# Patient Record
Sex: MALE | Race: WHITE | NOT HISPANIC OR LATINO | Employment: UNEMPLOYED | URBAN - METROPOLITAN AREA
[De-identification: names, ages, dates, MRNs, and addresses within clinical notes are randomized per-mention and may not be internally consistent; named-entity substitution may affect disease eponyms.]

---

## 2019-06-21 ENCOUNTER — HOSPITAL ENCOUNTER (EMERGENCY)
Facility: HOSPITAL | Age: 49
Discharge: HOME | End: 2019-06-21
Attending: EMERGENCY MEDICINE | Admitting: EMERGENCY MEDICINE

## 2019-06-21 ENCOUNTER — APPOINTMENT (OUTPATIENT)
Dept: CT IMAGING | Facility: HOSPITAL | Age: 49
End: 2019-06-21

## 2019-06-21 ENCOUNTER — APPOINTMENT (OUTPATIENT)
Dept: GENERAL RADIOLOGY | Facility: HOSPITAL | Age: 49
End: 2019-06-21

## 2019-06-21 VITALS
DIASTOLIC BLOOD PRESSURE: 74 MMHG | OXYGEN SATURATION: 100 % | TEMPERATURE: 98 F | BODY MASS INDEX: 33.6 KG/M2 | SYSTOLIC BLOOD PRESSURE: 112 MMHG | WEIGHT: 240 LBS | HEART RATE: 80 BPM | HEIGHT: 71 IN | RESPIRATION RATE: 16 BRPM

## 2019-06-21 DIAGNOSIS — G40.309 NONINTRACTABLE GENERALIZED IDIOPATHIC EPILEPSY WITHOUT STATUS EPILEPTICUS (HCC): Primary | ICD-10-CM

## 2019-06-21 DIAGNOSIS — F10.220: ICD-10-CM

## 2019-06-21 LAB
ANION GAP SERPL CALCULATED.3IONS-SCNC: 11 MMOL/L (ref 10–20)
BASOPHILS # BLD AUTO: 0 10*3/MM3 (ref 0–0.2)
BASOPHILS NFR BLD AUTO: 0.6 % (ref 0–1.5)
BUN BLD-MCNC: 12 MG/DL (ref 8–20)
BUN/CREAT SERPL: 15 (ref 6.2–20.3)
CALCIUM SPEC-SCNC: 8 MG/DL (ref 8.9–10.3)
CHLORIDE SERPL-SCNC: 100 MMOL/L (ref 101–111)
CO2 SERPL-SCNC: 22 MMOL/L (ref 22–32)
CREAT BLD-MCNC: 0.8 MG/DL (ref 0.7–1.2)
DEPRECATED RDW RBC AUTO: 41.1 FL (ref 37–54)
EOSINOPHIL # BLD AUTO: 0.2 10*3/MM3 (ref 0–0.4)
EOSINOPHIL NFR BLD AUTO: 3.5 % (ref 0.3–6.2)
ERYTHROCYTE [DISTWIDTH] IN BLOOD BY AUTOMATED COUNT: 13.1 % (ref 12.3–15.4)
ETHANOL UR QL: 0.14 %
GFR SERPL CREATININE-BSD FRML MDRD: 103 ML/MIN/1.73
GLUCOSE BLD-MCNC: 95 MG/DL (ref 65–99)
HCT VFR BLD AUTO: 39 % (ref 37.5–51)
HGB BLD-MCNC: 13.5 G/DL (ref 13–17.7)
LYMPHOCYTES # BLD AUTO: 1.4 10*3/MM3 (ref 0.7–3.1)
LYMPHOCYTES NFR BLD AUTO: 22.7 % (ref 19.6–45.3)
MCH RBC QN AUTO: 30.9 PG (ref 26.6–33)
MCHC RBC AUTO-ENTMCNC: 34.7 G/DL (ref 31.5–35.7)
MCV RBC AUTO: 89 FL (ref 79–97)
MONOCYTES # BLD AUTO: 0.5 10*3/MM3 (ref 0.1–0.9)
MONOCYTES NFR BLD AUTO: 7.8 % (ref 5–12)
NEUTROPHILS # BLD AUTO: 4 10*3/MM3 (ref 1.7–7)
NEUTROPHILS NFR BLD AUTO: 65.4 % (ref 42.7–76)
NRBC BLD AUTO-RTO: 0.1 /100 WBC (ref 0–0.2)
PLATELET # BLD AUTO: 192 10*3/MM3 (ref 140–450)
PMV BLD AUTO: 7.3 FL (ref 6–12)
POTASSIUM BLD-SCNC: 3 MMOL/L (ref 3.6–5.1)
RBC # BLD AUTO: 4.39 10*6/MM3 (ref 4.14–5.8)
SODIUM BLD-SCNC: 133 MMOL/L (ref 136–144)
VALPROATE SERPL-MCNC: 29.4 MCG/ML (ref 50–100)
WBC NRBC COR # BLD: 6.2 10*3/MM3 (ref 3.4–10.8)

## 2019-06-21 PROCEDURE — 80307 DRUG TEST PRSMV CHEM ANLYZR: CPT | Performed by: EMERGENCY MEDICINE

## 2019-06-21 PROCEDURE — 85025 COMPLETE CBC W/AUTO DIFF WBC: CPT | Performed by: EMERGENCY MEDICINE

## 2019-06-21 PROCEDURE — 93005 ELECTROCARDIOGRAM TRACING: CPT | Performed by: EMERGENCY MEDICINE

## 2019-06-21 PROCEDURE — 80164 ASSAY DIPROPYLACETIC ACD TOT: CPT | Performed by: EMERGENCY MEDICINE

## 2019-06-21 PROCEDURE — 70450 CT HEAD/BRAIN W/O DYE: CPT

## 2019-06-21 PROCEDURE — 71045 X-RAY EXAM CHEST 1 VIEW: CPT

## 2019-06-21 PROCEDURE — 99285 EMERGENCY DEPT VISIT HI MDM: CPT

## 2019-06-21 PROCEDURE — 80048 BASIC METABOLIC PNL TOTAL CA: CPT | Performed by: EMERGENCY MEDICINE

## 2019-06-21 PROCEDURE — 96374 THER/PROPH/DIAG INJ IV PUSH: CPT

## 2019-06-21 RX ORDER — VALPROATE SODIUM 100 MG/ML
2.5 INJECTION, SOLUTION INTRAVENOUS ONCE
Status: COMPLETED | OUTPATIENT
Start: 2019-06-21 | End: 2019-06-21

## 2019-06-21 RX ORDER — SODIUM CHLORIDE 0.9 % (FLUSH) 0.9 %
10 SYRINGE (ML) INJECTION AS NEEDED
Status: DISCONTINUED | OUTPATIENT
Start: 2019-06-21 | End: 2019-06-21 | Stop reason: HOSPADM

## 2019-06-21 RX ORDER — SERTRALINE HYDROCHLORIDE 25 MG/1
75 TABLET, FILM COATED ORAL DAILY
COMMUNITY
End: 2020-04-09

## 2019-06-21 RX ORDER — OXCARBAZEPINE 300 MG/1
900 TABLET, FILM COATED ORAL 2 TIMES DAILY
COMMUNITY
End: 2019-07-30 | Stop reason: HOSPADM

## 2019-06-21 RX ORDER — VALPROIC ACID 250 MG/1
750 CAPSULE, LIQUID FILLED ORAL 2 TIMES DAILY
COMMUNITY
End: 2019-07-30 | Stop reason: HOSPADM

## 2019-06-21 RX ORDER — BACLOFEN 10 MG/1
10 TABLET ORAL 3 TIMES DAILY
COMMUNITY
End: 2019-08-08 | Stop reason: HOSPADM

## 2019-06-21 RX ADMIN — VALPROATE SODIUM 273 MG: 100 INJECTION, SOLUTION INTRAVENOUS at 06:04

## 2019-06-22 ENCOUNTER — HOSPITAL ENCOUNTER (OUTPATIENT)
Facility: HOSPITAL | Age: 49
Setting detail: OBSERVATION
Discharge: LEFT BEFORE DISCHARGE | End: 2019-06-23
Attending: EMERGENCY MEDICINE | Admitting: HOSPITALIST

## 2019-06-22 ENCOUNTER — APPOINTMENT (OUTPATIENT)
Dept: CT IMAGING | Facility: HOSPITAL | Age: 49
End: 2019-06-22

## 2019-06-22 DIAGNOSIS — G40.901 STATUS EPILEPTICUS (HCC): Primary | ICD-10-CM

## 2019-06-22 LAB
ALBUMIN SERPL-MCNC: 4.2 G/DL (ref 3.5–5.2)
ALBUMIN/GLOB SERPL: 1.5 G/DL
ALP SERPL-CCNC: 90 U/L (ref 39–117)
ALT SERPL W P-5'-P-CCNC: 14 U/L (ref 1–41)
AMPHET+METHAMPHET UR QL: NEGATIVE
ANION GAP SERPL CALCULATED.3IONS-SCNC: 22.4 MMOL/L
AST SERPL-CCNC: 20 U/L (ref 1–40)
BARBITURATES UR QL SCN: NEGATIVE
BASOPHILS # BLD AUTO: 0.04 10*3/MM3 (ref 0–0.2)
BASOPHILS NFR BLD AUTO: 0.5 % (ref 0–1.5)
BENZODIAZ UR QL SCN: NEGATIVE
BILIRUB SERPL-MCNC: <0.2 MG/DL (ref 0.2–1.2)
BUN BLD-MCNC: 13 MG/DL (ref 6–20)
BUN/CREAT SERPL: 12.1 (ref 7–25)
CALCIUM SPEC-SCNC: 8.3 MG/DL (ref 8.6–10.5)
CANNABINOIDS SERPL QL: NEGATIVE
CHLORIDE SERPL-SCNC: 92 MMOL/L (ref 98–107)
CO2 SERPL-SCNC: 19.6 MMOL/L (ref 22–29)
COCAINE UR QL: NEGATIVE
CREAT BLD-MCNC: 1.07 MG/DL (ref 0.76–1.27)
DEPRECATED RDW RBC AUTO: 39.7 FL (ref 37–54)
EOSINOPHIL # BLD AUTO: 0.2 10*3/MM3 (ref 0–0.4)
EOSINOPHIL NFR BLD AUTO: 2.4 % (ref 0.3–6.2)
ERYTHROCYTE [DISTWIDTH] IN BLOOD BY AUTOMATED COUNT: 12.3 % (ref 12.3–15.4)
ETHANOL BLD-MCNC: 179 MG/DL (ref 0–10)
ETHANOL UR QL: 0.18 %
GFR SERPL CREATININE-BSD FRML MDRD: 73 ML/MIN/1.73
GLOBULIN UR ELPH-MCNC: 2.8 GM/DL
GLUCOSE BLD-MCNC: 85 MG/DL (ref 65–99)
HCT VFR BLD AUTO: 40.6 % (ref 37.5–51)
HGB BLD-MCNC: 13.9 G/DL (ref 13–17.7)
IMM GRANULOCYTES # BLD AUTO: 0.03 10*3/MM3 (ref 0–0.05)
IMM GRANULOCYTES NFR BLD AUTO: 0.4 % (ref 0–0.5)
LYMPHOCYTES # BLD AUTO: 1.94 10*3/MM3 (ref 0.7–3.1)
LYMPHOCYTES NFR BLD AUTO: 23.5 % (ref 19.6–45.3)
MCH RBC QN AUTO: 30.1 PG (ref 26.6–33)
MCHC RBC AUTO-ENTMCNC: 34.2 G/DL (ref 31.5–35.7)
MCV RBC AUTO: 87.9 FL (ref 79–97)
METHADONE UR QL SCN: NEGATIVE
MONOCYTES # BLD AUTO: 0.63 10*3/MM3 (ref 0.1–0.9)
MONOCYTES NFR BLD AUTO: 7.6 % (ref 5–12)
NEUTROPHILS # BLD AUTO: 5.4 10*3/MM3 (ref 1.7–7)
NEUTROPHILS NFR BLD AUTO: 65.6 % (ref 42.7–76)
NRBC BLD AUTO-RTO: 0 /100 WBC (ref 0–0.2)
OPIATES UR QL: NEGATIVE
OXYCODONE UR QL SCN: NEGATIVE
PLATELET # BLD AUTO: 211 10*3/MM3 (ref 140–450)
PMV BLD AUTO: 9.6 FL (ref 6–12)
POTASSIUM BLD-SCNC: 3.7 MMOL/L (ref 3.5–5.2)
PROT SERPL-MCNC: 7 G/DL (ref 6–8.5)
RBC # BLD AUTO: 4.62 10*6/MM3 (ref 4.14–5.8)
SODIUM BLD-SCNC: 134 MMOL/L (ref 136–145)
VALPROATE SERPL-MCNC: 29 MCG/ML (ref 50–125)
WBC NRBC COR # BLD: 8.24 10*3/MM3 (ref 3.4–10.8)

## 2019-06-22 PROCEDURE — 80053 COMPREHEN METABOLIC PANEL: CPT | Performed by: EMERGENCY MEDICINE

## 2019-06-22 PROCEDURE — 85025 COMPLETE CBC W/AUTO DIFF WBC: CPT | Performed by: EMERGENCY MEDICINE

## 2019-06-22 PROCEDURE — 80307 DRUG TEST PRSMV CHEM ANLYZR: CPT | Performed by: EMERGENCY MEDICINE

## 2019-06-22 PROCEDURE — G0378 HOSPITAL OBSERVATION PER HR: HCPCS

## 2019-06-22 PROCEDURE — 99285 EMERGENCY DEPT VISIT HI MDM: CPT

## 2019-06-22 PROCEDURE — 72125 CT NECK SPINE W/O DYE: CPT

## 2019-06-22 PROCEDURE — 96365 THER/PROPH/DIAG IV INF INIT: CPT

## 2019-06-22 PROCEDURE — 25010000003 LEVETIRACETAM IN NACL 0.75% 1000 MG/100ML SOLUTION: Performed by: EMERGENCY MEDICINE

## 2019-06-22 PROCEDURE — 80164 ASSAY DIPROPYLACETIC ACD TOT: CPT | Performed by: EMERGENCY MEDICINE

## 2019-06-22 PROCEDURE — 70450 CT HEAD/BRAIN W/O DYE: CPT

## 2019-06-22 RX ORDER — SERTRALINE HYDROCHLORIDE 25 MG/1
25 TABLET, FILM COATED ORAL NIGHTLY
Status: DISCONTINUED | OUTPATIENT
Start: 2019-06-22 | End: 2019-06-22

## 2019-06-22 RX ORDER — SODIUM CHLORIDE 0.9 % (FLUSH) 0.9 %
3-10 SYRINGE (ML) INJECTION AS NEEDED
Status: DISCONTINUED | OUTPATIENT
Start: 2019-06-22 | End: 2019-06-23 | Stop reason: HOSPADM

## 2019-06-22 RX ORDER — BACLOFEN 10 MG/1
10 TABLET ORAL 3 TIMES DAILY
Status: DISCONTINUED | OUTPATIENT
Start: 2019-06-23 | End: 2019-06-23 | Stop reason: HOSPADM

## 2019-06-22 RX ORDER — LORAZEPAM 2 MG/ML
2 INJECTION INTRAMUSCULAR
Status: DISCONTINUED | OUTPATIENT
Start: 2019-06-22 | End: 2019-06-23 | Stop reason: HOSPADM

## 2019-06-22 RX ORDER — OXCARBAZEPINE 300 MG/1
300 TABLET, FILM COATED ORAL EVERY 12 HOURS SCHEDULED
Status: DISCONTINUED | OUTPATIENT
Start: 2019-06-23 | End: 2019-06-23

## 2019-06-22 RX ORDER — SERTRALINE HYDROCHLORIDE 25 MG/1
25 TABLET, FILM COATED ORAL NIGHTLY
Status: DISCONTINUED | OUTPATIENT
Start: 2019-06-23 | End: 2019-06-23 | Stop reason: HOSPADM

## 2019-06-22 RX ORDER — ACETAMINOPHEN 325 MG/1
650 TABLET ORAL EVERY 4 HOURS PRN
Status: DISCONTINUED | OUTPATIENT
Start: 2019-06-22 | End: 2019-06-23 | Stop reason: HOSPADM

## 2019-06-22 RX ORDER — LEVETIRACETAM 10 MG/ML
1000 INJECTION INTRAVASCULAR EVERY 12 HOURS SCHEDULED
Status: DISCONTINUED | OUTPATIENT
Start: 2019-06-23 | End: 2019-06-23 | Stop reason: HOSPADM

## 2019-06-22 RX ORDER — LORAZEPAM 1 MG/1
1 TABLET ORAL EVERY 8 HOURS
Status: DISCONTINUED | OUTPATIENT
Start: 2019-06-23 | End: 2019-06-23 | Stop reason: HOSPADM

## 2019-06-22 RX ORDER — THIAMINE MONONITRATE (VIT B1) 100 MG
100 TABLET ORAL ONCE
Status: COMPLETED | OUTPATIENT
Start: 2019-06-23 | End: 2019-06-23

## 2019-06-22 RX ORDER — NITROGLYCERIN 0.4 MG/1
0.4 TABLET SUBLINGUAL
Status: DISCONTINUED | OUTPATIENT
Start: 2019-06-22 | End: 2019-06-23 | Stop reason: HOSPADM

## 2019-06-22 RX ORDER — LORAZEPAM 1 MG/1
1 TABLET ORAL EVERY 6 HOURS
Status: COMPLETED | OUTPATIENT
Start: 2019-06-22 | End: 2019-06-23

## 2019-06-22 RX ORDER — VALPROIC ACID 250 MG/1
750 CAPSULE, LIQUID FILLED ORAL 2 TIMES DAILY
Status: DISCONTINUED | OUTPATIENT
Start: 2019-06-23 | End: 2019-06-23

## 2019-06-22 RX ORDER — LORAZEPAM 1 MG/1
1 TABLET ORAL
Status: DISCONTINUED | OUTPATIENT
Start: 2019-06-22 | End: 2019-06-23 | Stop reason: HOSPADM

## 2019-06-22 RX ORDER — SODIUM CHLORIDE 0.9 % (FLUSH) 0.9 %
3 SYRINGE (ML) INJECTION EVERY 12 HOURS SCHEDULED
Status: DISCONTINUED | OUTPATIENT
Start: 2019-06-22 | End: 2019-06-23 | Stop reason: HOSPADM

## 2019-06-22 RX ORDER — ONDANSETRON 2 MG/ML
4 INJECTION INTRAMUSCULAR; INTRAVENOUS EVERY 6 HOURS PRN
Status: DISCONTINUED | OUTPATIENT
Start: 2019-06-22 | End: 2019-06-23 | Stop reason: HOSPADM

## 2019-06-22 RX ORDER — LORAZEPAM 2 MG/ML
1 INJECTION INTRAMUSCULAR
Status: DISCONTINUED | OUTPATIENT
Start: 2019-06-22 | End: 2019-06-23 | Stop reason: HOSPADM

## 2019-06-22 RX ORDER — LORAZEPAM 1 MG/1
2 TABLET ORAL
Status: DISCONTINUED | OUTPATIENT
Start: 2019-06-22 | End: 2019-06-23 | Stop reason: HOSPADM

## 2019-06-22 RX ORDER — SODIUM CHLORIDE AND POTASSIUM CHLORIDE 150; 900 MG/100ML; MG/100ML
75 INJECTION, SOLUTION INTRAVENOUS CONTINUOUS
Status: DISCONTINUED | OUTPATIENT
Start: 2019-06-23 | End: 2019-06-23 | Stop reason: HOSPADM

## 2019-06-22 RX ORDER — LEVETIRACETAM 10 MG/ML
1000 INJECTION INTRAVASCULAR ONCE
Status: COMPLETED | OUTPATIENT
Start: 2019-06-22 | End: 2019-06-22

## 2019-06-22 RX ADMIN — LEVETIRACETAM 1000 MG: 10 INJECTION INTRAVENOUS at 21:05

## 2019-06-22 NOTE — ED TRIAGE NOTES
EMS reports 5 witnessed seizures at home with family with @ 10seconds of post ictal state between seizures. EMS witnessed 4 seizures enroute to ER with @5 sec of post ictal state between seizures. EMS gave 10mg Versed IM. Family also reports alcohol use today.

## 2019-06-22 NOTE — ED PROVIDER NOTES
EMERGENCY DEPARTMENT ENCOUNTER    Room Number:  16/16  PCP: Bert Donis DO  Historian: EMS and family  History Limited By: seizures      HPI  Chief Complaint: seizures  Context: Valdemar Vo is a 49 y.o. male who presents to the ED c/o 6 seizures today. Pt was fishing w/ his family and had some beers, when pt began to have seizure like activity and hit his head on the dock. Also c/o HA. Denies abd pain. Family reports 6 seizures today, 4 witnessed seizures by EMS. While en route, EMS administered Versed. Seen at [facility] yesterday for seizures, and given IV Depakote after finding pt to be in subtherapeutic Valproic Acid level, but family reports pt is non compliant w/ seizure medications. Also takes Trileptal.    Location: neuro  Radiation: none  Character: 6 episodes  Duration: today  Severity: moderate  Progression: worsening  Aggravating Factors: n/a  Alleviating Factors: n/a      MEDICAL RECORD REVIEW      Seen at [facility] yesterday for seizures, and given IV Depakote after finding pt to be in subtherapeutic Valproic Acid level (29.4), Pt was noted to have been drinking ETOH at the time. Diagnosed w/ non intractable generalized idiopathic epilepsy w/o status epilepticus, as well as acute alcoholic intoxication in alcoholism w/ blood level of 0.139 w/o complication.        PAST MEDICAL HISTORY  Active Ambulatory Problems     Diagnosis Date Noted   • No Active Ambulatory Problems     Resolved Ambulatory Problems     Diagnosis Date Noted   • No Resolved Ambulatory Problems     Past Medical History:   Diagnosis Date   • Depression    • Hypertension    • Migraine    • Pneumonia    • Seizures (CMS/HCC)          PAST SURGICAL HISTORY  Past Surgical History:   Procedure Laterality Date   • HERNIA REPAIR           FAMILY HISTORY  History reviewed. No pertinent family history.      SOCIAL HISTORY  Social History     Socioeconomic History   • Marital status: Single     Spouse name: Not on file   •  Number of children: Not on file   • Years of education: Not on file   • Highest education level: Not on file         ALLERGIES  Codeine        REVIEW OF SYSTEMS  Review of Systems   Reason unable to perform ROS: seizures.   Neurological: Positive for seizures and headaches.            PHYSICAL EXAM  ED Triage Vitals   Temp Pulse Resp BP SpO2   -- -- -- -- --      Temp src Heart Rate Source Patient Position BP Location FiO2 (%)   -- -- -- -- --       Physical Exam   Constitutional: He is oriented to person, place, and time. No distress.   HENT:   Head: Normocephalic and atraumatic.   C Collar in place. No bite marks seen on tongue.   Eyes: EOM are normal. Pupils are equal, round, and reactive to light.   Neck: Normal range of motion. Neck supple.   Cardiovascular: Regular rhythm and normal heart sounds. Tachycardia present.   Pulmonary/Chest: Effort normal and breath sounds normal. No respiratory distress.   Abdominal: Soft. There is no tenderness. There is no rebound and no guarding.   Musculoskeletal: Normal range of motion. He exhibits no edema.   Neurological: He is alert and oriented to person, place, and time. He has normal sensation and normal strength.   Opens eyes to voice   Skin: Skin is warm and dry.   Abrasions to forehead   Psychiatric: Mood and affect normal.   Nursing note and vitals reviewed.          LAB RESULTS  Recent Results (from the past 24 hour(s))   Comprehensive Metabolic Panel    Collection Time: 06/22/19  6:09 PM   Result Value Ref Range    Glucose 85 65 - 99 mg/dL    BUN 13 6 - 20 mg/dL    Creatinine 1.07 0.76 - 1.27 mg/dL    Sodium 134 (L) 136 - 145 mmol/L    Potassium 3.7 3.5 - 5.2 mmol/L    Chloride 92 (L) 98 - 107 mmol/L    CO2 19.6 (L) 22.0 - 29.0 mmol/L    Calcium 8.3 (L) 8.6 - 10.5 mg/dL    Total Protein 7.0 6.0 - 8.5 g/dL    Albumin 4.20 3.50 - 5.20 g/dL    ALT (SGPT) 14 1 - 41 U/L    AST (SGOT) 20 1 - 40 U/L    Alkaline Phosphatase 90 39 - 117 U/L    Total Bilirubin <0.2 (L) 0.2 -  1.2 mg/dL    eGFR Non African Amer 73 >60 mL/min/1.73    Globulin 2.8 gm/dL    A/G Ratio 1.5 g/dL    BUN/Creatinine Ratio 12.1 7.0 - 25.0    Anion Gap 22.4 mmol/L   Valproic Acid Level, Total    Collection Time: 06/22/19  6:09 PM   Result Value Ref Range    Valproic Acid 29.0 (L) 50.0 - 125.0 mcg/mL   Ethanol    Collection Time: 06/22/19  6:09 PM   Result Value Ref Range    Ethanol 179 (H) 0 - 10 mg/dL    Ethanol % 0.179 %   CBC Auto Differential    Collection Time: 06/22/19  6:09 PM   Result Value Ref Range    WBC 8.24 3.40 - 10.80 10*3/mm3    RBC 4.62 4.14 - 5.80 10*6/mm3    Hemoglobin 13.9 13.0 - 17.7 g/dL    Hematocrit 40.6 37.5 - 51.0 %    MCV 87.9 79.0 - 97.0 fL    MCH 30.1 26.6 - 33.0 pg    MCHC 34.2 31.5 - 35.7 g/dL    RDW 12.3 12.3 - 15.4 %    RDW-SD 39.7 37.0 - 54.0 fl    MPV 9.6 6.0 - 12.0 fL    Platelets 211 140 - 450 10*3/mm3    Neutrophil % 65.6 42.7 - 76.0 %    Lymphocyte % 23.5 19.6 - 45.3 %    Monocyte % 7.6 5.0 - 12.0 %    Eosinophil % 2.4 0.3 - 6.2 %    Basophil % 0.5 0.0 - 1.5 %    Immature Grans % 0.4 0.0 - 0.5 %    Neutrophils, Absolute 5.40 1.70 - 7.00 10*3/mm3    Lymphocytes, Absolute 1.94 0.70 - 3.10 10*3/mm3    Monocytes, Absolute 0.63 0.10 - 0.90 10*3/mm3    Eosinophils, Absolute 0.20 0.00 - 0.40 10*3/mm3    Basophils, Absolute 0.04 0.00 - 0.20 10*3/mm3    Immature Grans, Absolute 0.03 0.00 - 0.05 10*3/mm3    nRBC 0.0 0.0 - 0.2 /100 WBC   Urine Drug Screen - Urine, Clean Catch    Collection Time: 06/22/19  6:11 PM   Result Value Ref Range    Amphet/Methamphet, Screen Negative Negative    Barbiturates Screen, Urine Negative Negative    Benzodiazepine Screen, Urine Negative Negative    Cocaine Screen, Urine Negative Negative    Opiate Screen Negative Negative    THC, Screen, Urine Negative Negative    Methadone Screen, Urine Negative Negative    Oxycodone Screen, Urine Negative Negative       Ordered the above labs and reviewed the results.        RADIOLOGY  CT Head Without Contrast   Final  Result   Impression:   1.  No findings of acute intracranial abnormality.  No cervical spine   fracture or malalignment.       This report was finalized on 6/22/2019 8:20 PM by Dr. Mathew Camarillo M.D.          CT Cervical Spine Without Contrast   Final Result   Impression:   1.  No findings of acute intracranial abnormality.  No cervical spine   fracture or malalignment.       This report was finalized on 6/22/2019 8:20 PM by Dr. Mathew Camarillo M.D.               Ordered the above noted radiological studies. Reviewed by me in PACS.            PROCEDURES  Procedures      MEDICATIONS GIVEN IN ER  Medications   levETIRAcetam in NaCl 0.75% (KEPPRA) IVPB 1,000 mg (1,000 mg Intravenous New Bag 6/22/19 2105)             PROGRESS AND CONSULTS  ED Course as of Jun 22 2123   Sat Jun 22, 2019 2118 9:18 PM  Patient seen at [facility] for seizures yesterday and given depakote.  Had several seizures today including several observed by EMS.  Given IM versed and eventually stopped.  Has april drinking today.  Discussed with Dr. Monae who wants Keppra.  Discussed with Dr. Duque who will admit.  [SL]      ED Course User Index  [SL] Bonilla Maier MD     1808- Ordered CT C Spine, CT Head, CMP, Valproic Acid, Ethanol level, UDS, and CBC for further evaluation.     2032- Rechecked pt. Pt is resting comfortably. Asked pt if he remembered why he was in [facility] [place], operated by [facility] yesterday. Pt states his seizures were controlled on Keppra. Admits to drinking today. Notified pt of PAMELA of 0.179. Discussed the plan to consult neurology for possible admission. Pt understands and agrees with the plan, all questions answered. Placed call out to Neurology for consult.    2057- Talked to Dr. Monae, neurology, who agrees w/ plan of care and agrees to consult pt after admission. Placed call out to A for admission. He would like us to give pt 1 gram of Keppra. Ordered dose of Keppra.    2114- Talked to Dr. Duque, A, who agrees w/ plan of care and agrees  to admit pt for further evaluation.      MEDICAL DECISION MAKING      MDM  Number of Diagnoses or Management Options     Amount and/or Complexity of Data Reviewed  Clinical lab tests: ordered and reviewed (Ethanol 0.179)  Tests in the radiology section of CPT®: ordered and reviewed (CT Head negative)  Decide to obtain previous medical records or to obtain history from someone other than the patient: yes  Obtain history from someone other than the patient: yes (EMS)  Review and summarize past medical records: yes (See Medical Review Section)  Discuss the patient with other providers: yes (Dr. Duque, [facility])               DIAGNOSIS  Final diagnoses:   Status epilepticus (CMS/HCC)           DISPOSITION  ADMISSION    Discussed treatment plan and reason for admission with pt/family and admitting physician.  Pt/family voiced understanding of the plan for admission for further testing/treatment as needed.             Latest Documented Vital Signs:  As of 9:23 PM  BP- 101/71 HR- 100 Temp- 98.9 °F (37.2 °C) O2 sat- 96%        --  Documentation assistance provided by tejinder Ahn for Dr. Darrion MD.  Information recorded by the scribe was done at my direction and has been verified and validated by me.     Yamilet Ahn  06/22/19 2123       Bonilla Maier MD  06/22/19 2222

## 2019-06-23 ENCOUNTER — APPOINTMENT (OUTPATIENT)
Dept: GENERAL RADIOLOGY | Facility: HOSPITAL | Age: 49
End: 2019-06-23

## 2019-06-23 VITALS
RESPIRATION RATE: 18 BRPM | HEART RATE: 82 BPM | DIASTOLIC BLOOD PRESSURE: 79 MMHG | SYSTOLIC BLOOD PRESSURE: 128 MMHG | HEIGHT: 65 IN | OXYGEN SATURATION: 96 % | WEIGHT: 227.07 LBS | TEMPERATURE: 97.9 F | BODY MASS INDEX: 37.83 KG/M2

## 2019-06-23 LAB
ALBUMIN SERPL-MCNC: 4.2 G/DL (ref 3.5–5.2)
ALBUMIN/GLOB SERPL: 1.5 G/DL
ALP SERPL-CCNC: 75 U/L (ref 39–117)
ALT SERPL W P-5'-P-CCNC: 11 U/L (ref 1–41)
ANION GAP SERPL CALCULATED.3IONS-SCNC: 13 MMOL/L
AST SERPL-CCNC: 18 U/L (ref 1–40)
BASOPHILS # BLD AUTO: 0.04 10*3/MM3 (ref 0–0.2)
BASOPHILS NFR BLD AUTO: 0.7 % (ref 0–1.5)
BILIRUB SERPL-MCNC: 0.4 MG/DL (ref 0.2–1.2)
BUN BLD-MCNC: 14 MG/DL (ref 6–20)
BUN/CREAT SERPL: 18.7 (ref 7–25)
CALCIUM SPEC-SCNC: 8.8 MG/DL (ref 8.6–10.5)
CHLORIDE SERPL-SCNC: 101 MMOL/L (ref 98–107)
CK SERPL-CCNC: 178 U/L (ref 20–200)
CO2 SERPL-SCNC: 24 MMOL/L (ref 22–29)
CREAT BLD-MCNC: 0.75 MG/DL (ref 0.76–1.27)
DEPRECATED RDW RBC AUTO: 40.7 FL (ref 37–54)
EOSINOPHIL # BLD AUTO: 0.22 10*3/MM3 (ref 0–0.4)
EOSINOPHIL NFR BLD AUTO: 3.7 % (ref 0.3–6.2)
ERYTHROCYTE [DISTWIDTH] IN BLOOD BY AUTOMATED COUNT: 12.4 % (ref 12.3–15.4)
GFR SERPL CREATININE-BSD FRML MDRD: 111 ML/MIN/1.73
GLOBULIN UR ELPH-MCNC: 2.8 GM/DL
GLUCOSE BLD-MCNC: 90 MG/DL (ref 65–99)
HCT VFR BLD AUTO: 43.3 % (ref 37.5–51)
HGB BLD-MCNC: 14.7 G/DL (ref 13–17.7)
IMM GRANULOCYTES # BLD AUTO: 0.02 10*3/MM3 (ref 0–0.05)
IMM GRANULOCYTES NFR BLD AUTO: 0.3 % (ref 0–0.5)
LYMPHOCYTES # BLD AUTO: 1.34 10*3/MM3 (ref 0.7–3.1)
LYMPHOCYTES NFR BLD AUTO: 22.6 % (ref 19.6–45.3)
MAGNESIUM SERPL-MCNC: 2.1 MG/DL (ref 1.6–2.6)
MCH RBC QN AUTO: 30.1 PG (ref 26.6–33)
MCHC RBC AUTO-ENTMCNC: 33.9 G/DL (ref 31.5–35.7)
MCV RBC AUTO: 88.5 FL (ref 79–97)
MONOCYTES # BLD AUTO: 0.49 10*3/MM3 (ref 0.1–0.9)
MONOCYTES NFR BLD AUTO: 8.3 % (ref 5–12)
NEUTROPHILS # BLD AUTO: 3.81 10*3/MM3 (ref 1.7–7)
NEUTROPHILS NFR BLD AUTO: 64.4 % (ref 42.7–76)
NRBC BLD AUTO-RTO: 0 /100 WBC (ref 0–0.2)
PLATELET # BLD AUTO: 194 10*3/MM3 (ref 140–450)
PMV BLD AUTO: 9.5 FL (ref 6–12)
POTASSIUM BLD-SCNC: 4.2 MMOL/L (ref 3.5–5.2)
PROT SERPL-MCNC: 7 G/DL (ref 6–8.5)
RBC # BLD AUTO: 4.89 10*6/MM3 (ref 4.14–5.8)
SODIUM BLD-SCNC: 138 MMOL/L (ref 136–145)
TROPONIN T SERPL-MCNC: <0.01 NG/ML (ref 0–0.03)
WBC NRBC COR # BLD: 5.92 10*3/MM3 (ref 3.4–10.8)

## 2019-06-23 PROCEDURE — 25010000002 THIAMINE PER 100 MG: Performed by: INTERNAL MEDICINE

## 2019-06-23 PROCEDURE — 85025 COMPLETE CBC W/AUTO DIFF WBC: CPT | Performed by: INTERNAL MEDICINE

## 2019-06-23 PROCEDURE — 84484 ASSAY OF TROPONIN QUANT: CPT | Performed by: HOSPITALIST

## 2019-06-23 PROCEDURE — 73030 X-RAY EXAM OF SHOULDER: CPT

## 2019-06-23 PROCEDURE — 99244 OFF/OP CNSLTJ NEW/EST MOD 40: CPT | Performed by: PSYCHIATRY & NEUROLOGY

## 2019-06-23 PROCEDURE — 25010000003 LEVETIRACETAM IN NACL 0.75% 1000 MG/100ML SOLUTION: Performed by: INTERNAL MEDICINE

## 2019-06-23 PROCEDURE — 25810000003 SODIUM CHLORIDE 0.9 % WITH KCL 20 MEQ 20-0.9 MEQ/L-% SOLUTION: Performed by: HOSPITALIST

## 2019-06-23 PROCEDURE — G0378 HOSPITAL OBSERVATION PER HR: HCPCS

## 2019-06-23 PROCEDURE — 83735 ASSAY OF MAGNESIUM: CPT | Performed by: INTERNAL MEDICINE

## 2019-06-23 PROCEDURE — 96361 HYDRATE IV INFUSION ADD-ON: CPT

## 2019-06-23 PROCEDURE — 25810000003 SODIUM CHLORIDE 0.9 % WITH KCL 20 MEQ 20-0.9 MEQ/L-% SOLUTION: Performed by: INTERNAL MEDICINE

## 2019-06-23 PROCEDURE — 80053 COMPREHEN METABOLIC PANEL: CPT | Performed by: INTERNAL MEDICINE

## 2019-06-23 PROCEDURE — 82550 ASSAY OF CK (CPK): CPT | Performed by: INTERNAL MEDICINE

## 2019-06-23 RX ORDER — HYDROCODONE BITARTRATE AND ACETAMINOPHEN 5; 325 MG/1; MG/1
1 TABLET ORAL EVERY 6 HOURS PRN
Status: DISCONTINUED | OUTPATIENT
Start: 2019-06-23 | End: 2019-06-23 | Stop reason: HOSPADM

## 2019-06-23 RX ORDER — THIAMINE MONONITRATE (VIT B1) 100 MG
200 TABLET ORAL DAILY
Status: DISCONTINUED | OUTPATIENT
Start: 2019-06-23 | End: 2019-06-23 | Stop reason: HOSPADM

## 2019-06-23 RX ADMIN — POTASSIUM CHLORIDE AND SODIUM CHLORIDE 100 ML/HR: 900; 150 INJECTION, SOLUTION INTRAVENOUS at 00:45

## 2019-06-23 RX ADMIN — LORAZEPAM 1 MG: 1 TABLET ORAL at 00:32

## 2019-06-23 RX ADMIN — ACETAMINOPHEN 650 MG: 325 TABLET, FILM COATED ORAL at 14:25

## 2019-06-23 RX ADMIN — LORAZEPAM 1 MG: 1 TABLET ORAL at 11:59

## 2019-06-23 RX ADMIN — OXCARBAZEPINE 300 MG: 300 TABLET, FILM COATED ORAL at 00:47

## 2019-06-23 RX ADMIN — LEVETIRACETAM 1000 MG: 10 INJECTION INTRAVENOUS at 11:59

## 2019-06-23 RX ADMIN — BACLOFEN 10 MG: 10 TABLET ORAL at 09:02

## 2019-06-23 RX ADMIN — THIAMINE HYDROCHLORIDE 100 MG: 100 INJECTION, SOLUTION INTRAMUSCULAR; INTRAVENOUS at 00:46

## 2019-06-23 RX ADMIN — Medication 200 MG: at 11:59

## 2019-06-23 RX ADMIN — BACLOFEN 10 MG: 10 TABLET ORAL at 16:53

## 2019-06-23 RX ADMIN — POTASSIUM CHLORIDE AND SODIUM CHLORIDE 75 ML/HR: 900; 150 INJECTION, SOLUTION INTRAVENOUS at 13:15

## 2019-06-23 RX ADMIN — OXCARBAZEPINE 300 MG: 300 TABLET, FILM COATED ORAL at 09:02

## 2019-06-23 RX ADMIN — SODIUM CHLORIDE, PRESERVATIVE FREE 3 ML: 5 INJECTION INTRAVENOUS at 00:32

## 2019-06-23 RX ADMIN — VALPROIC ACID 750 MG: 250 CAPSULE, LIQUID FILLED ORAL at 09:02

## 2019-06-23 RX ADMIN — SERTRALINE 25 MG: 25 TABLET, FILM COATED ORAL at 00:46

## 2019-06-23 RX ADMIN — SODIUM CHLORIDE, PRESERVATIVE FREE 3 ML: 5 INJECTION INTRAVENOUS at 09:03

## 2019-06-23 RX ADMIN — LORAZEPAM 1 MG: 1 TABLET ORAL at 16:53

## 2019-06-23 RX ADMIN — BACLOFEN 10 MG: 10 TABLET ORAL at 00:46

## 2019-06-23 RX ADMIN — VALPROIC ACID 750 MG: 250 CAPSULE, LIQUID FILLED ORAL at 00:47

## 2019-06-23 RX ADMIN — LORAZEPAM 1 MG: 1 TABLET ORAL at 06:52

## 2019-06-23 NOTE — H&P
Patient Care Team:  Bert Donis DO as PCP - General (Family Medicine)    Chief complaint seizures    Subjective     History of Present Illness    This is a 49-year-old woman exam history of seizure disorders and his seizures have not been controlled over the last month.  He is currently on Depakote and Trileptal.  The patient was fishing with some friends and had 2 seizures while with his friends fishing and EMS was called and brought to the hospital and had 4 seizures in route.  Patient was seen in the emergency room and is admitted to the floor.  When I am seeing the patient he has slight tremor.  Patient is quite anxious about being discharged from the hospital and asked me if it is possible for him to be discharged today.  Patient is most concerned about his service dog.    Since being on the floor the patient is now saying that he wishes to be discharged AMA.  We have asked that the patient be monitored overnight so his medications can be adjusted by our neurology service.    The patient does not complain to me but did complain to the nurse and when asked about his left shoulder he does not admit to left shoulder pain.    Review of Systems   Constitutional: Negative for chills and fever.   HENT: Negative for congestion and rhinorrhea.    Eyes: Negative for pain and redness.   Respiratory: Negative for cough and chest tightness.    Cardiovascular: Negative for chest pain.   Gastrointestinal: Negative for abdominal distention, abdominal pain, nausea and vomiting.   Endocrine: Negative for cold intolerance and heat intolerance.   Genitourinary: Negative for difficulty urinating and dysuria.   Musculoskeletal: Positive for arthralgias ( left shoulder pain). Negative for back pain.   Skin: Negative for color change and rash.   Neurological: Positive for seizures. Negative for light-headedness and headaches.   Psychiatric/Behavioral: Negative for agitation and behavioral problems.        Past Medical  History:   Diagnosis Date   • Depression    • Hypertension    • Migraine    • Pneumonia    • Seizures (CMS/HCC)      Past Surgical History:   Procedure Laterality Date   • HERNIA REPAIR       History reviewed. No pertinent family history.  Social History     Tobacco Use   • Smoking status: Never Smoker   • Smokeless tobacco: Never Used   Substance Use Topics   • Alcohol use: Yes     Alcohol/week: 3.6 oz     Types: 6 Cans of beer per week     Comment: on weekends   • Drug use: No     Medications Prior to Admission   Medication Sig Dispense Refill Last Dose   • baclofen (LIORESAL) 10 MG tablet Take 10 mg by mouth 3 (Three) Times a Day.      • OXcarbazepine (TRILEPTAL) 300 MG tablet Take 300 mg by mouth 2 (Two) Times a Day.      • sertraline (ZOLOFT) 25 MG tablet Take 25 mg by mouth 3 (Three) Times a Day.      • valproic acid (DEPAKENE) 250 MG capsule Take 750 mg by mouth 2 (Two) Times a Day.        Allergies:  Codeine    Objective      Vital Signs  Temp:  [98.6 °F (37 °C)-98.9 °F (37.2 °C)] 98.6 °F (37 °C)  Heart Rate:  [] 88  Resp:  [14-18] 18  BP: (101-135)/(69-82) 120/77    Physical Exam   Constitutional: He appears well-developed and well-nourished.   HENT:   Head: Normocephalic and atraumatic.   Eyes: Pupils are equal, round, and reactive to light. No scleral icterus.   Neck: Normal range of motion. Neck supple.   Cardiovascular: Normal rate and regular rhythm.   Pulmonary/Chest: Effort normal and breath sounds normal. He has no wheezes.   Abdominal: Soft. Bowel sounds are normal.   Musculoskeletal: Normal range of motion. He exhibits edema.   Neurological: He is alert.   Patient has a tremor   Skin: Skin is warm and dry.   Psychiatric: He has a normal mood and affect. His behavior is normal.       Results Review:   I reviewed the patient's new clinical results.  I reviewed the patient's new imaging results and agree with the interpretation.      Assessment/Plan       Status epilepticus (CMS/HCC)  Left  shoulder pain    Assessment & Plan    The patient was admitted to the hospital and has been given his Depakote and Trileptal and has been started on Keppra.    Patient has been seen by Dr. Monae.  Dr. 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    Dr. Monae recommends the patient have an EEG tomorrow further recommendation       I discussed the patients findings and my recommendations with patient and nursing staff    Marin Lundberg MD  06/23/19  9:51 AM    Time:

## 2019-06-23 NOTE — PLAN OF CARE
Problem: Patient Care Overview  Goal: Plan of Care Review  Outcome: Ongoing (interventions implemented as appropriate)   06/23/19 0609   Coping/Psychosocial   Plan of Care Reviewed With patient   OTHER   Outcome Summary Patient slept all evening. Only waking to stimulation. Kelly very involved in patient care. She gave me all of his admission data base as patient was not awake. She states that he was hospitalized just one day ago for seizures as well. Patient usually goes to [facility] but new neurologist suggested [facility]. Patient seizure free throughout the night.        Problem: Fall Risk (Adult)  Goal: Absence of Fall  Outcome: Ongoing (interventions implemented as appropriate)

## 2019-06-23 NOTE — NURSING NOTE
Patient signed AMA paperwork. He is Alert and oriented x4. Was explained risks versus benefits of hospitalization. IV removed.  Friend driving patient home to see dog. Dr. Lundberg notified and  notified.

## 2019-06-23 NOTE — CONSULTS
Patient Identification:  NAME:  Valdemar Vo  Age:  49 y.o.   Sex:  male   :  1970   MRN:  [id number]       Chief complaint: He does not have one reason for consult seizure    History of present illness: This patient is a 49-year-old right-handed white male with history of drug abuse migraine seizures and hypertension he comes in after having recurrent seizure activity and then hit his head on the dock.  In route he was treated with Versed as a modifying factors.  He has been on Depakote but he is noncompliant with seizure medicines he is supposedly been on Trileptal as well location was a generalized seizure by report with 6 of them on route by EMS according to family context patient did test positive for methamphetamine quality tonic-clonic modifying factors Versed and Keppra      Past medical history:  Past Medical History:   Diagnosis Date   • Depression    • Hypertension    • Migraine    • Pneumonia    • Seizures (CMS/HCC)        Past surgical history:  Past Surgical History:   Procedure Laterality Date   • HERNIA REPAIR         Allergies:  Codeine    Home medications:  Medications Prior to Admission   Medication Sig Dispense Refill Last Dose   • baclofen (LIORESAL) 10 MG tablet Take 10 mg by mouth 3 (Three) Times a Day.      • OXcarbazepine (TRILEPTAL) 300 MG tablet Take 300 mg by mouth 2 (Two) Times a Day.      • sertraline (ZOLOFT) 25 MG tablet Take 25 mg by mouth 3 (Three) Times a Day.      • valproic acid (DEPAKENE) 250 MG capsule Take 750 mg by mouth 2 (Two) Times a Day.           Hospital medications:    baclofen 10 mg Oral TID   levETIRAcetam 1,000 mg Intravenous Q12H   LORazepam 1 mg Oral Q6H   Followed by      LORazepam 1 mg Oral Q8H   OXcarbazepine 300 mg Oral Q12H   sertraline 25 mg Oral Nightly   sodium chloride 3 mL Intravenous Q12H   thiamine 200 mg Oral Daily   valproic acid 750 mg Oral BID       sodium chloride 0.9 % with KCl 20 mEq 75 mL/hr Last Rate: 75 mL/hr (19 1315)      •  acetaminophen  •  LORazepam **OR** LORazepam **OR** LORazepam **OR** LORazepam **OR** LORazepam **OR** LORazepam  •  nitroglycerin  •  ondansetron  •  sodium chloride    Family history:  History reviewed. No pertinent family history.    Social history:  Social History     Tobacco Use   • Smoking status: Never Smoker   • Smokeless tobacco: Never Used   Substance Use Topics   • Alcohol use: Yes     Alcohol/week: 3.6 oz     Types: 6 Cans of beer per week     Comment: on weekends   • Drug use: No       Review of systems:    Rambling speech not making a lot of sense off and on.  No family present I reviewed the chart Blanca    Objective:  Vitals Ranges:   Temp:  [97.9 °F (36.6 °C)-98.9 °F (37.2 °C)] 97.9 °F (36.6 °C)  Heart Rate:  [] 82  Resp:  [14-18] 18  BP: (101-135)/(69-82) 128/79      Physical Exam:  Awake moderately alert confused rambling speech about being on disability having shoulder pain does not know about having a seizure maybe does know about having a seizure.  Fund of knowledge poor attention span concentration poor recent remote memory fair language function within normal limits he is not a phasic not oriented but very pleasant chuckle head type of speech, pupils one half constricting to 1 disc retinas could not be visualized extraocular movements full without nystagmus nasolabial folds palate tongue symmetrical normal hearing facial sensation head turning and shoulder shrug motor not easy to tell but probably 5 out of 5 x 4 he has some skin changes in his legs bilaterally possibly some distal atrophy in the feet and legs bilaterally no fasciculations or resting tremor reflexes trace throughout symmetrical toes downgoing bilaterally he could not follow sensation coordination and I did not trust his station and gait for fear he would fall heart regular without murmur neck supple without bruits extremities no clubbing there is some reddening and no edema visual acuity normal at 3 feet    Results  review:   I reviewed the patient's new clinical results.    Data review:  Lab Results (last 24 hours)     Procedure Component Value Units Date/Time    Troponin [[id number]]  (Normal) Collected:  06/23/19 0724    Specimen:  Blood Updated:  06/23/19 1333     Troponin T <0.010 ng/mL     Narrative:       Troponin T Reference Range:  <= 0.03 ng/mL-   Negative for AMI  >0.03 ng/mL-     Abnormal for myocardial necrosis.  Clinicians would have to utilize clinical acumen, EKG, Troponin and serial changes to determine if it is an Acute Myocardial Infarction or myocardial injury due to an underlying chronic condition.     Comprehensive Metabolic Panel [[id number]]  (Abnormal) Collected:  06/23/19 0724    Specimen:  Blood Updated:  06/23/19 0810     Glucose 90 mg/dL      BUN 14 mg/dL      Creatinine 0.75 mg/dL      Sodium 138 mmol/L      Potassium 4.2 mmol/L      Chloride 101 mmol/L      CO2 24.0 mmol/L      Calcium 8.8 mg/dL      Total Protein 7.0 g/dL      Albumin 4.20 g/dL      ALT (SGPT) 11 U/L      AST (SGOT) 18 U/L      Alkaline Phosphatase 75 U/L      Total Bilirubin 0.4 mg/dL      eGFR Non African Amer 111 mL/min/1.73      Globulin 2.8 gm/dL      A/G Ratio 1.5 g/dL      BUN/Creatinine Ratio 18.7     Anion Gap 13.0 mmol/L     Narrative:       GFR Normal >60  Chronic Kidney Disease <60  Kidney Failure <15    CK [[id number]]  (Normal) Collected:  06/23/19 0724    Specimen:  Blood Updated:  06/23/19 0809     Creatine Kinase 178 U/L     Magnesium [[id number]]  (Normal) Collected:  06/23/19 0724    Specimen:  Blood Updated:  06/23/19 0809     Magnesium 2.1 mg/dL     CBC Auto Differential [[id number]]  (Normal) Collected:  06/23/19 0724    Specimen:  Blood Updated:  06/23/19 0751     WBC 5.92 10*3/mm3      RBC 4.89 10*6/mm3      Hemoglobin 14.7 g/dL      Hematocrit 43.3 %      MCV 88.5 fL      MCH 30.1 pg      MCHC 33.9 g/dL      RDW 12.4 %      RDW-SD 40.7 fl      MPV 9.5 fL      Platelets 194 10*3/mm3      Neutrophil % 64.4 %       Lymphocyte % 22.6 %      Monocyte % 8.3 %      Eosinophil % 3.7 %      Basophil % 0.7 %      Immature Grans % 0.3 %      Neutrophils, Absolute 3.81 10*3/mm3      Lymphocytes, Absolute 1.34 10*3/mm3      Monocytes, Absolute 0.49 10*3/mm3      Eosinophils, Absolute 0.22 10*3/mm3      Basophils, Absolute 0.04 10*3/mm3      Immature Grans, Absolute 0.02 10*3/mm3      nRBC 0.0 /100 WBC     Urine Drug Screen - Urine, Clean Catch [[id number]]  (Normal) Collected:  06/22/19 1811    Specimen:  Urine, Clean Catch Updated:  06/22/19 1904     Amphet/Methamphet, Screen Negative     Barbiturates Screen, Urine Negative     Benzodiazepine Screen, Urine Negative     Cocaine Screen, Urine Negative     Opiate Screen Negative     THC, Screen, Urine Negative     Methadone Screen, Urine Negative     Oxycodone Screen, Urine Negative    Narrative:       Negative Thresholds For Drugs Screened:     Amphetamines               500 ng/ml   Barbiturates               200 ng/ml   Benzodiazepines            100 ng/ml   Cocaine                    300 ng/ml   Methadone                  300 ng/ml   Opiates                    300 ng/ml   Oxycodone                  100 ng/ml   THC                        50 ng/ml    The Normal Value for all drugs tested is negative. This report includes final unconfirmed screening results to be used for medical treatment purposes only. Unconfirmed results must not be used for non-medical purposes such as employment or legal testing. Clinical consideration should be applied to any drug of abuse test, particulary when unconfirmed results are used.    Comprehensive Metabolic Panel [[id number]]  (Abnormal) Collected:  06/22/19 1809    Specimen:  Blood Updated:  06/22/19 1854     Glucose 85 mg/dL      BUN 13 mg/dL      Creatinine 1.07 mg/dL      Sodium 134 mmol/L      Potassium 3.7 mmol/L      Chloride 92 mmol/L      CO2 19.6 mmol/L      Calcium 8.3 mg/dL      Total Protein 7.0 g/dL      Albumin 4.20 g/dL      ALT (SGPT) 14  U/L      AST (SGOT) 20 U/L      Alkaline Phosphatase 90 U/L      Total Bilirubin <0.2 mg/dL      eGFR Non African Amer 73 mL/min/1.73      Globulin 2.8 gm/dL      A/G Ratio 1.5 g/dL      BUN/Creatinine Ratio 12.1     Anion Gap 22.4 mmol/L     Narrative:       GFR Normal >60  Chronic Kidney Disease <60  Kidney Failure <15    Valproic Acid Level, Total [[id number]]  (Abnormal) Collected:  06/22/19 1809    Specimen:  Blood Updated:  06/22/19 1850     Valproic Acid 29.0 mcg/mL     Ethanol [[id number]]  (Abnormal) Collected:  06/22/19 1809    Specimen:  Blood Updated:  06/22/19 1850     Ethanol 179 mg/dL      Ethanol % 0.179 %     CBC & Differential [[id number]] Collected:  06/22/19 1809    Specimen:  Blood Updated:  06/22/19 1824    Narrative:       The following orders were created for panel order CBC & Differential.  Procedure                               Abnormality         Status                     ---------                               -----------         ------                     CBC Auto Differential[[id number]]        Normal              Final result                 Please view results for these tests on the individual orders.    CBC Auto Differential [[id number]]  (Normal) Collected:  06/22/19 1809    Specimen:  Blood Updated:  06/22/19 1824     WBC 8.24 10*3/mm3      RBC 4.62 10*6/mm3      Hemoglobin 13.9 g/dL      Hematocrit 40.6 %      MCV 87.9 fL      MCH 30.1 pg      MCHC 34.2 g/dL      RDW 12.3 %      RDW-SD 39.7 fl      MPV 9.6 fL      Platelets 211 10*3/mm3      Neutrophil % 65.6 %      Lymphocyte % 23.5 %      Monocyte % 7.6 %      Eosinophil % 2.4 %      Basophil % 0.5 %      Immature Grans % 0.4 %      Neutrophils, Absolute 5.40 10*3/mm3      Lymphocytes, Absolute 1.94 10*3/mm3      Monocytes, Absolute 0.63 10*3/mm3      Eosinophils, Absolute 0.20 10*3/mm3      Basophils, Absolute 0.04 10*3/mm3      Immature Grans, Absolute 0.03 10*3/mm3      nRBC 0.0 /100 WBC            Imaging:  Imaging Results  (last 24 hours)     Procedure Component Value Units Date/Time    XR Shoulder 2+ View Left [[id number]] Collected:  06/23/19 1504     Updated:  06/23/19 1508    Narrative:       XR SHOULDER 2+ VW LEFT-     INDICATIONS: Pain     TECHNIQUE: 2 VIEWS OF THE LEFT SHOULDER     COMPARISON: None available     FINDINGS:      No acute fracture is identified. No dislocation is noted. Mild  hypertrophic degenerative change at the acromioclavicular joint. Soft  tissues appear unremarkable.       Impression:          No acute fracture is identified. If further imaging evaluation is  indicated, MRI could be considered.           This report was finalized on 6/23/2019 3:05 PM by Dr. Joni Rasmussen M.D.       CT Head Without Contrast [[id number]] Collected:  06/22/19 1945     Updated:  06/22/19 2023    Narrative:       CT HEAD AND CERVICAL SPINE     HISTORY: Head injury, seizure     COMPARISON: None     TECHNIQUE: CT was performed of the head. CT was performed of the  cervical spine with axial, coronal, and sagittal reformatted images. No  intravenous contrast was administered. Radiation dose reduction  techniques were utilized, including automated exposure control and  exposure modulation based on body size.     FINDINGS:     CT head:  There is no finding of acute infarct, hemorrhage, contusion or abnormal  extra-axial collection. No hydrocephalus is present.     Plate-screw devices are present within the left aspect of the face. No  calvarial fracture is visualized.     CT cervical spine:  There is no fracture or malalignment of the cervical spine. There is no  prevertebral soft tissue swelling.       Impression:       Impression:  1.  No findings of acute intracranial abnormality.  No cervical spine  fracture or malalignment.     This report was finalized on 6/22/2019 8:20 PM by Dr. Mathew Camarillo M.D.       CT Cervical Spine Without Contrast [[id number]] Collected:  06/22/19 1945     Updated:  06/22/19 2023    Narrative:        CT HEAD AND CERVICAL SPINE     HISTORY: Head injury, seizure     COMPARISON: None     TECHNIQUE: CT was performed of the head. CT was performed of the  cervical spine with axial, coronal, and sagittal reformatted images. No  intravenous contrast was administered. Radiation dose reduction  techniques were utilized, including automated exposure control and  exposure modulation based on body size.     FINDINGS:     CT head:  There is no finding of acute infarct, hemorrhage, contusion or abnormal  extra-axial collection. No hydrocephalus is present.     Plate-screw devices are present within the left aspect of the face. No  calvarial fracture is visualized.     CT cervical spine:  There is no fracture or malalignment of the cervical spine. There is no  prevertebral soft tissue swelling.       Impression:       Impression:  1.  No findings of acute intracranial abnormality.  No cervical spine  fracture or malalignment.     This report was finalized on 6/22/2019 8:20 PM by Dr. Mathew Camarillo M.D.                Assessment and Plan:       Status epilepticus (CMS/HCC)    For the time being we are keeping him on Keppra 1000 IV every 12 hours but this patient almost certainly has seizure disorders secondary to his mix of drugs including methamphetamines.  He has a metabolic encephalopathy and it is possible he has organic brain syndrome secondary to years of drug abuse.  6 seizures in a row is consistent with status epilepticus.  He is now seizure-free  It is possible that this patient has an underlying true primary seizure disorder but it is nearly impossible to tell in a patient who test positive for methamphetamine!  we will check an EEG tomorrow make further recommendations the CT of his head by my independent eyeball review is normal.      Willie Monae MD  06/23/19  4:21 PM

## 2019-06-23 NOTE — NURSING NOTE
Patient having complaints of severe stabbing left shoulder pain.  Pt states it feels like lightning bolts going up his neck and down his side.  He states it makes his arm/shoulder drawl up like a yahir horse.

## 2019-06-24 NOTE — DISCHARGE SUMMARY
Patient Active Problem List   Diagnosis   • Status epilepticus (CMS/HCC)    shoulder pain      The patient has left AMA.  Please see H&P for admission details.  The patient was concerned about his service pet.  And the patient was told his service PET could come and stay with him at the hospital but needed to provide proof of immunizations.  The nurse reports that the patient is coherent understanding of risks and benefits associated with seizures and the possibility of death.  The patient was not given instructions as he has left AGAINST MEDICAL ADVICE before instructions could be given to him by this physician.

## 2019-07-28 ENCOUNTER — HOSPITAL ENCOUNTER (OUTPATIENT)
Facility: HOSPITAL | Age: 49
Setting detail: OBSERVATION
Discharge: HOME | End: 2019-07-30
Attending: EMERGENCY MEDICINE | Admitting: EMERGENCY MEDICINE

## 2019-07-28 ENCOUNTER — APPOINTMENT (OUTPATIENT)
Dept: GENERAL RADIOLOGY | Facility: HOSPITAL | Age: 49
End: 2019-07-28

## 2019-07-28 ENCOUNTER — APPOINTMENT (OUTPATIENT)
Dept: CT IMAGING | Facility: HOSPITAL | Age: 49
End: 2019-07-28

## 2019-07-28 DIAGNOSIS — R56.9 SEIZURE (HCC): Primary | ICD-10-CM

## 2019-07-28 DIAGNOSIS — E87.1 HYPONATREMIA: ICD-10-CM

## 2019-07-28 DIAGNOSIS — F10.10 ALCOHOL ABUSE: ICD-10-CM

## 2019-07-28 DIAGNOSIS — S00.83XA CONTUSION OF FACE, INITIAL ENCOUNTER: ICD-10-CM

## 2019-07-28 LAB
APTT PPP: 27.6 SECONDS (ref 24–31)
BASOPHILS # BLD AUTO: 0.1 10*3/MM3 (ref 0–0.2)
BASOPHILS NFR BLD AUTO: 1 % (ref 0–1.5)
DEPRECATED RDW RBC AUTO: 41.6 FL (ref 37–54)
EOSINOPHIL # BLD AUTO: 0.1 10*3/MM3 (ref 0–0.4)
EOSINOPHIL NFR BLD AUTO: 1.8 % (ref 0.3–6.2)
ERYTHROCYTE [DISTWIDTH] IN BLOOD BY AUTOMATED COUNT: 13.3 % (ref 12.3–15.4)
HCT VFR BLD AUTO: 42.5 % (ref 37.5–51)
HGB BLD-MCNC: 14.5 G/DL (ref 13–17.7)
INR PPP: 0.98 (ref 0.9–1.1)
LYMPHOCYTES # BLD AUTO: 1.4 10*3/MM3 (ref 0.7–3.1)
LYMPHOCYTES NFR BLD AUTO: 18.2 % (ref 19.6–45.3)
MCH RBC QN AUTO: 30.4 PG (ref 26.6–33)
MCHC RBC AUTO-ENTMCNC: 34 G/DL (ref 31.5–35.7)
MCV RBC AUTO: 89.5 FL (ref 79–97)
MONOCYTES # BLD AUTO: 0.5 10*3/MM3 (ref 0.1–0.9)
MONOCYTES NFR BLD AUTO: 6.1 % (ref 5–12)
NEUTROPHILS # BLD AUTO: 5.5 10*3/MM3 (ref 1.7–7)
NEUTROPHILS NFR BLD AUTO: 72.9 % (ref 42.7–76)
NRBC BLD AUTO-RTO: 0 /100 WBC (ref 0–0.2)
PLATELET # BLD AUTO: 255 10*3/MM3 (ref 140–450)
PMV BLD AUTO: 7.2 FL (ref 6–12)
PROTHROMBIN TIME: 10.1 SECONDS (ref 9.6–11.7)
RBC # BLD AUTO: 4.75 10*6/MM3 (ref 4.14–5.8)
WBC NRBC COR # BLD: 7.6 10*3/MM3 (ref 3.4–10.8)

## 2019-07-28 PROCEDURE — 85025 COMPLETE CBC W/AUTO DIFF WBC: CPT | Performed by: EMERGENCY MEDICINE

## 2019-07-28 PROCEDURE — 72128 CT CHEST SPINE W/O DYE: CPT

## 2019-07-28 PROCEDURE — 96375 TX/PRO/DX INJ NEW DRUG ADDON: CPT

## 2019-07-28 PROCEDURE — 83735 ASSAY OF MAGNESIUM: CPT | Performed by: EMERGENCY MEDICINE

## 2019-07-28 PROCEDURE — 99285 EMERGENCY DEPT VISIT HI MDM: CPT

## 2019-07-28 PROCEDURE — 72125 CT NECK SPINE W/O DYE: CPT

## 2019-07-28 PROCEDURE — 84484 ASSAY OF TROPONIN QUANT: CPT | Performed by: EMERGENCY MEDICINE

## 2019-07-28 PROCEDURE — 80164 ASSAY DIPROPYLACETIC ACD TOT: CPT | Performed by: EMERGENCY MEDICINE

## 2019-07-28 PROCEDURE — 80053 COMPREHEN METABOLIC PANEL: CPT | Performed by: EMERGENCY MEDICINE

## 2019-07-28 PROCEDURE — 82550 ASSAY OF CK (CPK): CPT | Performed by: EMERGENCY MEDICINE

## 2019-07-28 PROCEDURE — 25010000002 LORAZEPAM PER 2 MG: Performed by: EMERGENCY MEDICINE

## 2019-07-28 PROCEDURE — 80307 DRUG TEST PRSMV CHEM ANLYZR: CPT | Performed by: EMERGENCY MEDICINE

## 2019-07-28 PROCEDURE — 71045 X-RAY EXAM CHEST 1 VIEW: CPT

## 2019-07-28 PROCEDURE — 82962 GLUCOSE BLOOD TEST: CPT

## 2019-07-28 PROCEDURE — 93005 ELECTROCARDIOGRAM TRACING: CPT | Performed by: EMERGENCY MEDICINE

## 2019-07-28 PROCEDURE — 85610 PROTHROMBIN TIME: CPT | Performed by: EMERGENCY MEDICINE

## 2019-07-28 PROCEDURE — 70450 CT HEAD/BRAIN W/O DYE: CPT

## 2019-07-28 PROCEDURE — 70486 CT MAXILLOFACIAL W/O DYE: CPT

## 2019-07-28 PROCEDURE — 96365 THER/PROPH/DIAG IV INF INIT: CPT

## 2019-07-28 PROCEDURE — 25010000003 LEVETIRACETAM IN NACL 0.75% 1000 MG/100ML SOLUTION: Performed by: EMERGENCY MEDICINE

## 2019-07-28 PROCEDURE — 85730 THROMBOPLASTIN TIME PARTIAL: CPT | Performed by: EMERGENCY MEDICINE

## 2019-07-28 RX ORDER — LORAZEPAM 2 MG/ML
1 INJECTION INTRAMUSCULAR ONCE
Status: COMPLETED | OUTPATIENT
Start: 2019-07-28 | End: 2019-07-28

## 2019-07-28 RX ORDER — SODIUM CHLORIDE 0.9 % (FLUSH) 0.9 %
10 SYRINGE (ML) INJECTION AS NEEDED
Status: DISCONTINUED | OUTPATIENT
Start: 2019-07-28 | End: 2019-07-30 | Stop reason: HOSPADM

## 2019-07-28 RX ORDER — LEVETIRACETAM 10 MG/ML
1000 INJECTION INTRAVASCULAR ONCE
Status: COMPLETED | OUTPATIENT
Start: 2019-07-28 | End: 2019-07-29

## 2019-07-28 RX ADMIN — LEVETIRACETAM 1000 MG: 10 INJECTION INTRAVENOUS at 23:40

## 2019-07-28 RX ADMIN — LORAZEPAM 1 MG: 2 INJECTION INTRAMUSCULAR; INTRAVENOUS at 23:40

## 2019-07-29 PROBLEM — F10.10 ALCOHOL ABUSE: Status: ACTIVE | Noted: 2019-07-29

## 2019-07-29 PROBLEM — R56.9 SEIZURE (HCC): Status: ACTIVE | Noted: 2019-07-29

## 2019-07-29 PROBLEM — S00.93XA HEAD CONTUSION: Status: ACTIVE | Noted: 2019-07-29

## 2019-07-29 LAB
ALBUMIN SERPL-MCNC: 3.5 G/DL (ref 3.5–4.8)
ALBUMIN SERPL-MCNC: 4.2 G/DL (ref 3.5–4.8)
ALBUMIN/GLOB SERPL: 1.4 G/DL (ref 1–1.7)
ALP SERPL-CCNC: 78 U/L (ref 32–91)
ALP SERPL-CCNC: 95 U/L (ref 32–91)
ALT SERPL W P-5'-P-CCNC: 15 U/L (ref 17–63)
ALT SERPL W P-5'-P-CCNC: 20 U/L (ref 17–63)
AMPHET+METHAMPHET UR QL: NEGATIVE
ANION GAP SERPL CALCULATED.3IONS-SCNC: 16.3 MMOL/L (ref 5–15)
ANION GAP SERPL CALCULATED.3IONS-SCNC: 26.5 MMOL/L (ref 5–15)
AST SERPL-CCNC: 20 U/L (ref 15–41)
AST SERPL-CCNC: 28 U/L (ref 15–41)
BACTERIA UR QL AUTO: NORMAL /HPF
BARBITURATES UR QL SCN: NEGATIVE
BASOPHILS # BLD AUTO: 0 10*3/MM3 (ref 0–0.2)
BASOPHILS NFR BLD AUTO: 0.5 % (ref 0–1.5)
BENZODIAZ UR QL SCN: POSITIVE
BILIRUB CONJ SERPL-MCNC: 0.1 MG/DL (ref 0.1–0.5)
BILIRUB INDIRECT SERPL-MCNC: 0.5 MG/DL (ref 0–1.1)
BILIRUB SERPL-MCNC: 0.3 MG/DL (ref 0.3–1.2)
BILIRUB SERPL-MCNC: 0.6 MG/DL (ref 0.3–1.2)
BILIRUB UR QL STRIP: NEGATIVE
BUN BLD-MCNC: 11 MG/DL (ref 8–20)
BUN BLD-MCNC: 16 MG/DL (ref 8–20)
BUN/CREAT SERPL: 13.8 (ref 6.2–20.3)
BUN/CREAT SERPL: 14.5 (ref 6.2–20.3)
CALCIUM SPEC-SCNC: 7.8 MG/DL (ref 8.9–10.3)
CALCIUM SPEC-SCNC: 8.1 MG/DL (ref 8.9–10.3)
CANNABINOIDS SERPL QL: NEGATIVE
CHLORIDE SERPL-SCNC: 90 MMOL/L (ref 101–111)
CHLORIDE SERPL-SCNC: 96 MMOL/L (ref 101–111)
CK SERPL-CCNC: 188 U/L (ref 49–397)
CLARITY UR: CLEAR
CO2 SERPL-SCNC: 11 MMOL/L (ref 22–32)
CO2 SERPL-SCNC: 19 MMOL/L (ref 22–32)
COCAINE UR QL: POSITIVE
COLOR UR: YELLOW
CREAT BLD-MCNC: 0.8 MG/DL (ref 0.7–1.2)
CREAT BLD-MCNC: 1.1 MG/DL (ref 0.7–1.2)
CREAT UR-MCNC: 44.9 MG/DL
DEPRECATED RDW RBC AUTO: 41.1 FL (ref 37–54)
EOSINOPHIL # BLD AUTO: 0.2 10*3/MM3 (ref 0–0.4)
EOSINOPHIL NFR BLD AUTO: 2.7 % (ref 0.3–6.2)
ERYTHROCYTE [DISTWIDTH] IN BLOOD BY AUTOMATED COUNT: 13.2 % (ref 12.3–15.4)
ETHANOL UR QL: 0.1 %
GFR SERPL CREATININE-BSD FRML MDRD: 103 ML/MIN/1.73
GFR SERPL CREATININE-BSD FRML MDRD: 71 ML/MIN/1.73
GLOBULIN UR ELPH-MCNC: 3.1 GM/DL (ref 2.5–3.8)
GLUCOSE BLD-MCNC: 81 MG/DL (ref 65–99)
GLUCOSE BLD-MCNC: 88 MG/DL (ref 65–99)
GLUCOSE BLDC GLUCOMTR-MCNC: 81 MG/DL (ref 70–105)
GLUCOSE UR STRIP-MCNC: NEGATIVE MG/DL
HCT VFR BLD AUTO: 40.6 % (ref 37.5–51)
HGB BLD-MCNC: 13.8 G/DL (ref 13–17.7)
HGB UR QL STRIP.AUTO: ABNORMAL
HOLD SPECIMEN: NORMAL
HOLD SPECIMEN: NORMAL
HYALINE CASTS UR QL AUTO: NORMAL /LPF
KETONES UR QL STRIP: NEGATIVE
LEUKOCYTE ESTERASE UR QL STRIP.AUTO: NEGATIVE
LYMPHOCYTES # BLD AUTO: 1.4 10*3/MM3 (ref 0.7–3.1)
LYMPHOCYTES NFR BLD AUTO: 22.3 % (ref 19.6–45.3)
MAGNESIUM SERPL-MCNC: 2 MG/DL (ref 1.8–2.5)
MAGNESIUM SERPL-MCNC: 2.4 MG/DL (ref 1.8–2.5)
MCH RBC QN AUTO: 30.4 PG (ref 26.6–33)
MCHC RBC AUTO-ENTMCNC: 34 G/DL (ref 31.5–35.7)
MCV RBC AUTO: 89.4 FL (ref 79–97)
METHADONE UR QL SCN: NEGATIVE
MONOCYTES # BLD AUTO: 0.4 10*3/MM3 (ref 0.1–0.9)
MONOCYTES NFR BLD AUTO: 6.9 % (ref 5–12)
NEUTROPHILS # BLD AUTO: 4.3 10*3/MM3 (ref 1.7–7)
NEUTROPHILS NFR BLD AUTO: 67.6 % (ref 42.7–76)
NITRITE UR QL STRIP: NEGATIVE
NRBC BLD AUTO-RTO: 0 /100 WBC (ref 0–0.2)
OPIATES UR QL: NEGATIVE
PCP UR QL SCN: NEGATIVE
PH UR STRIP.AUTO: <=5 [PH] (ref 5–8)
PLATELET # BLD AUTO: 219 10*3/MM3 (ref 140–450)
PMV BLD AUTO: 7.3 FL (ref 6–12)
POTASSIUM BLD-SCNC: 3.3 MMOL/L (ref 3.6–5.1)
POTASSIUM BLD-SCNC: 3.5 MMOL/L (ref 3.6–5.1)
PROT SERPL-MCNC: 6 G/DL (ref 6.1–7.9)
PROT SERPL-MCNC: 7.3 G/DL (ref 6.1–7.9)
PROT UR QL STRIP: NEGATIVE
RBC # BLD AUTO: 4.55 10*6/MM3 (ref 4.14–5.8)
RBC # UR: NORMAL /HPF
REF LAB TEST METHOD: NORMAL
SODIUM BLD-SCNC: 124 MMOL/L (ref 136–144)
SODIUM BLD-SCNC: 128 MMOL/L (ref 136–144)
SP GR UR STRIP: 1.01 (ref 1–1.03)
SQUAMOUS #/AREA URNS HPF: NORMAL /HPF
TROPONIN I SERPL-MCNC: <0.03 NG/ML (ref 0–0.03)
UROBILINOGEN UR QL STRIP: ABNORMAL
VALPROATE SERPL-MCNC: <10 MCG/ML (ref 50–100)
WBC NRBC COR # BLD: 6.4 10*3/MM3 (ref 3.4–10.8)
WBC UR QL AUTO: NORMAL /HPF

## 2019-07-29 PROCEDURE — G0378 HOSPITAL OBSERVATION PER HR: HCPCS

## 2019-07-29 PROCEDURE — 83735 ASSAY OF MAGNESIUM: CPT | Performed by: NURSE PRACTITIONER

## 2019-07-29 PROCEDURE — 80307 DRUG TEST PRSMV CHEM ANLYZR: CPT | Performed by: EMERGENCY MEDICINE

## 2019-07-29 PROCEDURE — 25010000002 MAGNESIUM SULFATE PER 500 MG OF MAGNESIUM: Performed by: EMERGENCY MEDICINE

## 2019-07-29 PROCEDURE — 25010000002 THIAMINE PER 100 MG: Performed by: EMERGENCY MEDICINE

## 2019-07-29 PROCEDURE — 85025 COMPLETE CBC W/AUTO DIFF WBC: CPT | Performed by: NURSE PRACTITIONER

## 2019-07-29 PROCEDURE — 81001 URINALYSIS AUTO W/SCOPE: CPT | Performed by: EMERGENCY MEDICINE

## 2019-07-29 PROCEDURE — 96367 TX/PROPH/DG ADDL SEQ IV INF: CPT

## 2019-07-29 PROCEDURE — 99220 PR INITIAL OBSERVATION CARE/DAY 70 MINUTES: CPT | Performed by: NURSE PRACTITIONER

## 2019-07-29 PROCEDURE — 80048 BASIC METABOLIC PNL TOTAL CA: CPT | Performed by: NURSE PRACTITIONER

## 2019-07-29 PROCEDURE — 96366 THER/PROPH/DIAG IV INF ADDON: CPT

## 2019-07-29 PROCEDURE — 82570 ASSAY OF URINE CREATININE: CPT | Performed by: EMERGENCY MEDICINE

## 2019-07-29 PROCEDURE — 80076 HEPATIC FUNCTION PANEL: CPT | Performed by: NURSE PRACTITIONER

## 2019-07-29 RX ORDER — VALPROIC ACID 250 MG/1
750 CAPSULE, LIQUID FILLED ORAL 2 TIMES DAILY
Status: DISCONTINUED | OUTPATIENT
Start: 2019-07-29 | End: 2019-07-29

## 2019-07-29 RX ORDER — MELOXICAM 7.5 MG/1
7.5 TABLET ORAL 2 TIMES DAILY PRN
COMMUNITY
End: 2020-04-09

## 2019-07-29 RX ORDER — CHLORDIAZEPOXIDE HYDROCHLORIDE 25 MG/1
25 CAPSULE, GELATIN COATED ORAL EVERY 8 HOURS PRN
Status: DISCONTINUED | OUTPATIENT
Start: 2019-07-29 | End: 2019-07-30 | Stop reason: HOSPADM

## 2019-07-29 RX ORDER — LEVETIRACETAM 500 MG/1
500 TABLET ORAL 2 TIMES DAILY
Status: DISCONTINUED | OUTPATIENT
Start: 2019-07-29 | End: 2019-07-30 | Stop reason: HOSPADM

## 2019-07-29 RX ORDER — LEVETIRACETAM 500 MG/1
500 TABLET ORAL 2 TIMES DAILY
Status: ON HOLD | COMMUNITY
End: 2019-07-30 | Stop reason: SDUPTHER

## 2019-07-29 RX ORDER — SODIUM CHLORIDE 0.9 % (FLUSH) 0.9 %
3-10 SYRINGE (ML) INJECTION AS NEEDED
Status: DISCONTINUED | OUTPATIENT
Start: 2019-07-29 | End: 2019-07-30 | Stop reason: HOSPADM

## 2019-07-29 RX ORDER — ONDANSETRON 2 MG/ML
4 INJECTION INTRAMUSCULAR; INTRAVENOUS EVERY 6 HOURS PRN
Status: DISCONTINUED | OUTPATIENT
Start: 2019-07-29 | End: 2019-07-30 | Stop reason: HOSPADM

## 2019-07-29 RX ORDER — LORAZEPAM 2 MG/ML
1 INJECTION INTRAMUSCULAR EVERY 4 HOURS PRN
Status: DISCONTINUED | OUTPATIENT
Start: 2019-07-29 | End: 2019-07-30 | Stop reason: HOSPADM

## 2019-07-29 RX ORDER — ONDANSETRON 4 MG/1
4 TABLET, FILM COATED ORAL EVERY 6 HOURS PRN
Status: DISCONTINUED | OUTPATIENT
Start: 2019-07-29 | End: 2019-07-30 | Stop reason: HOSPADM

## 2019-07-29 RX ORDER — LORAZEPAM 2 MG/ML
1 INJECTION INTRAMUSCULAR EVERY 8 HOURS
Status: DISCONTINUED | OUTPATIENT
Start: 2019-07-30 | End: 2019-07-29

## 2019-07-29 RX ORDER — LORAZEPAM 2 MG/ML
1 INJECTION INTRAMUSCULAR EVERY 6 HOURS
Status: DISCONTINUED | OUTPATIENT
Start: 2019-07-29 | End: 2019-07-29

## 2019-07-29 RX ORDER — SODIUM CHLORIDE 0.9 % (FLUSH) 0.9 %
3 SYRINGE (ML) INJECTION EVERY 12 HOURS SCHEDULED
Status: DISCONTINUED | OUTPATIENT
Start: 2019-07-29 | End: 2019-07-30 | Stop reason: HOSPADM

## 2019-07-29 RX ADMIN — FOLIC ACID 1000 ML/HR: 5 INJECTION, SOLUTION INTRAMUSCULAR; INTRAVENOUS; SUBCUTANEOUS at 00:00

## 2019-07-29 RX ADMIN — OXCARBAZEPINE 900 MG: 150 TABLET, FILM COATED ORAL at 20:15

## 2019-07-29 RX ADMIN — Medication 3 ML: at 08:29

## 2019-07-29 RX ADMIN — SERTRALINE HYDROCHLORIDE 75 MG: 50 TABLET ORAL at 08:27

## 2019-07-29 RX ADMIN — LEVETIRACETAM 500 MG: 500 TABLET, FILM COATED ORAL at 20:15

## 2019-07-29 RX ADMIN — OXCARBAZEPINE 900 MG: 150 TABLET, FILM COATED ORAL at 08:35

## 2019-07-29 RX ADMIN — Medication 3 ML: at 20:16

## 2019-07-29 RX ADMIN — LEVETIRACETAM 500 MG: 500 TABLET, FILM COATED ORAL at 08:27

## 2019-07-30 VITALS
SYSTOLIC BLOOD PRESSURE: 111 MMHG | HEART RATE: 78 BPM | OXYGEN SATURATION: 97 % | TEMPERATURE: 97.6 F | DIASTOLIC BLOOD PRESSURE: 64 MMHG | BODY MASS INDEX: 39.04 KG/M2 | RESPIRATION RATE: 16 BRPM | WEIGHT: 234.35 LBS | HEIGHT: 65 IN

## 2019-07-30 LAB
ANION GAP SERPL CALCULATED.3IONS-SCNC: 13.5 MMOL/L (ref 5–15)
BUN BLD-MCNC: 10 MG/DL (ref 8–20)
BUN/CREAT SERPL: 10 (ref 6.2–20.3)
CALCIUM SPEC-SCNC: 8.3 MG/DL (ref 8.9–10.3)
CHLORIDE SERPL-SCNC: 97 MMOL/L (ref 101–111)
CO2 SERPL-SCNC: 20 MMOL/L (ref 22–32)
CREAT BLD-MCNC: 1 MG/DL (ref 0.7–1.2)
GFR SERPL CREATININE-BSD FRML MDRD: 79 ML/MIN/1.73
GLUCOSE BLD-MCNC: 166 MG/DL (ref 65–99)
MAGNESIUM SERPL-MCNC: 1.9 MG/DL (ref 1.8–2.5)
POTASSIUM BLD-SCNC: 3.5 MMOL/L (ref 3.6–5.1)
SODIUM BLD-SCNC: 127 MMOL/L (ref 136–144)

## 2019-07-30 PROCEDURE — 99217 PR OBSERVATION CARE DISCHARGE MANAGEMENT: CPT | Performed by: INTERNAL MEDICINE

## 2019-07-30 PROCEDURE — 83735 ASSAY OF MAGNESIUM: CPT | Performed by: NURSE PRACTITIONER

## 2019-07-30 PROCEDURE — G0378 HOSPITAL OBSERVATION PER HR: HCPCS

## 2019-07-30 PROCEDURE — 80048 BASIC METABOLIC PNL TOTAL CA: CPT | Performed by: INTERNAL MEDICINE

## 2019-07-30 RX ORDER — LEVETIRACETAM 500 MG/1
500 TABLET ORAL 2 TIMES DAILY
Qty: 60 TABLET | Refills: 0 | Status: SHIPPED | OUTPATIENT
Start: 2019-07-30 | End: 2020-04-09

## 2019-07-30 RX ORDER — OXCARBAZEPINE 300 MG/1
900 TABLET, FILM COATED ORAL 2 TIMES DAILY
Qty: 60 TABLET | Refills: 0 | Status: SHIPPED | OUTPATIENT
Start: 2019-07-30

## 2019-07-30 RX ADMIN — OXCARBAZEPINE 900 MG: 150 TABLET, FILM COATED ORAL at 10:35

## 2019-07-30 RX ADMIN — SERTRALINE HYDROCHLORIDE 75 MG: 50 TABLET ORAL at 10:34

## 2019-07-30 RX ADMIN — LEVETIRACETAM 500 MG: 500 TABLET, FILM COATED ORAL at 10:34

## 2019-07-30 RX ADMIN — Medication 3 ML: at 10:40

## 2019-07-31 ENCOUNTER — READMISSION MANAGEMENT (OUTPATIENT)
Dept: CALL CENTER | Facility: HOSPITAL | Age: 49
End: 2019-07-31

## 2019-07-31 NOTE — OUTREACH NOTE
Prep Survey      Responses   Facility patient discharged from?  [facility]   Is patient eligible?  No   What are the reasons patient is not eligible?  [facility] [Acute o chronic seizures d/t non-compliance, drug abuse,    Alcohol abuse and cocaine abuse ]   Does the patient have one of the following disease processes/diagnoses(primary or secondary)?  Other   Prep survey completed?  Yes          Jenae Reynolds RN

## 2019-08-07 ENCOUNTER — HOSPITAL ENCOUNTER (OUTPATIENT)
Facility: HOSPITAL | Age: 49
Setting detail: OBSERVATION
Discharge: HOME | End: 2019-08-08
Attending: EMERGENCY MEDICINE | Admitting: HOSPITALIST

## 2019-08-07 ENCOUNTER — APPOINTMENT (OUTPATIENT)
Dept: CT IMAGING | Facility: HOSPITAL | Age: 49
End: 2019-08-07

## 2019-08-07 DIAGNOSIS — R45.851 SUICIDAL IDEATION: Primary | ICD-10-CM

## 2019-08-07 DIAGNOSIS — F10.10 ALCOHOL ABUSE: ICD-10-CM

## 2019-08-07 DIAGNOSIS — G40.909 SEIZURE DISORDER (HCC): ICD-10-CM

## 2019-08-07 PROBLEM — Z87.828 HISTORY OF TRAUMATIC HEAD INJURY: Chronic | Status: ACTIVE | Noted: 2018-10-26

## 2019-08-07 PROBLEM — R56.9 SEIZURE (HCC): Chronic | Status: ACTIVE | Noted: 2019-07-29

## 2019-08-07 PROBLEM — F33.2 MAJOR DEPRESSIVE DISORDER, RECURRENT EPISODE, SEVERE (HCC): Chronic | Status: ACTIVE | Noted: 2019-08-07

## 2019-08-07 PROBLEM — Z87.828 HISTORY OF TRAUMATIC HEAD INJURY: Status: ACTIVE | Noted: 2018-10-26

## 2019-08-07 PROBLEM — S00.93XA HEAD CONTUSION: Chronic | Status: ACTIVE | Noted: 2019-07-29

## 2019-08-07 PROBLEM — G40.901 STATUS EPILEPTICUS (HCC): Chronic | Status: ACTIVE | Noted: 2019-06-22

## 2019-08-07 LAB
ALBUMIN SERPL-MCNC: 3.6 G/DL (ref 3.5–4.8)
ALBUMIN/GLOB SERPL: 1.4 G/DL (ref 1–1.7)
ALP SERPL-CCNC: 84 U/L (ref 32–91)
ALT SERPL W P-5'-P-CCNC: 16 U/L (ref 17–63)
AMPHET+METHAMPHET UR QL: NEGATIVE
ANION GAP SERPL CALCULATED.3IONS-SCNC: 14.4 MMOL/L (ref 5–15)
ANION GAP SERPL CALCULATED.3IONS-SCNC: 20.3 MMOL/L (ref 5–15)
APAP SERPL-MCNC: <10 MCG/ML (ref 10–30)
AST SERPL-CCNC: 22 U/L (ref 15–41)
BACTERIA UR QL AUTO: NORMAL /HPF
BARBITURATES UR QL SCN: NEGATIVE
BASOPHILS # BLD AUTO: 0.1 10*3/MM3 (ref 0–0.2)
BASOPHILS NFR BLD AUTO: 0.9 % (ref 0–1.5)
BENZODIAZ UR QL SCN: POSITIVE
BILIRUB SERPL-MCNC: 0.4 MG/DL (ref 0.3–1.2)
BILIRUB UR QL STRIP: NEGATIVE
BUN BLD-MCNC: 12 MG/DL (ref 8–20)
BUN BLD-MCNC: 7 MG/DL (ref 8–20)
BUN/CREAT SERPL: 10.9 (ref 6.2–20.3)
BUN/CREAT SERPL: 8.8 (ref 6.2–20.3)
CALCIUM SPEC-SCNC: 7.6 MG/DL (ref 8.9–10.3)
CALCIUM SPEC-SCNC: 7.6 MG/DL (ref 8.9–10.3)
CANNABINOIDS SERPL QL: NEGATIVE
CHLORIDE SERPL-SCNC: 104 MMOL/L (ref 101–111)
CHLORIDE SERPL-SCNC: 99 MMOL/L (ref 101–111)
CK SERPL-CCNC: 148 U/L (ref 20–200)
CLARITY UR: CLEAR
CO2 SERPL-SCNC: 15 MMOL/L (ref 22–32)
CO2 SERPL-SCNC: 19 MMOL/L (ref 22–32)
COCAINE UR QL: NEGATIVE
COLOR UR: YELLOW
CREAT BLD-MCNC: 0.8 MG/DL (ref 0.7–1.2)
CREAT BLD-MCNC: 1.1 MG/DL (ref 0.7–1.2)
CREAT UR-MCNC: 25.5 MG/DL
DEPRECATED RDW RBC AUTO: 42 FL (ref 37–54)
EOSINOPHIL # BLD AUTO: 0.2 10*3/MM3 (ref 0–0.4)
EOSINOPHIL NFR BLD AUTO: 3.1 % (ref 0.3–6.2)
ERYTHROCYTE [DISTWIDTH] IN BLOOD BY AUTOMATED COUNT: 13.1 % (ref 12.3–15.4)
ETHANOL UR QL: 0.17 %
GFR SERPL CREATININE-BSD FRML MDRD: 103 ML/MIN/1.73
GFR SERPL CREATININE-BSD FRML MDRD: 71 ML/MIN/1.73
GLOBULIN UR ELPH-MCNC: 2.6 GM/DL (ref 2.5–3.8)
GLUCOSE BLD-MCNC: 89 MG/DL (ref 65–99)
GLUCOSE BLD-MCNC: 94 MG/DL (ref 65–99)
GLUCOSE UR STRIP-MCNC: NEGATIVE MG/DL
HCT VFR BLD AUTO: 37.6 % (ref 37.5–51)
HGB BLD-MCNC: 12.9 G/DL (ref 13–17.7)
HGB UR QL STRIP.AUTO: ABNORMAL
HOLD SPECIMEN: NORMAL
HOLD SPECIMEN: NORMAL
HYALINE CASTS UR QL AUTO: NORMAL /LPF
KETONES UR QL STRIP: NEGATIVE
LEUKOCYTE ESTERASE UR QL STRIP.AUTO: NEGATIVE
LYMPHOCYTES # BLD AUTO: 1.6 10*3/MM3 (ref 0.7–3.1)
LYMPHOCYTES NFR BLD AUTO: 26.4 % (ref 19.6–45.3)
MCH RBC QN AUTO: 30.7 PG (ref 26.6–33)
MCHC RBC AUTO-ENTMCNC: 34.2 G/DL (ref 31.5–35.7)
MCV RBC AUTO: 89.9 FL (ref 79–97)
METHADONE UR QL SCN: NEGATIVE
MONOCYTES # BLD AUTO: 0.5 10*3/MM3 (ref 0.1–0.9)
MONOCYTES NFR BLD AUTO: 9.1 % (ref 5–12)
NEUTROPHILS # BLD AUTO: 3.6 10*3/MM3 (ref 1.7–7)
NEUTROPHILS NFR BLD AUTO: 60.5 % (ref 42.7–76)
NITRITE UR QL STRIP: NEGATIVE
NRBC BLD AUTO-RTO: 0.1 /100 WBC (ref 0–0.2)
OPIATES UR QL: NEGATIVE
PCP UR QL SCN: NEGATIVE
PH UR STRIP.AUTO: <=5 [PH] (ref 5–8)
PLATELET # BLD AUTO: 201 10*3/MM3 (ref 140–450)
PMV BLD AUTO: 6.9 FL (ref 6–12)
POTASSIUM BLD-SCNC: 3.3 MMOL/L (ref 3.6–5.1)
POTASSIUM BLD-SCNC: 3.4 MMOL/L (ref 3.6–5.1)
PROT SERPL-MCNC: 6.2 G/DL (ref 6.1–7.9)
PROT UR QL STRIP: NEGATIVE
RBC # BLD AUTO: 4.19 10*6/MM3 (ref 4.14–5.8)
RBC # UR: NORMAL /HPF
REF LAB TEST METHOD: NORMAL
SALICYLATES SERPL-MCNC: <4 MG/DL (ref 0–30)
SODIUM BLD-SCNC: 131 MMOL/L (ref 136–144)
SODIUM BLD-SCNC: 134 MMOL/L (ref 136–144)
SP GR UR STRIP: 1.01 (ref 1–1.03)
SQUAMOUS #/AREA URNS HPF: NORMAL /HPF
UROBILINOGEN UR QL STRIP: ABNORMAL
WBC NRBC COR # BLD: 5.9 10*3/MM3 (ref 3.4–10.8)
WBC UR QL AUTO: NORMAL /HPF
WHOLE BLOOD HOLD SPECIMEN: NORMAL

## 2019-08-07 PROCEDURE — 25010000003 LEVETIRACETAM IN NACL 0.75% 1000 MG/100ML SOLUTION: Performed by: EMERGENCY MEDICINE

## 2019-08-07 PROCEDURE — 80307 DRUG TEST PRSMV CHEM ANLYZR: CPT | Performed by: EMERGENCY MEDICINE

## 2019-08-07 PROCEDURE — 96361 HYDRATE IV INFUSION ADD-ON: CPT

## 2019-08-07 PROCEDURE — 99285 EMERGENCY DEPT VISIT HI MDM: CPT

## 2019-08-07 PROCEDURE — 25010000002 THIAMINE PER 100 MG: Performed by: EMERGENCY MEDICINE

## 2019-08-07 PROCEDURE — G0378 HOSPITAL OBSERVATION PER HR: HCPCS

## 2019-08-07 PROCEDURE — 99218 PR INITIAL OBSERVATION CARE/DAY 30 MINUTES: CPT | Performed by: NURSE PRACTITIONER

## 2019-08-07 PROCEDURE — 70450 CT HEAD/BRAIN W/O DYE: CPT

## 2019-08-07 PROCEDURE — 82570 ASSAY OF URINE CREATININE: CPT | Performed by: EMERGENCY MEDICINE

## 2019-08-07 PROCEDURE — 85025 COMPLETE CBC W/AUTO DIFF WBC: CPT | Performed by: EMERGENCY MEDICINE

## 2019-08-07 PROCEDURE — 25010000002 LORAZEPAM PER 2 MG: Performed by: EMERGENCY MEDICINE

## 2019-08-07 PROCEDURE — 25010000002 ZIPRASIDONE MESYLATE PER 10 MG: Performed by: EMERGENCY MEDICINE

## 2019-08-07 PROCEDURE — 82550 ASSAY OF CK (CPK): CPT | Performed by: EMERGENCY MEDICINE

## 2019-08-07 PROCEDURE — 99214 OFFICE O/P EST MOD 30 MIN: CPT | Performed by: PSYCHIATRY & NEUROLOGY

## 2019-08-07 PROCEDURE — 25010000002 MAGNESIUM SULFATE PER 500 MG OF MAGNESIUM: Performed by: EMERGENCY MEDICINE

## 2019-08-07 PROCEDURE — 96372 THER/PROPH/DIAG INJ SC/IM: CPT

## 2019-08-07 PROCEDURE — 96375 TX/PRO/DX INJ NEW DRUG ADDON: CPT

## 2019-08-07 PROCEDURE — 81001 URINALYSIS AUTO W/SCOPE: CPT | Performed by: EMERGENCY MEDICINE

## 2019-08-07 PROCEDURE — 96365 THER/PROPH/DIAG IV INF INIT: CPT

## 2019-08-07 PROCEDURE — 80053 COMPREHEN METABOLIC PANEL: CPT | Performed by: EMERGENCY MEDICINE

## 2019-08-07 PROCEDURE — 96367 TX/PROPH/DG ADDL SEQ IV INF: CPT

## 2019-08-07 RX ORDER — ONDANSETRON 4 MG/1
4 TABLET, FILM COATED ORAL EVERY 6 HOURS PRN
Status: DISCONTINUED | OUTPATIENT
Start: 2019-08-07 | End: 2019-08-08 | Stop reason: HOSPADM

## 2019-08-07 RX ORDER — LORAZEPAM 2 MG/ML
2 INJECTION INTRAMUSCULAR
Status: DISCONTINUED | OUTPATIENT
Start: 2019-08-07 | End: 2019-08-08 | Stop reason: HOSPADM

## 2019-08-07 RX ORDER — POTASSIUM CHLORIDE 1.5 G/1.77G
40 POWDER, FOR SOLUTION ORAL AS NEEDED
Status: DISCONTINUED | OUTPATIENT
Start: 2019-08-07 | End: 2019-08-08 | Stop reason: HOSPADM

## 2019-08-07 RX ORDER — ZIPRASIDONE MESYLATE 20 MG/ML
20 INJECTION, POWDER, LYOPHILIZED, FOR SOLUTION INTRAMUSCULAR ONCE
Status: COMPLETED | OUTPATIENT
Start: 2019-08-07 | End: 2019-08-07

## 2019-08-07 RX ORDER — MAGNESIUM SULFATE HEPTAHYDRATE 40 MG/ML
2 INJECTION, SOLUTION INTRAVENOUS AS NEEDED
Status: DISCONTINUED | OUTPATIENT
Start: 2019-08-07 | End: 2019-08-08 | Stop reason: HOSPADM

## 2019-08-07 RX ORDER — LORAZEPAM 1 MG/1
1 TABLET ORAL
Status: DISCONTINUED | OUTPATIENT
Start: 2019-08-07 | End: 2019-08-08 | Stop reason: HOSPADM

## 2019-08-07 RX ORDER — ONDANSETRON 2 MG/ML
4 INJECTION INTRAMUSCULAR; INTRAVENOUS EVERY 6 HOURS PRN
Status: DISCONTINUED | OUTPATIENT
Start: 2019-08-07 | End: 2019-08-08 | Stop reason: HOSPADM

## 2019-08-07 RX ORDER — SODIUM CHLORIDE 0.9 % (FLUSH) 0.9 %
3-10 SYRINGE (ML) INJECTION AS NEEDED
Status: DISCONTINUED | OUTPATIENT
Start: 2019-08-07 | End: 2019-08-08 | Stop reason: HOSPADM

## 2019-08-07 RX ORDER — LEVETIRACETAM 500 MG/1
500 TABLET ORAL 2 TIMES DAILY
Status: DISCONTINUED | OUTPATIENT
Start: 2019-08-07 | End: 2019-08-08 | Stop reason: HOSPADM

## 2019-08-07 RX ORDER — SODIUM CHLORIDE 9 MG/ML
100 INJECTION, SOLUTION INTRAVENOUS CONTINUOUS
Status: DISCONTINUED | OUTPATIENT
Start: 2019-08-07 | End: 2019-08-08 | Stop reason: HOSPADM

## 2019-08-07 RX ORDER — SODIUM CHLORIDE 0.9 % (FLUSH) 0.9 %
3 SYRINGE (ML) INJECTION EVERY 12 HOURS SCHEDULED
Status: DISCONTINUED | OUTPATIENT
Start: 2019-08-07 | End: 2019-08-08 | Stop reason: HOSPADM

## 2019-08-07 RX ORDER — LORAZEPAM 2 MG/ML
1 INJECTION INTRAMUSCULAR
Status: DISCONTINUED | OUTPATIENT
Start: 2019-08-07 | End: 2019-08-08 | Stop reason: HOSPADM

## 2019-08-07 RX ORDER — MAGNESIUM SULFATE 1 G/100ML
1 INJECTION INTRAVENOUS AS NEEDED
Status: DISCONTINUED | OUTPATIENT
Start: 2019-08-07 | End: 2019-08-08 | Stop reason: HOSPADM

## 2019-08-07 RX ORDER — LORAZEPAM 2 MG/ML
1 INJECTION INTRAMUSCULAR ONCE
Status: COMPLETED | OUTPATIENT
Start: 2019-08-07 | End: 2019-08-07

## 2019-08-07 RX ORDER — POTASSIUM CHLORIDE 20 MEQ/1
40 TABLET, EXTENDED RELEASE ORAL AS NEEDED
Status: DISCONTINUED | OUTPATIENT
Start: 2019-08-07 | End: 2019-08-08 | Stop reason: HOSPADM

## 2019-08-07 RX ORDER — NICOTINE 21 MG/24HR
1 PATCH, TRANSDERMAL 24 HOURS TRANSDERMAL EVERY 24 HOURS
Status: DISCONTINUED | OUTPATIENT
Start: 2019-08-07 | End: 2019-08-08 | Stop reason: HOSPADM

## 2019-08-07 RX ORDER — LORAZEPAM 1 MG/1
2 TABLET ORAL
Status: DISCONTINUED | OUTPATIENT
Start: 2019-08-07 | End: 2019-08-08 | Stop reason: HOSPADM

## 2019-08-07 RX ORDER — LEVETIRACETAM 10 MG/ML
1000 INJECTION INTRAVASCULAR ONCE
Status: COMPLETED | OUTPATIENT
Start: 2019-08-07 | End: 2019-08-07

## 2019-08-07 RX ADMIN — OXCARBAZEPINE 900 MG: 150 TABLET, FILM COATED ORAL at 20:15

## 2019-08-07 RX ADMIN — SODIUM CHLORIDE 100 ML/HR: 900 INJECTION, SOLUTION INTRAVENOUS at 05:51

## 2019-08-07 RX ADMIN — OXCARBAZEPINE 900 MG: 150 TABLET, FILM COATED ORAL at 08:39

## 2019-08-07 RX ADMIN — SERTRALINE HYDROCHLORIDE 75 MG: 50 TABLET ORAL at 08:40

## 2019-08-07 RX ADMIN — LEVETIRACETAM 500 MG: 500 TABLET, FILM COATED ORAL at 20:15

## 2019-08-07 RX ADMIN — FOLIC ACID 1000 ML/HR: 5 INJECTION, SOLUTION INTRAMUSCULAR; INTRAVENOUS; SUBCUTANEOUS at 02:55

## 2019-08-07 RX ADMIN — LORAZEPAM 1 MG: 2 INJECTION, SOLUTION INTRAMUSCULAR; INTRAVENOUS at 01:49

## 2019-08-07 RX ADMIN — LEVETIRACETAM 500 MG: 500 TABLET, FILM COATED ORAL at 08:39

## 2019-08-07 RX ADMIN — LEVETIRACETAM 1000 MG: 10 INJECTION INTRAVENOUS at 03:40

## 2019-08-07 RX ADMIN — ZIPRASIDONE MESYLATE 20 MG: 20 INJECTION, POWDER, LYOPHILIZED, FOR SOLUTION INTRAMUSCULAR at 01:48

## 2019-08-07 RX ADMIN — Medication 3 ML: at 08:42

## 2019-08-07 NOTE — ED NOTES
"Pt is hostile towards staff, when asked what brought the pt in tonight pt states \"None of your business.\" When pt asked if he had thoughts of hurting himself and a plan pt states \"yeah so what, let me go, I'm not staying and you don't need to worry about it.\" Pt placed on suicide precautions and told that he was not allowed to leave. Security at bedside.      Randal Burdick RN  08/07/19 0301    "

## 2019-08-07 NOTE — ED NOTES
Patient became combative upon triage,  Cursing at the physician and ED staff.  Security brought to bedside, restraints placed on patient      Alexa Bassett RN  08/07/19 0148

## 2019-08-07 NOTE — ED PROVIDER NOTES
Subjective   49-year-old male who suffers from alcohol and substance abuse as well as a seizure disorder transferred by EMS for reported seizures.  However, patient having pseudoseizure at bedside.  Terminated when I asked him to stop and then began arguing with me that he did not abuse methamphetamine.  Patient verbalizes suicidal ideation without plan secondary to depression over recent death in the family.  Patient is uncooperative, verbally abuses staff, yells profanity.  Unfortunately, physical and chemical restraints were necessary for the patient's safety as well as that of staff.            Review of Systems   Unable to perform ROS: Psychiatric disorder       Past Medical History:   Diagnosis Date   • Depression    • Hypertension    • Migraine    • Pneumonia    • Seizures (CMS/HCC)        Allergies   Allergen Reactions   • Codeine Unknown (See Comments)     Pt is unsure       Past Surgical History:   Procedure Laterality Date   • HERNIA REPAIR         No family history on file.    Social History     Socioeconomic History   • Marital status: Single     Spouse name: Not on file   • Number of children: Not on file   • Years of education: Not on file   • Highest education level: Not on file   Tobacco Use   • Smoking status: Never Smoker   • Smokeless tobacco: Never Used   Substance and Sexual Activity   • Alcohol use: Yes     Alcohol/week: 3.6 oz     Types: 6 Cans of beer per week     Comment: on weekends   • Drug use: No   • Sexual activity: Yes           Objective   Physical Exam   Constitutional: He appears well-developed and well-nourished.   HENT:   Head: Normocephalic and atraumatic.   Eyes: Pupils are equal, round, and reactive to light.   Cardiovascular: Normal rate, regular rhythm, normal heart sounds and intact distal pulses.   Pulmonary/Chest: Effort normal and breath sounds normal.   Abdominal: Soft. Bowel sounds are normal. He exhibits no distension. There is no tenderness.   Musculoskeletal: Normal  range of motion. He exhibits no edema or deformity.   Neurological:   Moves all extremities, no cranial nerve deficits appreciated   Skin: Skin is warm and dry. Capillary refill takes less than 2 seconds.   Psychiatric:   As per HPI       Procedures           ED Course                  MDM  Number of Diagnoses or Management Options  Alcohol abuse:   Seizure disorder (CMS/HCC):   Suicidal ideation:   Diagnosis management comments: Results for orders placed or performed during the hospital encounter of 08/07/19  -Comprehensive Metabolic Panel       Result                      Value             Ref Range           Glucose                     89                65 - 99 mg/dL       BUN                         12                8 - 20 mg/dL        Creatinine                  1.10              0.70 - 1.20 *       Sodium                      131 (L)           136 - 144 mm*       Potassium                   3.3 (L)           3.6 - 5.1 mm*       Chloride                    99 (L)            101 - 111 mm*       CO2                         15.0 (L)          22.0 - 32.0 *       Calcium                     7.6 (L)           8.9 - 10.3 m*       Total Protein               6.2               6.1 - 7.9 g/*       Albumin                     3.60              3.50 - 4.80 *       ALT (SGPT)                  16 (L)            17 - 63 U/L         AST (SGOT)                  22                15 - 41 U/L         Alkaline Phosphatase        84                32 - 91 U/L         Total Bilirubin             0.4               0.3 - 1.2 mg*       eGFR Non  Amer       71                >60 mL/min/1*       Globulin                    2.6               2.5 - 3.8 gm*       A/G Ratio                   1.4               1.0 - 1.7 g/*       BUN/Creatinine Ratio        10.9              6.2 - 20.3          Anion Gap                   20.3 (H)          5.0 - 15.0 m*  -Urinalysis With Microscopic If Indicated (No Culture) - Urine, Catheter        Result                      Value             Ref Range           Color, UA                   Yellow            Yellow, Straw       Appearance, UA              Clear             Clear               pH, UA                      <=5.0             5.0 - 8.0           Specific [place], UA        1.008             1.005 - 1.030       Glucose, UA                 Negative          Negative            Ketones, UA                 Negative          Negative            Bilirubin, UA               Negative          Negative            Blood, UA                   Trace (A)         Negative            Protein, UA                 Negative          Negative            Leuk Esterase, UA           Negative          Negative            Nitrite, UA                 Negative          Negative            Urobilinogen, UA            0.2 E.U./dL       0.2 - 1.0 E.*  -CK       Result                      Value             Ref Range           Creatine Kinase             148               20 - 200 U/L   -Ethanol       Result                      Value             Ref Range           Ethanol %                   0.168 (H)         <=0.079 %      -Urine Drug Screen - Urine, Catheter       Result                      Value             Ref Range           Barbiturates Screen, U*     Negative          Negative            Benzodiazepine Screen,*     Positive (A)      Negative            Cocaine Screen, Urine       Negative          Negative            Opiate Screen               Negative          Negative            THC, Screen, Urine          Negative          Negative            Methadone Screen, Urine     Negative          Negative            Amphetamine Screen, Ur*     Negative          Negative            Creatinine, Urine           25.5              mg/dL               Phencyclidine (PCP), U*     Negative          Negative       -Acetaminophen Level       Result                      Value             Ref Range           Acetaminophen                <10.0 (L)         10.0 - 30.0 *  -Salicylate Level       Result                      Value             Ref Range           Salicylate                  <4.0              0.0 - 30.0 m*  -CBC Auto Differential       Result                      Value             Ref Range           WBC                         5.90              3.40 - 10.80*       RBC                         4.19              4.14 - 5.80 *       Hemoglobin                  12.9 (L)          13.0 - 17.7 *       Hematocrit                  37.6              37.5 - 51.0 %       MCV                         89.9              79.0 - 97.0 *       MCH                         30.7              26.6 - 33.0 *       MCHC                        34.2              31.5 - 35.7 *       RDW                         13.1              12.3 - 15.4 %       RDW-SD                      42.0              37.0 - 54.0 *       MPV                         6.9               6.0 - 12.0 fL       Platelets                   201               140 - 450 10*       Neutrophil %                60.5              42.7 - 76.0 %       Lymphocyte %                26.4              19.6 - 45.3 %       Monocyte %                  9.1               5.0 - 12.0 %        Eosinophil %                3.1               0.3 - 6.2 %         Basophil %                  0.9               0.0 - 1.5 %         Neutrophils, Absolute       3.60              1.70 - 7.00 *       Lymphocytes, Absolute       1.60              0.70 - 3.10 *       Monocytes, Absolute         0.50              0.10 - 0.90 *       Eosinophils, Absolute       0.20              0.00 - 0.40 *       Basophils, Absolute         0.10              0.00 - 0.20 *       nRBC                        0.1               0.0 - 0.2 /1*  -Urinalysis, Microscopic Only - Urine, Catheter       Result                      Value             Ref Range           RBC, UA                     None Seen         None Seen /H*       WBC, UA                     None  Seen         None Seen /H*       Bacteria, UA                None Seen         None Seen /H*       Squamous Epithelial Ce*     None Seen         None Seen, 0*       Hyaline Casts, UA           None Seen         None Seen /L*       Methodology                                                   Automated Microscopy  -Light Blue Top       Result                      Value             Ref Range           Extra Tube                                                    hold for add-on  -Gold Top - SST       Result                      Value             Ref Range           Extra Tube                                                    Hold for add-ons.  -Green Top (No Gel)       Result                      Value             Ref Range           Extra Tube                                                    Hold for add-ons.  Patient will have to be observed until clinically sober and then have a psychiatric evaluation if able to medically clear at that point in time.  Complex case given the patient does have legitimate seizures but tonight seizure in ED appeared to be pseudo-in nature.        Final diagnoses:   Suicidal ideation   Alcohol abuse   Seizure disorder (CMS/[facility])            Willie Christensen MD  08/07/19 0339       Willie Christensen MD  08/07/19 0419

## 2019-08-07 NOTE — PAYOR COMM NOTE
Clinical for case# [id number]    Admit clinical for observation admission 8/7/2019 through the ER.   Patient is on PCU     -----------    Product: LOC:Acute Adult  Subset: General Medical      (Symptom or finding within 24h)         (Excludes PO medications unless noted)          [X] Select Day, One:              [X] Episode Day 1, One:                  [X] OBSERVATION, >= One:                      [X] General, >= One:                          [X] Danger to self or others, Both:                              [X] Condition, >= One:                                  [ ] Finding, Both:                                      [X] Finding, Both:                                          [X] Agitation or escalating self-injurious behavior                                          [X] Support system unavailable or inadequate                                  [X] Suicidal or homicidal ideation                              [X] Intervention, >= One:                                  [X] Psychiatric crisis intervention or stabilization and observation every 15 min     Version: [employer] 2018.3  [employer] and [employer]  © 2018 [employer] and/or one of its subsidiaries.  All Rights Reserved.  CPT only © 2017 [employer].  All Rights Reserved.    -------------    AUTHORIZATION PENDING:   PLEASE FAX OR CALL DETERMINATION TO CONTACT BELOW:     THANK YOU,  QUYNH Colby, RN  Utilization Review  [facility]  Phone: [phone]  Fax: [phone]      Valdemar Vo (49 y.o. Male)     Date of Birth Social Security Number Address Home Phone MRN    1970  [id number] [facility] [address] [facility] [address]  [id number]    Rastafarian Marital Status          None Single       Admission Date Admission Type Admitting Provider Attending Provider Department, Room/Bed    8/7/19 Emergency Khai Silva MD Ellis, Rusty T, MD [facility], 2110/1    Discharge Date Discharge  "Disposition Discharge Destination                       Attending Provider:  Vincent Zuleta MD    Allergies:  Codeine    Isolation:  None   Infection:  None   Code Status:  CPR    Ht:  165.1 cm (65\")   Wt:  107 kg (235 lb 10.8 oz)    Admission Cmt:  None   Principal Problem:  Suicidal ideation [R45.851]                 Active Insurance as of 8/7/2019     Primary Coverage     Payor Plan Insurance Group Employer/Plan Group    [facility] MEDICAID      Payor Plan Address Payor Plan Phone Number Payor Plan Fax Number Effective Dates    [address] [address] [phone]  6/21/2019 - None Entered    [facility] [address]       Subscriber Name Subscriber Birth Date Member ID       ZIYAD AARON 1970 [id number]                 Emergency Contacts      (Rel.) Home Phone Work Phone Mobile Phone    SAMY,JUNE (Significant Other) -- -- [phone]               History & Physical      Ann-Marie-Ilsa Aguilar APRN at 8/7/2019  3:40 AM     Attestation signed by Vincent Zuleta MD at 8/7/2019 10:27 AM     I have seen and examined Mr. Aaron and verified practitioner's findings.  I agree with the diagnoses and plan as documented with the following additions:    Still quite lethargic after meds.  Keppra level?  Appears there is no add on specimen.  Anion gap metabolic acidosis etiology unclear so repeating BMP.  Could be decreased clearance of lactate through liver.  Urine ketones negative.  Will await Psych recommendations once they can meet w/him when he is more alert.                  [facility] HOSPITALIST     Bert Donis DO    CHIEF COMPLAINT:     SI    HISTORY OF PRESENT ILLNESS:    Mr. Aaron is a 49 year old obese  male with a past medical history significant for chronic depression, HTN, migraines, seizure disorder, ETOH abuse, tobacco abuse, and COPD.      He was initially brought in by EMS for possible seizures.  He received Versed in the ambulance and appeared to " "have more seizure activity upon arrival, but was not postictal and was responsive during his seizure activity.  He then apparently became hostile with staff and requested to be released stating he had a plan to kill himself.  He then received Geodon and Keppra.    Initial work up in the ER revealed potassium 3.3, CK total 148, UDS was positive for Benzos, and BAL was 0.16.       Past Medical History:   Diagnosis Date   • Alcohol abuse 7/29/2019   • Depression    • Head contusion 7/29/2019   • History of traumatic head injury 10/26/2018   • Hypertension    • Migraine    • Pneumonia    • Seizures (CMS/HCC)    • Suicidal ideation 8/7/2019     Past Surgical History:   Procedure Laterality Date   • HERNIA REPAIR       No family history on file.  Social History     Tobacco Use   • Smoking status: Never Smoker   • Smokeless tobacco: Never Used   Substance Use Topics   • Alcohol use: Yes     Alcohol/week: 3.6 oz     Types: 6 Cans of beer per week     Comment: on weekends   • Drug use: No       (Not in a hospital admission)  Allergies:  Codeine      There is no immunization history on file for this patient.        REVIEW OF SYSTEMS:     Review of Systems   Unable to perform ROS: patient unresponsive   All other systems reviewed and are negative.      Vital Signs  Temp:  [98.9 °F (37.2 °C)] 98.9 °F (37.2 °C)  Heart Rate:  [103-111] 106  Resp:  [26] 26  BP: ()/(60-79) 124/66    Flowsheet Rows      First Filed Value   Admission Height  165.1 cm (65\") Documented at 08/07/2019 0142   Admission Weight  104 kg (230 lb) Documented at 08/07/2019 0142           Physical Exam:    Physical Exam   Constitutional: He appears well-developed and well-nourished. No distress.   HENT:   Head: Normocephalic and atraumatic.   Eyes: EOM are normal. Pupils are equal, round, and reactive to light.   Neck: Normal range of motion.   Cardiovascular: Normal rate and regular rhythm.   Pulmonary/Chest: Breath sounds normal.   Abdominal: Soft. Bowel " sounds are normal.   Musculoskeletal: Normal range of motion. He exhibits no edema.   Neurological:   Sedated, responds to voice   Skin: Skin is warm and dry.        Results Review:    I reviewed the patient's new clinical results.  Lab Results (most recent)     Procedure Component Value Units Date/Time    [place] Draw [[id number]] Collected:  08/07/19 0200    Specimen:  Blood Updated:  08/07/19 0306    Narrative:       The following orders were created for panel order [place] Draw.  Procedure                               Abnormality         Status                     ---------                               -----------         ------                     Light Blue Top[[id number]]                                   Final result               Gold Top - SST[[id number]]                                   Final result               Green Top (No Gel)[[id number]]                               Final result                 Please view results for these tests on the individual orders.    Light Blue Top [[id number]] Collected:  08/07/19 0200    Specimen:  Blood Updated:  08/07/19 0306     Extra Tube hold for add-on     Comment: Auto resulted       Gold Top - SST [[id number]] Collected:  08/07/19 0200    Specimen:  Blood Updated:  08/07/19 0306     Extra Tube Hold for add-ons.     Comment: Auto resulted.       Green Top (No Gel) [[id number]] Collected:  08/07/19 0200    Specimen:  Blood Updated:  08/07/19 0306     Extra Tube Hold for add-ons.     Comment: Auto resulted.       Urine Drug Screen - Urine, Catheter [[id number]]  (Abnormal) Collected:  08/07/19 0214    Specimen:  Urine, Catheter Updated:  08/07/19 0257     Barbiturates Screen, Urine Negative     Benzodiazepine Screen, Urine Positive     Cocaine Screen, Urine Negative     Opiate Screen Negative     THC, Screen, Urine Negative     Methadone Screen, Urine Negative     Amphetamine Screen, Urine Negative     Creatinine, Urine 25.5 mg/dL      Phencyclidine (PCP), Urine Negative     Narrative:       All urine drugs of abuse requests without chain of custody are for medical screening purposes only.  False positives are possible.      Comprehensive Metabolic Panel [[id number]]  (Abnormal) Collected:  08/07/19 0158    Specimen:  Blood Updated:  08/07/19 0225     Glucose 89 mg/dL      BUN 12 mg/dL      Creatinine 1.10 mg/dL      Sodium 131 mmol/L      Potassium 3.3 mmol/L      Chloride 99 mmol/L      CO2 15.0 mmol/L      Calcium 7.6 mg/dL      Total Protein 6.2 g/dL      Albumin 3.60 g/dL      ALT (SGPT) 16 U/L      AST (SGOT) 22 U/L      Alkaline Phosphatase 84 U/L      Total Bilirubin 0.4 mg/dL      eGFR Non African Amer 71 mL/min/1.73      Globulin 2.6 gm/dL      A/G Ratio 1.4 g/dL      BUN/Creatinine Ratio 10.9     Anion Gap 20.3 mmol/L     Salicylate Level [[id number]]  (Normal) Collected:  08/07/19 0158    Specimen:  Blood Updated:  08/07/19 0225     Salicylate <4.0 mg/dL     Acetaminophen Level [[id number]]  (Abnormal) Collected:  08/07/19 0158    Specimen:  Blood Updated:  08/07/19 0225     Acetaminophen <10.0 mcg/mL     Narrative:       Acetaminophen Therapeutic Range  5-20 ug/mL      Hours after ingestion            Toxic Value    4 Hours                           150 ug/mL    8 Hours                            70 ug/mL   12 Hours                            40 ug/mL   16 Hours                            20 ug/mL    These values apply to a single ingestion only.     Urinalysis With Microscopic If Indicated (No Culture) - Urine, Catheter [[id number]]  (Abnormal) Collected:  08/07/19 0214    Specimen:  Urine, Catheter Updated:  08/07/19 0224     Color, UA Yellow     Appearance, UA Clear     pH, UA <=5.0     Specific Gravity, UA 1.008     Glucose, UA Negative     Ketones, UA Negative     Bilirubin, UA Negative     Blood, UA Trace     Protein, UA Negative     Leuk Esterase, UA Negative     Nitrite, UA Negative     Urobilinogen, UA 0.2 E.U./dL    Urinalysis, Microscopic Only - Urine, Catheter  [[id number]] Collected:  08/07/19 0214    Specimen:  Urine, Catheter Updated:  08/07/19 0224     RBC, UA None Seen /HPF      WBC, UA None Seen /HPF      Bacteria, UA None Seen /HPF      Squamous Epithelial Cells, UA None Seen /HPF      Hyaline Casts, UA None Seen /LPF      Methodology Automated Microscopy    CK [[id number]]  (Normal) Collected:  08/07/19 0158    Specimen:  Blood Updated:  08/07/19 0224     Creatine Kinase 148 U/L     Ethanol [[id number]]  (Abnormal) Collected:  08/07/19 0158    Specimen:  Blood Updated:  08/07/19 0224     Ethanol % 0.168 %     Narrative:       Plasma Ethanol Clinical Symptoms:    ETOH (%)               Clinical Symptom  .01-.05              No apparent influence  .03-.12              Euphoria, Diminished judgment and attention   .09-.25              Impaired comprehension, Muscle incoordination  .18-.30              Confusion, Staggered gait, Slurred speech  .25-.40              Markedly decreased response to stimuli, unable to stand or                        walk, vomitting, sleep or stupor  .35-.50              Comatose, Anesthesia, Subnormal body temperature      CBC & Differential [[id number]] Collected:  08/07/19 0157    Specimen:  Blood Updated:  08/07/19 0207    Narrative:       The following orders were created for panel order CBC & Differential.  Procedure                               Abnormality         Status                     ---------                               -----------         ------                     CBC Auto Differential[[id number]]        Abnormal            Final result                 Please view results for these tests on the individual orders.    CBC Auto Differential [[id number]]  (Abnormal) Collected:  08/07/19 0157    Specimen:  Blood Updated:  08/07/19 0207     WBC 5.90 10*3/mm3      RBC 4.19 10*6/mm3      Hemoglobin 12.9 g/dL      Hematocrit 37.6 %      MCV 89.9 fL      MCH 30.7 pg      MCHC 34.2 g/dL      RDW 13.1 %      RDW-SD 42.0 fl      MPV 6.9 fL       Platelets 201 10*3/mm3      Neutrophil % 60.5 %      Lymphocyte % 26.4 %      Monocyte % 9.1 %      Eosinophil % 3.1 %      Basophil % 0.9 %      Neutrophils, Absolute 3.60 10*3/mm3      Lymphocytes, Absolute 1.60 10*3/mm3      Monocytes, Absolute 0.50 10*3/mm3      Eosinophils, Absolute 0.20 10*3/mm3      Basophils, Absolute 0.10 10*3/mm3      nRBC 0.1 /100 WBC           Imaging Results (most recent)     None        not reviewed    ECG/EMG Results (most recent)     None        not reviewed              XR Chest 1 View  Narrative: DATE OF EXAM:  7/28/2019 11:15 PM     PROCEDURE:  XR CHEST 1 VW-     INDICATIONS:  cp     COMPARISON:  6/21/2019.     TECHNIQUE:   Single radiographic AP view of the chest was obtained.     FINDINGS:  There is pulmonary hypoinflation. Probably no acute infiltrate is seen.  No cardiac enlargement. No pneumothorax is identified.      Impression: No acute infiltrate is seen.     Electronically Signed By-Dr. Omid Iraheta MD On:7/29/2019 6:40 AM  This report was finalized on [id number] by Dr. Omid Iraheta MD.  CT Head Without Contrast  Narrative: EXAMINATION: CT HEAD WO CONTRAST, CT FACIAL BONES WO CONTRAST, CT CERVICAL SPINE WO CONTRAST, CT THORACIC SPINE WO CONTRAST    DATE: 7/28/2019 11:22 PM     HISTORY:  Patient Data: Male, 49 years of age.  Clinical Indication : Trauma      COMPARISON: None available.     TECHNIQUE: Thin section noncontrast axial images were obtained through the head. Coronal reformatted images were then created. Helical images were then acquired through the face and cervical spine. Sagittal and coronal reformatted images were then   created. CT dose lowering techniques including automated exposure control, adjustment for patient size, and/or use of iterative reconstruction were used.      FINDINGS:    HEAD CT:     Ventricles: Normal in size and configuration.     Parenchyma: Normal attenuation.  Normal gray-white differentiation is preserved.   No CT  evidence of confluent lobar cortical infarct.   No mass effect or midline shift.     Intracranial Hemorrhage: None.     Bones/Extracranial soft tissues: No depressed calvarial fracture.     Visualized Sinuses/Mastoids: Moderate maxillary and ethmoid sinus mucosal thickening.  No air fluid level. Mastoid air cells and middle ears are grossly clear.    MAXILLOFACIAL CT:    There is a fracture left upper second molar tooth. There is a defect in the inferolateral wall of the left maxillary sinus near the site of the fractured tooth; however, this is favored to be chronic and related to a dental abscess. Acute fracture lines   do not definitively involve the maxillary alveolar ridge.    The patient is status post ORIF of an orbital rim fracture on the left. Hardware appears intact and well seated.    The mandible has normal morphology and the temporomandibular joints are intact.      Moderate maxillary and ethmoid sinus mucosal thickening.      The soft tissues of the face have normal density without soft tissue stranding, hematoma or mass. There is no radiopaque foreign body.  Intraorbital soft tissues are grossly normal.         CERVICAL SPINE CT:    Vertebral column:    There is straightening of the normal cervical lordosis, which may be on the basis of patient positioning or muscle spasm. No significant spondylolisthesis. Vertebral body heights are normal. The craniocervical junction appears normal. Facet joint   alignment is normal on each side. No cervical spine fracture is identified.     C2-3: No significant spinal stenosis or neural foraminal narrowing.    C3-4: There is a broad based disc bulge.  There is no significant spinal stenosis.  There is mild neural foraminal narrowing from uncovertebral and facet hypertrophy bilaterally.    C4-5: No significant spinal stenosis or neural foraminal narrowing.    C5-6: There is osteophytic endplate ridging.  There is no significant spinal stenosis.  There is mild neural  foraminal narrowing from uncovertebral and facet hypertrophy bilaterally.    C6-7: There is osteophytic endplate ridging.  There is no significant spinal stenosis.  The neural foramina are patent.    C7-T1: No significant spinal stenosis or neural foraminal narrowing.    THORACIC SPINE CT:    Thoracic vertebral bodies are normally aligned. Vertebral body heights are normal. No fractures are identified. Thoracic facet joint alignment is normal bilaterally.     T1-2 through T11-12: No significant spinal stenosis or neural foraminal narrowing.  Impression:    CT Head :  1.  No acute intracranial process detected.    CT Cervical Spine:  1.  No evidence of acute fracture or traumatic malalignment.    CT Facial Bones:  1.  Fractured left upper second molar tooth.  2.  Lucency at the root of the fractured tooth and involving the inferior wall of the maxillary sinus, favored to be chronic and related to dental abscess.    CT thoracic spine:  1.  No evidence of acute fracture or traumatic malalignment.    Josr Carlson MD  Neuroradiologist    Thank you for this referral.  This exam was interpreted by a fellowship trained neuroradiologist.       SLOT:  68      Electronically signed by:  Josr Carlson    7/28/2019 10:38 PM  CT Facial Bones Without Contrast  Narrative: EXAMINATION: CT HEAD WO CONTRAST, CT FACIAL BONES WO CONTRAST, CT CERVICAL SPINE WO CONTRAST, CT THORACIC SPINE WO CONTRAST    DATE: 7/28/2019 11:22 PM     HISTORY:  Patient Data: Male, 49 years of age.  Clinical Indication : Trauma      COMPARISON: None available.     TECHNIQUE: Thin section noncontrast axial images were obtained through the head. Coronal reformatted images were then created. Helical images were then acquired through the face and cervical spine. Sagittal and coronal reformatted images were then   created. CT dose lowering techniques including automated exposure control, adjustment for patient size, and/or use of iterative reconstruction  were used.      FINDINGS:    HEAD CT:     Ventricles: Normal in size and configuration.     Parenchyma: Normal attenuation.  Normal gray-white differentiation is preserved.   No CT evidence of confluent lobar cortical infarct.   No mass effect or midline shift.     Intracranial Hemorrhage: None.     Bones/Extracranial soft tissues: No depressed calvarial fracture.     Visualized Sinuses/Mastoids: Moderate maxillary and ethmoid sinus mucosal thickening.  No air fluid level. Mastoid air cells and middle ears are grossly clear.    MAXILLOFACIAL CT:    There is a fracture left upper second molar tooth. There is a defect in the inferolateral wall of the left maxillary sinus near the site of the fractured tooth; however, this is favored to be chronic and related to a dental abscess. Acute fracture lines   do not definitively involve the maxillary alveolar ridge.    The patient is status post ORIF of an orbital rim fracture on the left. Hardware appears intact and well seated.    The mandible has normal morphology and the temporomandibular joints are intact.      Moderate maxillary and ethmoid sinus mucosal thickening.      The soft tissues of the face have normal density without soft tissue stranding, hematoma or mass. There is no radiopaque foreign body.  Intraorbital soft tissues are grossly normal.         CERVICAL SPINE CT:    Vertebral column:    There is straightening of the normal cervical lordosis, which may be on the basis of patient positioning or muscle spasm. No significant spondylolisthesis. Vertebral body heights are normal. The craniocervical junction appears normal. Facet joint   alignment is normal on each side. No cervical spine fracture is identified.     C2-3: No significant spinal stenosis or neural foraminal narrowing.    C3-4: There is a broad based disc bulge.  There is no significant spinal stenosis.  There is mild neural foraminal narrowing from uncovertebral and facet hypertrophy  bilaterally.    C4-5: No significant spinal stenosis or neural foraminal narrowing.    C5-6: There is osteophytic endplate ridging.  There is no significant spinal stenosis.  There is mild neural foraminal narrowing from uncovertebral and facet hypertrophy bilaterally.    C6-7: There is osteophytic endplate ridging.  There is no significant spinal stenosis.  The neural foramina are patent.    C7-T1: No significant spinal stenosis or neural foraminal narrowing.    THORACIC SPINE CT:    Thoracic vertebral bodies are normally aligned. Vertebral body heights are normal. No fractures are identified. Thoracic facet joint alignment is normal bilaterally.     T1-2 through T11-12: No significant spinal stenosis or neural foraminal narrowing.  Impression:    CT Head :  1.  No acute intracranial process detected.    CT Cervical Spine:  1.  No evidence of acute fracture or traumatic malalignment.    CT Facial Bones:  1.  Fractured left upper second molar tooth.  2.  Lucency at the root of the fractured tooth and involving the inferior wall of the maxillary sinus, favored to be chronic and related to dental abscess.    CT thoracic spine:  1.  No evidence of acute fracture or traumatic malalignment.    Josr Carlson MD  Neuroradiologist    Thank you for this referral.  This exam was interpreted by a fellowship trained neuroradiologist.       SLOT:  68      Electronically signed by:  Josr Carlson    7/28/2019 10:38 PM  CT Thoracic Spine Without Contrast  Narrative: EXAMINATION: CT HEAD WO CONTRAST, CT FACIAL BONES WO CONTRAST, CT CERVICAL SPINE WO CONTRAST, CT THORACIC SPINE WO CONTRAST    DATE: 7/28/2019 11:22 PM     HISTORY:  Patient Data: Male, 49 years of age.  Clinical Indication : Trauma      COMPARISON: None available.     TECHNIQUE: Thin section noncontrast axial images were obtained through the head. Coronal reformatted images were then created. Helical images were then acquired through the face and cervical spine.  Sagittal and coronal reformatted images were then   created. CT dose lowering techniques including automated exposure control, adjustment for patient size, and/or use of iterative reconstruction were used.      FINDINGS:    HEAD CT:     Ventricles: Normal in size and configuration.     Parenchyma: Normal attenuation.  Normal gray-white differentiation is preserved.   No CT evidence of confluent lobar cortical infarct.   No mass effect or midline shift.     Intracranial Hemorrhage: None.     Bones/Extracranial soft tissues: No depressed calvarial fracture.     Visualized Sinuses/Mastoids: Moderate maxillary and ethmoid sinus mucosal thickening.  No air fluid level. Mastoid air cells and middle ears are grossly clear.    MAXILLOFACIAL CT:    There is a fracture left upper second molar tooth. There is a defect in the inferolateral wall of the left maxillary sinus near the site of the fractured tooth; however, this is favored to be chronic and related to a dental abscess. Acute fracture lines   do not definitively involve the maxillary alveolar ridge.    The patient is status post ORIF of an orbital rim fracture on the left. Hardware appears intact and well seated.    The mandible has normal morphology and the temporomandibular joints are intact.      Moderate maxillary and ethmoid sinus mucosal thickening.      The soft tissues of the face have normal density without soft tissue stranding, hematoma or mass. There is no radiopaque foreign body.  Intraorbital soft tissues are grossly normal.         CERVICAL SPINE CT:    Vertebral column:    There is straightening of the normal cervical lordosis, which may be on the basis of patient positioning or muscle spasm. No significant spondylolisthesis. Vertebral body heights are normal. The craniocervical junction appears normal. Facet joint   alignment is normal on each side. No cervical spine fracture is identified.     C2-3: No significant spinal stenosis or neural foraminal  narrowing.    C3-4: There is a broad based disc bulge.  There is no significant spinal stenosis.  There is mild neural foraminal narrowing from uncovertebral and facet hypertrophy bilaterally.    C4-5: No significant spinal stenosis or neural foraminal narrowing.    C5-6: There is osteophytic endplate ridging.  There is no significant spinal stenosis.  There is mild neural foraminal narrowing from uncovertebral and facet hypertrophy bilaterally.    C6-7: There is osteophytic endplate ridging.  There is no significant spinal stenosis.  The neural foramina are patent.    C7-T1: No significant spinal stenosis or neural foraminal narrowing.    THORACIC SPINE CT:    Thoracic vertebral bodies are normally aligned. Vertebral body heights are normal. No fractures are identified. Thoracic facet joint alignment is normal bilaterally.     T1-2 through T11-12: No significant spinal stenosis or neural foraminal narrowing.  Impression:    CT Head :  1.  No acute intracranial process detected.    CT Cervical Spine:  1.  No evidence of acute fracture or traumatic malalignment.    CT Facial Bones:  1.  Fractured left upper second molar tooth.  2.  Lucency at the root of the fractured tooth and involving the inferior wall of the maxillary sinus, favored to be chronic and related to dental abscess.    CT thoracic spine:  1.  No evidence of acute fracture or traumatic malalignment.    Josr Carlson MD  Neuroradiologist    Thank you for this referral.  This exam was interpreted by a fellowship trained neuroradiologist.       SLOT:  68      Electronically signed by:  Josr Carlson    7/28/2019 10:38 PM  CT Cervical Spine Without Contrast  Narrative: EXAMINATION: CT HEAD WO CONTRAST, CT FACIAL BONES WO CONTRAST, CT CERVICAL SPINE WO CONTRAST, CT THORACIC SPINE WO CONTRAST    DATE: 7/28/2019 11:22 PM     HISTORY:  Patient Data: Male, 49 years of age.  Clinical Indication : Trauma      COMPARISON: None available.     TECHNIQUE: Thin  section noncontrast axial images were obtained through the head. Coronal reformatted images were then created. Helical images were then acquired through the face and cervical spine. Sagittal and coronal reformatted images were then   created. CT dose lowering techniques including automated exposure control, adjustment for patient size, and/or use of iterative reconstruction were used.      FINDINGS:    HEAD CT:     Ventricles: Normal in size and configuration.     Parenchyma: Normal attenuation.  Normal gray-white differentiation is preserved.   No CT evidence of confluent lobar cortical infarct.   No mass effect or midline shift.     Intracranial Hemorrhage: None.     Bones/Extracranial soft tissues: No depressed calvarial fracture.     Visualized Sinuses/Mastoids: Moderate maxillary and ethmoid sinus mucosal thickening.  No air fluid level. Mastoid air cells and middle ears are grossly clear.    MAXILLOFACIAL CT:    There is a fracture left upper second molar tooth. There is a defect in the inferolateral wall of the left maxillary sinus near the site of the fractured tooth; however, this is favored to be chronic and related to a dental abscess. Acute fracture lines   do not definitively involve the maxillary alveolar ridge.    The patient is status post ORIF of an orbital rim fracture on the left. Hardware appears intact and well seated.    The mandible has normal morphology and the temporomandibular joints are intact.      Moderate maxillary and ethmoid sinus mucosal thickening.      The soft tissues of the face have normal density without soft tissue stranding, hematoma or mass. There is no radiopaque foreign body.  Intraorbital soft tissues are grossly normal.         CERVICAL SPINE CT:    Vertebral column:    There is straightening of the normal cervical lordosis, which may be on the basis of patient positioning or muscle spasm. No significant spondylolisthesis. Vertebral body heights are normal. The  craniocervical junction appears normal. Facet joint   alignment is normal on each side. No cervical spine fracture is identified.     C2-3: No significant spinal stenosis or neural foraminal narrowing.    C3-4: There is a broad based disc bulge.  There is no significant spinal stenosis.  There is mild neural foraminal narrowing from uncovertebral and facet hypertrophy bilaterally.    C4-5: No significant spinal stenosis or neural foraminal narrowing.    C5-6: There is osteophytic endplate ridging.  There is no significant spinal stenosis.  There is mild neural foraminal narrowing from uncovertebral and facet hypertrophy bilaterally.    C6-7: There is osteophytic endplate ridging.  There is no significant spinal stenosis.  The neural foramina are patent.    C7-T1: No significant spinal stenosis or neural foraminal narrowing.    THORACIC SPINE CT:    Thoracic vertebral bodies are normally aligned. Vertebral body heights are normal. No fractures are identified. Thoracic facet joint alignment is normal bilaterally.     T1-2 through T11-12: No significant spinal stenosis or neural foraminal narrowing.  Impression:    CT Head :  1.  No acute intracranial process detected.    CT Cervical Spine:  1.  No evidence of acute fracture or traumatic malalignment.    CT Facial Bones:  1.  Fractured left upper second molar tooth.  2.  Lucency at the root of the fractured tooth and involving the inferior wall of the maxillary sinus, favored to be chronic and related to dental abscess.    CT thoracic spine:  1.  No evidence of acute fracture or traumatic malalignment.    Josr Carlson MD  Neuroradiologist    Thank you for this referral.  This exam was interpreted by a fellowship trained neuroradiologist.       SLOT:  68      Electronically signed by:  Josr Carlson    7/28/2019 10:38 PM      Assessment/Plan       Suicidal ideation      Plan    Acute ETOH intoxication  -Unsure of how much alcohol he consumes daily.  BAL 0.16.   Received banana bag in ER.   -CIWA protocol  -PRN Ativan  -NS at 100mL/hr     SI with chronic depression  -Continue Trileptal and Zoloft   -Suicide precautions  -Sitter at bedside  -Psych eval in the am    Seizure disorder  -Received Keppra in the ER  -PRN Ativan  -Seizure precautions  -Continue home Keppra dose    Hypokalemia  -Potassium 3.3, replace per protocol  -Follow up potassium and Mag    Tobacco abuse  -Cessation education when appropriate  -Nicotine patch     DVT Prophylaxis  -Bilateral SCD's     Disposition     Observation     Unable to discuss the findings and plan with the patient as he is sedated post Geodon and Keppra.     Ilsa Ann-Marie-Short, APRN  08/07/19  4:21 AM            Electronically signed by Vincent Zuleta MD at 8/7/2019 10:27 AM          Emergency Department Notes      Alexa Bassett RN at 8/7/2019  1:47 AM        Patient became combative upon triage,  Cursing at the physician and ED staff.  Security brought to bedside, restraints placed on patient      Alexa Bassett RN  08/07/19 0148      Electronically signed by Alexa Bassett RN at 8/7/2019  1:48 AM     Randal Burdick RN at 8/7/2019  1:48 AM        Verbal order given per Dr. Christensen for 4 hour violent restraints.      Randal Burdick, FLORES  08/07/19 0404      Electronically signed by Randal Burdick, RN at 8/7/2019  4:04 AM     Willie Christensen MD at 8/7/2019  1:48 AM          Subjective   49-year-old male who suffers from alcohol and substance abuse as well as a seizure disorder transferred by EMS for reported seizures.  However, patient having pseudoseizure at bedside.  Terminated when I asked him to stop and then began arguing with me that he did not abuse methamphetamine.  Patient verbalizes suicidal ideation without plan secondary to depression over recent death in the family.  Patient is uncooperative, verbally abuses staff, yells profanity.  Unfortunately, physical and chemical restraints were necessary for the patient's safety  as well as that of staff.            Review of Systems   Unable to perform ROS: Psychiatric disorder       Past Medical History:   Diagnosis Date   • Depression    • Hypertension    • Migraine    • Pneumonia    • Seizures (CMS/HCC)        Allergies   Allergen Reactions   • Codeine Unknown (See Comments)     Pt is unsure       Past Surgical History:   Procedure Laterality Date   • HERNIA REPAIR         No family history on file.    Social History     Socioeconomic History   • Marital status: Single     Spouse name: Not on file   • Number of children: Not on file   • Years of education: Not on file   • Highest education level: Not on file   Tobacco Use   • Smoking status: Never Smoker   • Smokeless tobacco: Never Used   Substance and Sexual Activity   • Alcohol use: Yes     Alcohol/week: 3.6 oz     Types: 6 Cans of beer per week     Comment: on weekends   • Drug use: No   • Sexual activity: Yes           Objective   Physical Exam   Constitutional: He appears well-developed and well-nourished.   HENT:   Head: Normocephalic and atraumatic.   Eyes: Pupils are equal, round, and reactive to light.   Cardiovascular: Normal rate, regular rhythm, normal heart sounds and intact distal pulses.   Pulmonary/Chest: Effort normal and breath sounds normal.   Abdominal: Soft. Bowel sounds are normal. He exhibits no distension. There is no tenderness.   Musculoskeletal: Normal range of motion. He exhibits no edema or deformity.   Neurological:   Moves all extremities, no cranial nerve deficits appreciated   Skin: Skin is warm and dry. Capillary refill takes less than 2 seconds.   Psychiatric:   As per HPI       Procedures          ED Course                  MDM  Number of Diagnoses or Management Options  Alcohol abuse:   Seizure disorder (CMS/HCC):   Suicidal ideation:   Diagnosis management comments: Results for orders placed or performed during the hospital encounter of 08/07/19  -Comprehensive Metabolic Panel       Result                       Value             Ref Range           Glucose                     89                65 - 99 mg/dL       BUN                         12                8 - 20 mg/dL        Creatinine                  1.10              0.70 - 1.20 *       Sodium                      131 (L)           136 - 144 mm*       Potassium                   3.3 (L)           3.6 - 5.1 mm*       Chloride                    99 (L)            101 - 111 mm*       CO2                         15.0 (L)          22.0 - 32.0 *       Calcium                     7.6 (L)           8.9 - 10.3 m*       Total Protein               6.2               6.1 - 7.9 g/*       Albumin                     3.60              3.50 - 4.80 *       ALT (SGPT)                  16 (L)            17 - 63 U/L         AST (SGOT)                  22                15 - 41 U/L         Alkaline Phosphatase        84                32 - 91 U/L         Total Bilirubin             0.4               0.3 - 1.2 mg*       eGFR Non  Amer       71                >60 mL/min/1*       Globulin                    2.6               2.5 - 3.8 gm*       A/G Ratio                   1.4               1.0 - 1.7 g/*       BUN/Creatinine Ratio        10.9              6.2 - 20.3          Anion Gap                   20.3 (H)          5.0 - 15.0 m*  -Urinalysis With Microscopic If Indicated (No Culture) - Urine, Catheter       Result                      Value             Ref Range           Color, UA                   Yellow            Yellow, Straw       Appearance, UA              Clear             Clear               pH, UA                      <=5.0             5.0 - 8.0           Specific [place], UA        1.008             1.005 - 1.030       Glucose, UA                 Negative          Negative            Ketones, UA                 Negative          Negative            Bilirubin, UA               Negative          Negative            Blood, UA                   Trace (A)          Negative            Protein, UA                 Negative          Negative            Leuk Esterase, UA           Negative          Negative            Nitrite, UA                 Negative          Negative            Urobilinogen, UA            0.2 E.U./dL       0.2 - 1.0 E.*  -CK       Result                      Value             Ref Range           Creatine Kinase             148               20 - 200 U/L   -Ethanol       Result                      Value             Ref Range           Ethanol %                   0.168 (H)         <=0.079 %      -Urine Drug Screen - Urine, Catheter       Result                      Value             Ref Range           Barbiturates Screen, U*     Negative          Negative            Benzodiazepine Screen,*     Positive (A)      Negative            Cocaine Screen, Urine       Negative          Negative            Opiate Screen               Negative          Negative            THC, Screen, Urine          Negative          Negative            Methadone Screen, Urine     Negative          Negative            Amphetamine Screen, Ur*     Negative          Negative            Creatinine, Urine           25.5              mg/dL               Phencyclidine (PCP), U*     Negative          Negative       -Acetaminophen Level       Result                      Value             Ref Range           Acetaminophen               <10.0 (L)         10.0 - 30.0 *  -Salicylate Level       Result                      Value             Ref Range           Salicylate                  <4.0              0.0 - 30.0 m*  -CBC Auto Differential       Result                      Value             Ref Range           WBC                         5.90              3.40 - 10.80*       RBC                         4.19              4.14 - 5.80 *       Hemoglobin                  12.9 (L)          13.0 - 17.7 *       Hematocrit                  37.6              37.5 - 51.0 %       MCV                          89.9              79.0 - 97.0 *       MCH                         30.7              26.6 - 33.0 *       MCHC                        34.2              31.5 - 35.7 *       RDW                         13.1              12.3 - 15.4 %       RDW-SD                      42.0              37.0 - 54.0 *       MPV                         6.9               6.0 - 12.0 fL       Platelets                   201               140 - 450 10*       Neutrophil %                60.5              42.7 - 76.0 %       Lymphocyte %                26.4              19.6 - 45.3 %       Monocyte %                  9.1               5.0 - 12.0 %        Eosinophil %                3.1               0.3 - 6.2 %         Basophil %                  0.9               0.0 - 1.5 %         Neutrophils, Absolute       3.60              1.70 - 7.00 *       Lymphocytes, Absolute       1.60              0.70 - 3.10 *       Monocytes, Absolute         0.50              0.10 - 0.90 *       Eosinophils, Absolute       0.20              0.00 - 0.40 *       Basophils, Absolute         0.10              0.00 - 0.20 *       nRBC                        0.1               0.0 - 0.2 /1*  -Urinalysis, Microscopic Only - Urine, Catheter       Result                      Value             Ref Range           RBC, UA                     None Seen         None Seen /H*       WBC, UA                     None Seen         None Seen /H*       Bacteria, UA                None Seen         None Seen /H*       Squamous Epithelial Ce*     None Seen         None Seen, 0*       Hyaline Casts, UA           None Seen         None Seen /L*       Methodology                                                   Automated Microscopy  -Light Blue Top       Result                      Value             Ref Range           Extra Tube                                                    hold for add-on  -Gold Top - SST       Result                      Value             Ref Range           Extra  "Tube                                                    Hold for add-ons.  -Green Top (No Gel)       Result                      Value             Ref Range           Extra Tube                                                    Hold for add-ons.  Patient will have to be observed until clinically sober and then have a psychiatric evaluation if able to medically clear at that point in time.  Complex case given the patient does have legitimate seizures but tonight seizure in ED appeared to be pseudo-in nature.        Final diagnoses:   Suicidal ideation   Alcohol abuse   Seizure disorder (CMS/HCC)            Willie Christensen MD  08/07/19 0339       Willie Christensen MD  08/07/19 0419      Electronically signed by Willie Christensen MD at 8/7/2019  4:19 AM     Randal Burdick, RN at 8/7/2019  2:04 AM        Pt is hostile towards staff, when asked what brought the pt in tonight pt states \"None of your business.\" When pt asked if he had thoughts of hurting himself and a plan pt states \"yeah so what, let me go, I'm not staying and you don't need to worry about it.\" Pt placed on suicide precautions and told that he was not allowed to leave. Security at bedside.      Randal Burdick, FLORES  08/07/19 0301      Electronically signed by Randal Burdick, RN at 8/7/2019  3:01 AM     Randal Burdick, RN at 8/7/2019  3:40 AM        Verbal order to discontinue restraints per Dr. Christensen.      Randal Burdick, RN  08/07/19 0404      Electronically signed by Randal Burdick, RN at 8/7/2019  4:04 AM       "

## 2019-08-07 NOTE — ED NOTES
Verbal order to discontinue restraints per Dr. Christensen.      Randal Burdick, RN  08/07/19 6924

## 2019-08-07 NOTE — ED NOTES
Verbal order given per Dr. Christensen for 4 hour violent restraints.      Randal Burdick, FLORES  08/07/19 0408

## 2019-08-07 NOTE — PLAN OF CARE
Problem: Patient Care Overview  Goal: Plan of Care Review   08/07/19 1408   OTHER   Outcome Summary Patient becoming more alert and oriented at this time. no s/s of seizures.

## 2019-08-07 NOTE — PROGRESS NOTES
Discharge Planning Assessment  [facility]     Patient Name: Valdemar Vo  MRN: [id number]  Today's Date: 8/7/2019    Admit Date: 8/7/2019    Discharge Needs Assessment     Row Name 08/07/19 1207       Discharge Needs Assessment    Concerns to be Addressed  financial/insurance    Concerns Comments  pt is [facility] [place]     Row Name 08/07/19 1202       Living Environment    Name(s) of Who Lives With Patient  s.o.    Current Living Arrangements  home/apartment/condo    Primary Care Provided by  self    Able to Return to Prior Arrangements  yes       Discharge Needs Assessment    Discharge Coordination/Progress  pt etoh on suicide precautions . psych to see         Discharge Plan     Row Name 08/07/19 1205       Plan    Plan  psych saw pt . recommending out pt treatment . pt in with etoh     Patient/Family in Agreement with Plan  yes        Destination      No service coordination in this encounter.      Durable Medical Equipment      No service coordination in this encounter.      Dialysis/Infusion      No service coordination in this encounter.      Home Medical Care      No service coordination in this encounter.      Therapy      No service coordination in this encounter.      Community Resources      No service coordination in this encounter.          Demographic Summary    No documentation.       Functional Status    No documentation.       Psychosocial    No documentation.       Abuse/Neglect    No documentation.       Legal    No documentation.       Substance Abuse    No documentation.       Patient Forms    No documentation.           Nereyda Sweeney RN

## 2019-08-07 NOTE — CONSULTS
"  Referring Provider: Ilsa Olivia  Reason for Consultation: depression , SI       Chief complaint \"I had SZ\"     Subjective .     History of present illness:  The patient is a 49 y.o. male who was admitted secondary to SZ .   past medical history significant for chronic depression, HTN, migraines, seizure disorder, ETOH abuse, tobacco abuse, and COPD.    Psychiatric consult was requested by NP  Ann-Marie Khan 2ry to depression with SI.  Apparently he  became hostile with staff in the ER in the postictal period and requested to be released stating he had a plan to kill himself.  He then received Geodon and Keppra.    Today the patient admitted to feeling depressed, the patient became worse last few months secondary to multiple stressors including health issues, he had problems at work because he had seizures.  The patient was feeling depressed, he denied feeling hopeless and helpless, the patient admitted to having thoughts about death in general but did not have any plan, did not have any intention to kill himself.  He admitted to drinking alcohol 2-3 times per week couple of beers to alleviate symptoms of depression, patient did not report symptoms of freddy or hypomania  The patient does not remember making any statements in the ER  Psychiatric history: The patient was treated for depression in the remote past, he does not remember medications he was on, history of suicide attempts  Collateral history was obtained from his fiancée, she confirmed the patient been depressed, frequent suicidal statements, history of attempts         Review of Systems   All systems were reviewed and negative except for:  Constitution:  positive for fatigue  Neurological: positive for  seizures  Behavioral/Psych: positive for  depression    History    Past Medical History:   Diagnosis Date   • Alcohol abuse 7/29/2019   • Depression    • Head contusion 7/29/2019   • History of traumatic head injury 10/26/2018   • Hypertension    • " "Migraine    • Pneumonia    • Seizures (CMS/HCC)    • Suicidal ideation 8/7/2019        No family history on file.     Social History     Tobacco Use   • Smoking status: Never Smoker   • Smokeless tobacco: Never Used   Substance Use Topics   • Alcohol use: Yes     Alcohol/week: 3.6 oz     Types: 6 Cans of beer per week     Comment: on weekends   • Drug use: No          Medications Prior to Admission   Medication Sig Dispense Refill Last Dose   • baclofen (LIORESAL) 10 MG tablet Take 10 mg by mouth 3 (Three) Times a Day.   Unknown at Unknown time   • levETIRAcetam (KEPPRA) 500 MG tablet Take 1 tablet by mouth 2 (Two) Times a Day. 60 tablet 0    • meloxicam (MOBIC) 7.5 MG tablet Take 7.5 mg by mouth 2 (Two) Times a Day As Needed.      • OXcarbazepine (TRILEPTAL) 300 MG tablet Take 3 tablets by mouth 2 (Two) Times a Day. 60 tablet 0    • sertraline (ZOLOFT) 25 MG tablet Take 75 mg by mouth Daily.           Scheduled Meds:    levETIRAcetam 500 mg Oral BID   nicotine 1 patch Transdermal Q24H   OXcarbazepine 900 mg Oral BID   sertraline 75 mg Oral Daily   sodium chloride 3 mL Intravenous Q12H        Continuous Infusions:    sodium chloride 100 mL/hr Last Rate: 100 mL/hr (08/07/19 1107)       PRN Meds:  LORazepam **OR** LORazepam **OR** LORazepam **OR** LORazepam **OR** LORazepam **OR** LORazepam  •  magnesium sulfate **OR** magnesium sulfate in D5W 1g/100mL (PREMIX)  •  ondansetron **OR** ondansetron  •  potassium chloride  •  potassium chloride  •  sodium chloride      Allergies:  Codeine      Objective     Vital Signs   /80   Pulse 102   Temp 98.6 °F (37 °C) (Oral)   Resp 18   Ht 165.1 cm (65\")   Wt 107 kg (235 lb 10.8 oz)   SpO2 96%   BMI 39.22 kg/m²     Physical Exam:     General Appearance:    In NAD    Head:    Normocephalic, without obvious abnormality, atraumatic   Eyes:            Lids and lashes normal, conjunctivae and sclerae normal, no   icterus, no pallor, corneas clear, PERRLA   Skin:   No " bleeding, bruising or rash          Neurologic:   Cranial nerves 2 - 12 grossly intact, sensation intact, DTR       present and equal bilaterally       Mental Status Exam:    Hygiene:   good  Cooperation:  Cooperative  Eye Contact:  Fair  Psychomotor Behavior:  Slow  Affect:  Blunted  Hopelessness: Denies  Speech:  Monotone  Thought Progress:  Linear  Thought Content:  Normal  Suicidal:  None  Homicidal:  None  Hallucinations:  None  Delusion:  None  Memory:  Deficits  Orientation:  Person, Place, Time and Situation  Reliability:  fair  Insight:  Fair  Judgement:  Fair  Impulse Control:  Impaired  Physical/Medical Issues:  Yes SZ     Medications and allergies reviewed     Lab Results   Component Value Date    GLUCOSE 94 08/07/2019    CALCIUM 7.6 (L) 08/07/2019     (L) 08/07/2019    K 3.4 (L) 08/07/2019    CO2 19.0 (L) 08/07/2019     08/07/2019    BUN 7 (L) 08/07/2019    CREATININE 0.80 08/07/2019    EGFRIFNONA 103 08/07/2019    BCR 8.8 08/07/2019    ANIONGAP 14.4 08/07/2019       Last Urine Toxicity     LAST URINE TOXICITY RESULTS Latest Ref Rng & Units 8/7/2019 7/29/2019    CREATININE UR mg/dL 25.5 44.9    AMPHETAMINES SCREEN, URINE Negative Negative Negative    BARBITURATES SCREEN Negative Negative Negative    BENZODIAZEPINE SCREEN, URINE Negative Positive(A) Positive(A)    COCAINE SCREEN, URINE Negative Negative Positive(A)    METHADONE SCREEN, URINE Negative Negative Negative          No results found for: PHENYTOIN, PHENOBARB, VALPROATE, CBMZ    Lab Results   Component Value Date     (L) 08/07/2019    BUN 7 (L) 08/07/2019    CREATININE 0.80 08/07/2019    TSH 2.420 06/06/2019    WBC 5.90 08/07/2019       Brief Urine Lab Results  (Last result in the past 365 days)      Color   Clarity   Blood   Leuk Est   Nitrite   Protein   CREAT   Urine HCG        08/07/19 0214             25.5       08/07/19 0214 Yellow Clear Trace Negative Negative Negative               Assessment/Plan       Suicidal  ideation    Major depressive disorder, recurrent episode, severe (CMS/HCC)       LABS:Initial work up in the ER revealed potassium 3.3, CK total 148, UDS was positive for Benzos, and BAL was 0.16.     Assessment: Major depressive disorder, severe recurrent  Alcohol abuse  Treatment Plan: The patient is depressed, was treated in the past but he was not taking any medications recently, the patient will benefit from intensive outpatient program at [facility], he will benefit from antidepressants and treatment of alcohol problems  The patient does not appear to be suicidal and can be discharged medically stable  Treatment Plan discussed with: Patient and Family    I discussed the patients findings and my recommendations with patient, family and nursing staff    I have reviewed and approved the behavioral health treatment plans and problem list. Yes  Thank you for the consult   Referring MD has access to consult report and progress notes in EMR     Kathy Olsen MD  08/07/19  3:25 PM

## 2019-08-07 NOTE — H&P
"[facility] HOSPITALIST     Bert Donis DO    CHIEF COMPLAINT:     SI    HISTORY OF PRESENT ILLNESS:    Mr. Vo is a 49 year old obese  male with a past medical history significant for chronic depression, HTN, migraines, seizure disorder, ETOH abuse, tobacco abuse, and COPD.      He was initially brought in by EMS for possible seizures.  He received Versed in the ambulance and appeared to have more seizure activity upon arrival, but was not postictal and was responsive during his seizure activity.  He then apparently became hostile with staff and requested to be released stating he had a plan to kill himself.  He then received Geodon and Keppra.    Initial work up in the ER revealed potassium 3.3, CK total 148, UDS was positive for Benzos, and BAL was 0.16.       Past Medical History:   Diagnosis Date   • Alcohol abuse 7/29/2019   • Depression    • Head contusion 7/29/2019   • History of traumatic head injury 10/26/2018   • Hypertension    • Migraine    • Pneumonia    • Seizures (CMS/HCC)    • Suicidal ideation 8/7/2019     Past Surgical History:   Procedure Laterality Date   • HERNIA REPAIR       No family history on file.  Social History     Tobacco Use   • Smoking status: Never Smoker   • Smokeless tobacco: Never Used   Substance Use Topics   • Alcohol use: Yes     Alcohol/week: 3.6 oz     Types: 6 Cans of beer per week     Comment: on weekends   • Drug use: No       (Not in a hospital admission)  Allergies:  Codeine      There is no immunization history on file for this patient.        REVIEW OF SYSTEMS:     Review of Systems   Unable to perform ROS: patient unresponsive   All other systems reviewed and are negative.      Vital Signs  Temp:  [98.9 °F (37.2 °C)] 98.9 °F (37.2 °C)  Heart Rate:  [103-111] 106  Resp:  [26] 26  BP: ()/(60-79) 124/66    Flowsheet Rows      First Filed Value   Admission Height  165.1 cm (65\") Documented at 08/07/2019 0142   Admission Weight "  104 kg (230 lb) Documented at 08/07/2019 0142           Physical Exam:    Physical Exam   Constitutional: He appears well-developed and well-nourished. No distress.   HENT:   Head: Normocephalic and atraumatic.   Eyes: EOM are normal. Pupils are equal, round, and reactive to light.   Neck: Normal range of motion.   Cardiovascular: Normal rate and regular rhythm.   Pulmonary/Chest: Breath sounds normal.   Abdominal: Soft. Bowel sounds are normal.   Musculoskeletal: Normal range of motion. He exhibits no edema.   Neurological:   Sedated, responds to voice   Skin: Skin is warm and dry.        Results Review:    I reviewed the patient's new clinical results.  Lab Results (most recent)     Procedure Component Value Units Date/Time    [place] Draw [[id number]] Collected:  08/07/19 0200    Specimen:  Blood Updated:  08/07/19 0306    Narrative:       The following orders were created for panel order [place] Draw.  Procedure                               Abnormality         Status                     ---------                               -----------         ------                     Light Blue Top[[id number]]                                   Final result               Gold Top - SST[[id number]]                                   Final result               Green Top (No Gel)[[id number]]                               Final result                 Please view results for these tests on the individual orders.    Light Blue Top [[id number]] Collected:  08/07/19 0200    Specimen:  Blood Updated:  08/07/19 0306     Extra Tube hold for add-on     Comment: Auto resulted       Gold Top - SST [[id number]] Collected:  08/07/19 0200    Specimen:  Blood Updated:  08/07/19 0306     Extra Tube Hold for add-ons.     Comment: Auto resulted.       Green Top (No Gel) [[id number]] Collected:  08/07/19 0200    Specimen:  Blood Updated:  08/07/19 0306     Extra Tube Hold for add-ons.     Comment: Auto resulted.       Urine Drug Screen - Urine,  Catheter [[id number]]  (Abnormal) Collected:  08/07/19 0214    Specimen:  Urine, Catheter Updated:  08/07/19 0257     Barbiturates Screen, Urine Negative     Benzodiazepine Screen, Urine Positive     Cocaine Screen, Urine Negative     Opiate Screen Negative     THC, Screen, Urine Negative     Methadone Screen, Urine Negative     Amphetamine Screen, Urine Negative     Creatinine, Urine 25.5 mg/dL      Phencyclidine (PCP), Urine Negative    Narrative:       All urine drugs of abuse requests without chain of custody are for medical screening purposes only.  False positives are possible.      Comprehensive Metabolic Panel [[id number]]  (Abnormal) Collected:  08/07/19 0158    Specimen:  Blood Updated:  08/07/19 0225     Glucose 89 mg/dL      BUN 12 mg/dL      Creatinine 1.10 mg/dL      Sodium 131 mmol/L      Potassium 3.3 mmol/L      Chloride 99 mmol/L      CO2 15.0 mmol/L      Calcium 7.6 mg/dL      Total Protein 6.2 g/dL      Albumin 3.60 g/dL      ALT (SGPT) 16 U/L      AST (SGOT) 22 U/L      Alkaline Phosphatase 84 U/L      Total Bilirubin 0.4 mg/dL      eGFR Non African Amer 71 mL/min/1.73      Globulin 2.6 gm/dL      A/G Ratio 1.4 g/dL      BUN/Creatinine Ratio 10.9     Anion Gap 20.3 mmol/L     Salicylate Level [[id number]]  (Normal) Collected:  08/07/19 0158    Specimen:  Blood Updated:  08/07/19 0225     Salicylate <4.0 mg/dL     Acetaminophen Level [[id number]]  (Abnormal) Collected:  08/07/19 0158    Specimen:  Blood Updated:  08/07/19 0225     Acetaminophen <10.0 mcg/mL     Narrative:       Acetaminophen Therapeutic Range  5-20 ug/mL      Hours after ingestion            Toxic Value    4 Hours                           150 ug/mL    8 Hours                            70 ug/mL   12 Hours                            40 ug/mL   16 Hours                            20 ug/mL    These values apply to a single ingestion only.     Urinalysis With Microscopic If Indicated (No Culture) - Urine, Catheter [[id number]]   (Abnormal) Collected:  08/07/19 0214    Specimen:  Urine, Catheter Updated:  08/07/19 0224     Color, UA Yellow     Appearance, UA Clear     pH, UA <=5.0     Specific Gravity, UA 1.008     Glucose, UA Negative     Ketones, UA Negative     Bilirubin, UA Negative     Blood, UA Trace     Protein, UA Negative     Leuk Esterase, UA Negative     Nitrite, UA Negative     Urobilinogen, UA 0.2 E.U./dL    Urinalysis, Microscopic Only - Urine, Catheter [[id number]] Collected:  08/07/19 0214    Specimen:  Urine, Catheter Updated:  08/07/19 0224     RBC, UA None Seen /HPF      WBC, UA None Seen /HPF      Bacteria, UA None Seen /HPF      Squamous Epithelial Cells, UA None Seen /HPF      Hyaline Casts, UA None Seen /LPF      Methodology Automated Microscopy    CK [[id number]]  (Normal) Collected:  08/07/19 0158    Specimen:  Blood Updated:  08/07/19 0224     Creatine Kinase 148 U/L     Ethanol [[id number]]  (Abnormal) Collected:  08/07/19 0158    Specimen:  Blood Updated:  08/07/19 0224     Ethanol % 0.168 %     Narrative:       Plasma Ethanol Clinical Symptoms:    ETOH (%)               Clinical Symptom  .01-.05              No apparent influence  .03-.12              Euphoria, Diminished judgment and attention   .09-.25              Impaired comprehension, Muscle incoordination  .18-.30              Confusion, Staggered gait, Slurred speech  .25-.40              Markedly decreased response to stimuli, unable to stand or                        walk, vomitting, sleep or stupor  .35-.50              Comatose, Anesthesia, Subnormal body temperature      CBC & Differential [[id number]] Collected:  08/07/19 0157    Specimen:  Blood Updated:  08/07/19 0207    Narrative:       The following orders were created for panel order CBC & Differential.  Procedure                               Abnormality         Status                     ---------                               -----------         ------                     CBC Auto  Differential[[id number]]        Abnormal            Final result                 Please view results for these tests on the individual orders.    CBC Auto Differential [[id number]]  (Abnormal) Collected:  08/07/19 0157    Specimen:  Blood Updated:  08/07/19 0207     WBC 5.90 10*3/mm3      RBC 4.19 10*6/mm3      Hemoglobin 12.9 g/dL      Hematocrit 37.6 %      MCV 89.9 fL      MCH 30.7 pg      MCHC 34.2 g/dL      RDW 13.1 %      RDW-SD 42.0 fl      MPV 6.9 fL      Platelets 201 10*3/mm3      Neutrophil % 60.5 %      Lymphocyte % 26.4 %      Monocyte % 9.1 %      Eosinophil % 3.1 %      Basophil % 0.9 %      Neutrophils, Absolute 3.60 10*3/mm3      Lymphocytes, Absolute 1.60 10*3/mm3      Monocytes, Absolute 0.50 10*3/mm3      Eosinophils, Absolute 0.20 10*3/mm3      Basophils, Absolute 0.10 10*3/mm3      nRBC 0.1 /100 WBC           Imaging Results (most recent)     None        not reviewed    ECG/EMG Results (most recent)     None        not reviewed              XR Chest 1 View  Narrative: DATE OF EXAM:  7/28/2019 11:15 PM     PROCEDURE:  XR CHEST 1 VW-     INDICATIONS:  cp     COMPARISON:  6/21/2019.     TECHNIQUE:   Single radiographic AP view of the chest was obtained.     FINDINGS:  There is pulmonary hypoinflation. Probably no acute infiltrate is seen.  No cardiac enlargement. No pneumothorax is identified.      Impression: No acute infiltrate is seen.     Electronically Signed By-Dr. Omid Iraheta MD On:7/29/2019 6:40 AM  This report was finalized on [id number] by Dr. Omid Iraheta MD.  CT Head Without Contrast  Narrative: EXAMINATION: CT HEAD WO CONTRAST, CT FACIAL BONES WO CONTRAST, CT CERVICAL SPINE WO CONTRAST, CT THORACIC SPINE WO CONTRAST    DATE: 7/28/2019 11:22 PM     HISTORY:  Patient Data: Male, 49 years of age.  Clinical Indication : Trauma      COMPARISON: None available.     TECHNIQUE: Thin section noncontrast axial images were obtained through the head. Coronal reformatted images were then  created. Helical images were then acquired through the face and cervical spine. Sagittal and coronal reformatted images were then   created. CT dose lowering techniques including automated exposure control, adjustment for patient size, and/or use of iterative reconstruction were used.      FINDINGS:    HEAD CT:     Ventricles: Normal in size and configuration.     Parenchyma: Normal attenuation.  Normal gray-white differentiation is preserved.   No CT evidence of confluent lobar cortical infarct.   No mass effect or midline shift.     Intracranial Hemorrhage: None.     Bones/Extracranial soft tissues: No depressed calvarial fracture.     Visualized Sinuses/Mastoids: Moderate maxillary and ethmoid sinus mucosal thickening.  No air fluid level. Mastoid air cells and middle ears are grossly clear.    MAXILLOFACIAL CT:    There is a fracture left upper second molar tooth. There is a defect in the inferolateral wall of the left maxillary sinus near the site of the fractured tooth; however, this is favored to be chronic and related to a dental abscess. Acute fracture lines   do not definitively involve the maxillary alveolar ridge.    The patient is status post ORIF of an orbital rim fracture on the left. Hardware appears intact and well seated.    The mandible has normal morphology and the temporomandibular joints are intact.      Moderate maxillary and ethmoid sinus mucosal thickening.      The soft tissues of the face have normal density without soft tissue stranding, hematoma or mass. There is no radiopaque foreign body.  Intraorbital soft tissues are grossly normal.         CERVICAL SPINE CT:    Vertebral column:    There is straightening of the normal cervical lordosis, which may be on the basis of patient positioning or muscle spasm. No significant spondylolisthesis. Vertebral body heights are normal. The craniocervical junction appears normal. Facet joint   alignment is normal on each side. No cervical spine  fracture is identified.     C2-3: No significant spinal stenosis or neural foraminal narrowing.    C3-4: There is a broad based disc bulge.  There is no significant spinal stenosis.  There is mild neural foraminal narrowing from uncovertebral and facet hypertrophy bilaterally.    C4-5: No significant spinal stenosis or neural foraminal narrowing.    C5-6: There is osteophytic endplate ridging.  There is no significant spinal stenosis.  There is mild neural foraminal narrowing from uncovertebral and facet hypertrophy bilaterally.    C6-7: There is osteophytic endplate ridging.  There is no significant spinal stenosis.  The neural foramina are patent.    C7-T1: No significant spinal stenosis or neural foraminal narrowing.    THORACIC SPINE CT:    Thoracic vertebral bodies are normally aligned. Vertebral body heights are normal. No fractures are identified. Thoracic facet joint alignment is normal bilaterally.     T1-2 through T11-12: No significant spinal stenosis or neural foraminal narrowing.  Impression:    CT Head :  1.  No acute intracranial process detected.    CT Cervical Spine:  1.  No evidence of acute fracture or traumatic malalignment.    CT Facial Bones:  1.  Fractured left upper second molar tooth.  2.  Lucency at the root of the fractured tooth and involving the inferior wall of the maxillary sinus, favored to be chronic and related to dental abscess.    CT thoracic spine:  1.  No evidence of acute fracture or traumatic malalignment.    Josr Carlson MD  Neuroradiologist    Thank you for this referral.  This exam was interpreted by a fellowship trained neuroradiologist.       SLOT:  68      Electronically signed by:  Josr Carlson    7/28/2019 10:38 PM  CT Facial Bones Without Contrast  Narrative: EXAMINATION: CT HEAD WO CONTRAST, CT FACIAL BONES WO CONTRAST, CT CERVICAL SPINE WO CONTRAST, CT THORACIC SPINE WO CONTRAST    DATE: 7/28/2019 11:22 PM     HISTORY:  Patient Data: Male, 49 years of  age.  Clinical Indication : Trauma      COMPARISON: None available.     TECHNIQUE: Thin section noncontrast axial images were obtained through the head. Coronal reformatted images were then created. Helical images were then acquired through the face and cervical spine. Sagittal and coronal reformatted images were then   created. CT dose lowering techniques including automated exposure control, adjustment for patient size, and/or use of iterative reconstruction were used.      FINDINGS:    HEAD CT:     Ventricles: Normal in size and configuration.     Parenchyma: Normal attenuation.  Normal gray-white differentiation is preserved.   No CT evidence of confluent lobar cortical infarct.   No mass effect or midline shift.     Intracranial Hemorrhage: None.     Bones/Extracranial soft tissues: No depressed calvarial fracture.     Visualized Sinuses/Mastoids: Moderate maxillary and ethmoid sinus mucosal thickening.  No air fluid level. Mastoid air cells and middle ears are grossly clear.    MAXILLOFACIAL CT:    There is a fracture left upper second molar tooth. There is a defect in the inferolateral wall of the left maxillary sinus near the site of the fractured tooth; however, this is favored to be chronic and related to a dental abscess. Acute fracture lines   do not definitively involve the maxillary alveolar ridge.    The patient is status post ORIF of an orbital rim fracture on the left. Hardware appears intact and well seated.    The mandible has normal morphology and the temporomandibular joints are intact.      Moderate maxillary and ethmoid sinus mucosal thickening.      The soft tissues of the face have normal density without soft tissue stranding, hematoma or mass. There is no radiopaque foreign body.  Intraorbital soft tissues are grossly normal.         CERVICAL SPINE CT:    Vertebral column:    There is straightening of the normal cervical lordosis, which may be on the basis of patient positioning or muscle  spasm. No significant spondylolisthesis. Vertebral body heights are normal. The craniocervical junction appears normal. Facet joint   alignment is normal on each side. No cervical spine fracture is identified.     C2-3: No significant spinal stenosis or neural foraminal narrowing.    C3-4: There is a broad based disc bulge.  There is no significant spinal stenosis.  There is mild neural foraminal narrowing from uncovertebral and facet hypertrophy bilaterally.    C4-5: No significant spinal stenosis or neural foraminal narrowing.    C5-6: There is osteophytic endplate ridging.  There is no significant spinal stenosis.  There is mild neural foraminal narrowing from uncovertebral and facet hypertrophy bilaterally.    C6-7: There is osteophytic endplate ridging.  There is no significant spinal stenosis.  The neural foramina are patent.    C7-T1: No significant spinal stenosis or neural foraminal narrowing.    THORACIC SPINE CT:    Thoracic vertebral bodies are normally aligned. Vertebral body heights are normal. No fractures are identified. Thoracic facet joint alignment is normal bilaterally.     T1-2 through T11-12: No significant spinal stenosis or neural foraminal narrowing.  Impression:    CT Head :  1.  No acute intracranial process detected.    CT Cervical Spine:  1.  No evidence of acute fracture or traumatic malalignment.    CT Facial Bones:  1.  Fractured left upper second molar tooth.  2.  Lucency at the root of the fractured tooth and involving the inferior wall of the maxillary sinus, favored to be chronic and related to dental abscess.    CT thoracic spine:  1.  No evidence of acute fracture or traumatic malalignment.    Josr Carlson MD  Neuroradiologist    Thank you for this referral.  This exam was interpreted by a fellowship trained neuroradiologist.       SLOT:  68      Electronically signed by:  Josr Carlson    7/28/2019 10:38 PM  CT Thoracic Spine Without Contrast  Narrative: EXAMINATION: CT  HEAD WO CONTRAST, CT FACIAL BONES WO CONTRAST, CT CERVICAL SPINE WO CONTRAST, CT THORACIC SPINE WO CONTRAST    DATE: 7/28/2019 11:22 PM     HISTORY:  Patient Data: Male, 49 years of age.  Clinical Indication : Trauma      COMPARISON: None available.     TECHNIQUE: Thin section noncontrast axial images were obtained through the head. Coronal reformatted images were then created. Helical images were then acquired through the face and cervical spine. Sagittal and coronal reformatted images were then   created. CT dose lowering techniques including automated exposure control, adjustment for patient size, and/or use of iterative reconstruction were used.      FINDINGS:    HEAD CT:     Ventricles: Normal in size and configuration.     Parenchyma: Normal attenuation.  Normal gray-white differentiation is preserved.   No CT evidence of confluent lobar cortical infarct.   No mass effect or midline shift.     Intracranial Hemorrhage: None.     Bones/Extracranial soft tissues: No depressed calvarial fracture.     Visualized Sinuses/Mastoids: Moderate maxillary and ethmoid sinus mucosal thickening.  No air fluid level. Mastoid air cells and middle ears are grossly clear.    MAXILLOFACIAL CT:    There is a fracture left upper second molar tooth. There is a defect in the inferolateral wall of the left maxillary sinus near the site of the fractured tooth; however, this is favored to be chronic and related to a dental abscess. Acute fracture lines   do not definitively involve the maxillary alveolar ridge.    The patient is status post ORIF of an orbital rim fracture on the left. Hardware appears intact and well seated.    The mandible has normal morphology and the temporomandibular joints are intact.      Moderate maxillary and ethmoid sinus mucosal thickening.      The soft tissues of the face have normal density without soft tissue stranding, hematoma or mass. There is no radiopaque foreign body.  Intraorbital soft tissues are  grossly normal.         CERVICAL SPINE CT:    Vertebral column:    There is straightening of the normal cervical lordosis, which may be on the basis of patient positioning or muscle spasm. No significant spondylolisthesis. Vertebral body heights are normal. The craniocervical junction appears normal. Facet joint   alignment is normal on each side. No cervical spine fracture is identified.     C2-3: No significant spinal stenosis or neural foraminal narrowing.    C3-4: There is a broad based disc bulge.  There is no significant spinal stenosis.  There is mild neural foraminal narrowing from uncovertebral and facet hypertrophy bilaterally.    C4-5: No significant spinal stenosis or neural foraminal narrowing.    C5-6: There is osteophytic endplate ridging.  There is no significant spinal stenosis.  There is mild neural foraminal narrowing from uncovertebral and facet hypertrophy bilaterally.    C6-7: There is osteophytic endplate ridging.  There is no significant spinal stenosis.  The neural foramina are patent.    C7-T1: No significant spinal stenosis or neural foraminal narrowing.    THORACIC SPINE CT:    Thoracic vertebral bodies are normally aligned. Vertebral body heights are normal. No fractures are identified. Thoracic facet joint alignment is normal bilaterally.     T1-2 through T11-12: No significant spinal stenosis or neural foraminal narrowing.  Impression:    CT Head :  1.  No acute intracranial process detected.    CT Cervical Spine:  1.  No evidence of acute fracture or traumatic malalignment.    CT Facial Bones:  1.  Fractured left upper second molar tooth.  2.  Lucency at the root of the fractured tooth and involving the inferior wall of the maxillary sinus, favored to be chronic and related to dental abscess.    CT thoracic spine:  1.  No evidence of acute fracture or traumatic malalignment.    Josr Carlson MD  Neuroradiologist    Thank you for this referral.  This exam was interpreted by a  fellowship trained neuroradiologist.       SLOT:  68      Electronically signed by:  Josr Carlson    7/28/2019 10:38 PM  CT Cervical Spine Without Contrast  Narrative: EXAMINATION: CT HEAD WO CONTRAST, CT FACIAL BONES WO CONTRAST, CT CERVICAL SPINE WO CONTRAST, CT THORACIC SPINE WO CONTRAST    DATE: 7/28/2019 11:22 PM     HISTORY:  Patient Data: Male, 49 years of age.  Clinical Indication : Trauma      COMPARISON: None available.     TECHNIQUE: Thin section noncontrast axial images were obtained through the head. Coronal reformatted images were then created. Helical images were then acquired through the face and cervical spine. Sagittal and coronal reformatted images were then   created. CT dose lowering techniques including automated exposure control, adjustment for patient size, and/or use of iterative reconstruction were used.      FINDINGS:    HEAD CT:     Ventricles: Normal in size and configuration.     Parenchyma: Normal attenuation.  Normal gray-white differentiation is preserved.   No CT evidence of confluent lobar cortical infarct.   No mass effect or midline shift.     Intracranial Hemorrhage: None.     Bones/Extracranial soft tissues: No depressed calvarial fracture.     Visualized Sinuses/Mastoids: Moderate maxillary and ethmoid sinus mucosal thickening.  No air fluid level. Mastoid air cells and middle ears are grossly clear.    MAXILLOFACIAL CT:    There is a fracture left upper second molar tooth. There is a defect in the inferolateral wall of the left maxillary sinus near the site of the fractured tooth; however, this is favored to be chronic and related to a dental abscess. Acute fracture lines   do not definitively involve the maxillary alveolar ridge.    The patient is status post ORIF of an orbital rim fracture on the left. Hardware appears intact and well seated.    The mandible has normal morphology and the temporomandibular joints are intact.      Moderate maxillary and ethmoid sinus  mucosal thickening.      The soft tissues of the face have normal density without soft tissue stranding, hematoma or mass. There is no radiopaque foreign body.  Intraorbital soft tissues are grossly normal.         CERVICAL SPINE CT:    Vertebral column:    There is straightening of the normal cervical lordosis, which may be on the basis of patient positioning or muscle spasm. No significant spondylolisthesis. Vertebral body heights are normal. The craniocervical junction appears normal. Facet joint   alignment is normal on each side. No cervical spine fracture is identified.     C2-3: No significant spinal stenosis or neural foraminal narrowing.    C3-4: There is a broad based disc bulge.  There is no significant spinal stenosis.  There is mild neural foraminal narrowing from uncovertebral and facet hypertrophy bilaterally.    C4-5: No significant spinal stenosis or neural foraminal narrowing.    C5-6: There is osteophytic endplate ridging.  There is no significant spinal stenosis.  There is mild neural foraminal narrowing from uncovertebral and facet hypertrophy bilaterally.    C6-7: There is osteophytic endplate ridging.  There is no significant spinal stenosis.  The neural foramina are patent.    C7-T1: No significant spinal stenosis or neural foraminal narrowing.    THORACIC SPINE CT:    Thoracic vertebral bodies are normally aligned. Vertebral body heights are normal. No fractures are identified. Thoracic facet joint alignment is normal bilaterally.     T1-2 through T11-12: No significant spinal stenosis or neural foraminal narrowing.  Impression:    CT Head :  1.  No acute intracranial process detected.    CT Cervical Spine:  1.  No evidence of acute fracture or traumatic malalignment.    CT Facial Bones:  1.  Fractured left upper second molar tooth.  2.  Lucency at the root of the fractured tooth and involving the inferior wall of the maxillary sinus, favored to be chronic and related to dental  abscess.    CT thoracic spine:  1.  No evidence of acute fracture or traumatic malalignment.    Josr Carlson MD  Neuroradiologist    Thank you for this referral.  This exam was interpreted by a fellowship trained neuroradiologist.       SLOT:  68      Electronically signed by:  Josr Carlson    7/28/2019 10:38 PM      Assessment/Plan       Suicidal ideation      Plan    Acute ETOH intoxication  -Unsure of how much alcohol he consumes daily.  BAL 0.16.  Received banana bag in ER.   -CIWA protocol  -PRN Ativan  -NS at 100mL/hr     SI with chronic depression  -Continue Trileptal and Zoloft   -Suicide precautions  -Sitter at bedside  -Psych eval in the am    Seizure disorder  -Received Keppra in the ER  -PRN Ativan  -Seizure precautions  -Continue home Keppra dose    Hypokalemia  -Potassium 3.3, replace per protocol  -Follow up potassium and Mag    Tobacco abuse  -Cessation education when appropriate  -Nicotine patch     DVT Prophylaxis  -Bilateral SCD's     Disposition     Observation     Unable to discuss the findings and plan with the patient as he is sedated post Geodon and Keppra.     Ilsa Jacobsen-Jeff, ALISON  08/07/19  4:21 AM

## 2019-08-08 VITALS
BODY MASS INDEX: 38.93 KG/M2 | RESPIRATION RATE: 20 BRPM | DIASTOLIC BLOOD PRESSURE: 92 MMHG | HEART RATE: 74 BPM | HEIGHT: 65 IN | WEIGHT: 233.69 LBS | TEMPERATURE: 98.7 F | OXYGEN SATURATION: 98 % | SYSTOLIC BLOOD PRESSURE: 145 MMHG

## 2019-08-08 LAB
ANION GAP SERPL CALCULATED.3IONS-SCNC: 12.8 MMOL/L (ref 5–15)
BUN BLD-MCNC: 6 MG/DL (ref 8–20)
BUN/CREAT SERPL: 6.7 (ref 6.2–20.3)
CALCIUM SPEC-SCNC: 8.4 MG/DL (ref 8.9–10.3)
CHLORIDE SERPL-SCNC: 99 MMOL/L (ref 101–111)
CO2 SERPL-SCNC: 23 MMOL/L (ref 22–32)
CREAT BLD-MCNC: 0.9 MG/DL (ref 0.7–1.2)
GFR SERPL CREATININE-BSD FRML MDRD: 90 ML/MIN/1.73
GLUCOSE BLD-MCNC: 96 MG/DL (ref 65–99)
POTASSIUM BLD-SCNC: 3.8 MMOL/L (ref 3.6–5.1)
SODIUM BLD-SCNC: 131 MMOL/L (ref 136–144)

## 2019-08-08 PROCEDURE — G0378 HOSPITAL OBSERVATION PER HR: HCPCS

## 2019-08-08 PROCEDURE — 99213 OFFICE O/P EST LOW 20 MIN: CPT | Performed by: PSYCHIATRY & NEUROLOGY

## 2019-08-08 PROCEDURE — 80048 BASIC METABOLIC PNL TOTAL CA: CPT | Performed by: HOSPITALIST

## 2019-08-08 PROCEDURE — 96361 HYDRATE IV INFUSION ADD-ON: CPT

## 2019-08-08 PROCEDURE — 99217 PR OBSERVATION CARE DISCHARGE MANAGEMENT: CPT | Performed by: HOSPITALIST

## 2019-08-08 RX ORDER — ACETAMINOPHEN 500 MG
1000 TABLET ORAL ONCE
Status: COMPLETED | OUTPATIENT
Start: 2019-08-08 | End: 2019-08-08

## 2019-08-08 RX ORDER — NICOTINE 21 MG/24HR
1 PATCH, TRANSDERMAL 24 HOURS TRANSDERMAL EVERY 24 HOURS
Qty: 14 PATCH | Refills: 0 | Status: ON HOLD | OUTPATIENT
Start: 2019-08-09 | End: 2019-09-19

## 2019-08-08 RX ORDER — ACETAMINOPHEN 325 MG/1
650 TABLET ORAL EVERY 6 HOURS PRN
Status: DISCONTINUED | OUTPATIENT
Start: 2019-08-08 | End: 2019-08-08 | Stop reason: HOSPADM

## 2019-08-08 RX ADMIN — ONDANSETRON HYDROCHLORIDE 4 MG: 4 TABLET, FILM COATED ORAL at 08:56

## 2019-08-08 RX ADMIN — SERTRALINE HYDROCHLORIDE 75 MG: 50 TABLET ORAL at 08:19

## 2019-08-08 RX ADMIN — ACETAMINOPHEN 650 MG: 325 TABLET ORAL at 08:56

## 2019-08-08 RX ADMIN — LEVETIRACETAM 500 MG: 500 TABLET, FILM COATED ORAL at 08:19

## 2019-08-08 RX ADMIN — ACETAMINOPHEN 1000 MG: 500 TABLET, FILM COATED ORAL at 01:35

## 2019-08-08 RX ADMIN — OXCARBAZEPINE 900 MG: 150 TABLET, FILM COATED ORAL at 08:19

## 2019-08-08 RX ADMIN — SODIUM CHLORIDE 100 ML/HR: 900 INJECTION, SOLUTION INTRAVENOUS at 04:45

## 2019-08-08 NOTE — DISCHARGE SUMMARY
Valdemar Vo  1970  [id number]    Hospitalists Discharge Summary    Date of Admission: 8/7/2019  Date of Discharge:  8/8/2019    Primary Discharge Diagnoses:    1.  Acute EtOH Intoxication  2.  Suicidal Ideation w/ Chronic Depression  3.  Anion Gap Metabolic Acidosis  4.  Hypokalemia    Secondary Discharge Diagnoses:    1.  Seizure Disorder  2.  Tobacco abuse    History of Present Illness (taken from H&P):    Mr. Vo is a 49 year old obese  male with a past medical history significant for chronic depression, HTN, migraines, seizure disorder, ETOH abuse, tobacco abuse, and COPD.       He was initially brought in by EMS for possible seizures.  He received Versed in the ambulance and appeared to have more seizure activity upon arrival, but was not postictal and was responsive during his seizure activity.  He then apparently became hostile with staff and requested to be released stating he had a plan to kill himself.  He then received Geodon and Keppra.     Initial work up in the ER revealed potassium 3.3, CK total 148, UDS was positive for Benzos, and BAL was 0.16.     Hospital Course:    Mr. Vo was seen and evaluated by Psychiatry and cleared for intense outpatient work-up.  No medication changes were made.  I do not believe his metabolic acidosis was caused by ketacidosis based on additional laboratory data.  This resolved w/o further intervention.      PCP  Patient Care Team:  Bert Donis DO as PCP - General (Family Medicine)    Consults:   Consults     Date and Time Order Name Status Description    8/7/2019 0416 Inpatient Psychiatrist Consult Completed     8/7/2019 0334 Inpatient Hospitalist Consult Completed     7/29/2019 0118 Inpatient Hospitalist Consult Completed           Operations and Procedures Performed:       Ct Head Without Contrast    Result Date: 8/7/2019  Narrative: CT HEAD WO CONTRAST-  Date of Exam: 8/7/2019 6:02 PM  Indication: Seizures new or progressive.   Comparison: CT head 07/28/2019  Technique:  Without contrast, contiguous axial CT images of the head were obtained from skull base to vertex.  Coronal and sagittal reconstructions were performed.  Automated exposure control and iterative reconstruction methods were used.  FINDINGS No evidence of intracranial hemorrhage, mass, or midline shift. The ventricles appear normal in size for the patient's age. No extra-axial collections identified. The gray white matter differentiation is intact. No air-fluid levels identified within the paranasal sinuses. The extra-axial structures demonstrate no acute process. Cortical plate along the anterior margin of the left maxillary sinus is included. There is mucosal thickening in the maxillary, sphenoid and right ethmoid sinuses.      Impression: 1. The brain appears normal. 2. minor chronic sinusitis.  Electronically Signed By-Hilda Cage On:8/7/2019 7:36 PM This report was finalized on [id number] by  Hilda Cage, .    Ct Head Without Contrast    Result Date: 7/28/2019  Narrative: EXAMINATION: CT HEAD WO CONTRAST, CT FACIAL BONES WO CONTRAST, CT CERVICAL SPINE WO CONTRAST, CT THORACIC SPINE WO CONTRAST DATE: 7/28/2019 11:22 PM  HISTORY: Patient Data: Male, 49 years of age. Clinical Indication : Trauma  COMPARISON: None available.  TECHNIQUE: Thin section noncontrast axial images were obtained through the head. Coronal reformatted images were then created. Helical images were then acquired through the face and cervical spine. Sagittal and coronal reformatted images were then created. CT dose lowering techniques including automated exposure control, adjustment for patient size, and/or use of iterative reconstruction were used.  FINDINGS: HEAD CT:  Ventricles: Normal in size and configuration. Parenchyma: Normal attenuation.  Normal gray-white differentiation is preserved.   No CT evidence of confluent lobar cortical infarct.   No mass effect or midline shift. Intracranial  Hemorrhage: None. Bones/Extracranial soft tissues: No depressed calvarial fracture. Visualized Sinuses/Mastoids: Moderate maxillary and ethmoid sinus mucosal thickening.  No air fluid level. Mastoid air cells and middle ears are grossly clear. MAXILLOFACIAL CT: There is a fracture left upper second molar tooth. There is a defect in the inferolateral wall of the left maxillary sinus near the site of the fractured tooth; however, this is favored to be chronic and related to a dental abscess. Acute fracture lines do not definitively involve the maxillary alveolar ridge. The patient is status post ORIF of an orbital rim fracture on the left. Hardware appears intact and well seated. The mandible has normal morphology and the temporomandibular joints are intact.  Moderate maxillary and ethmoid sinus mucosal thickening.  The soft tissues of the face have normal density without soft tissue stranding, hematoma or mass. There is no radiopaque foreign body.  Intraorbital soft tissues are grossly normal.  CERVICAL SPINE CT: Vertebral column: There is straightening of the normal cervical lordosis, which may be on the basis of patient positioning or muscle spasm. No significant spondylolisthesis. Vertebral body heights are normal. The craniocervical junction appears normal. Facet joint alignment is normal on each side. No cervical spine fracture is identified. C2-3: No significant spinal stenosis or neural foraminal narrowing. C3-4: There is a broad based disc bulge.  There is no significant spinal stenosis.  There is mild neural foraminal narrowing from uncovertebral and facet hypertrophy bilaterally. C4-5: No significant spinal stenosis or neural foraminal narrowing. C5-6: There is osteophytic endplate ridging.  There is no significant spinal stenosis.  There is mild neural foraminal narrowing from uncovertebral and facet hypertrophy bilaterally. C6-7: There is osteophytic endplate ridging.  There is no significant spinal  stenosis.  The neural foramina are patent. C7-T1: No significant spinal stenosis or neural foraminal narrowing. THORACIC SPINE CT: Thoracic vertebral bodies are normally aligned. Vertebral body heights are normal. No fractures are identified. Thoracic facet joint alignment is normal bilaterally. T1-2 through T11-12: No significant spinal stenosis or neural foraminal narrowing.     Impression:  CT Head : 1.  No acute intracranial process detected. CT Cervical Spine: 1.  No evidence of acute fracture or traumatic malalignment. CT Facial Bones: 1.  Fractured left upper second molar tooth. 2.  Lucency at the root of the fractured tooth and involving the inferior wall of the maxillary sinus, favored to be chronic and related to dental abscess. CT thoracic spine: 1.  No evidence of acute fracture or traumatic malalignment. Josr Carlson MD Neuroradiologist Thank you for this referral.  This exam was interpreted by a fellowship trained neuroradiologist.   SLOT:  68  Electronically signed by:  Josr Carlson  7/28/2019 10:38 PM    Ct Cervical Spine Without Contrast    Result Date: 7/28/2019  Narrative: EXAMINATION: CT HEAD WO CONTRAST, CT FACIAL BONES WO CONTRAST, CT CERVICAL SPINE WO CONTRAST, CT THORACIC SPINE WO CONTRAST DATE: 7/28/2019 11:22 PM  HISTORY: Patient Data: Male, 49 years of age. Clinical Indication : Trauma  COMPARISON: None available.  TECHNIQUE: Thin section noncontrast axial images were obtained through the head. Coronal reformatted images were then created. Helical images were then acquired through the face and cervical spine. Sagittal and coronal reformatted images were then created. CT dose lowering techniques including automated exposure control, adjustment for patient size, and/or use of iterative reconstruction were used.  FINDINGS: HEAD CT:  Ventricles: Normal in size and configuration. Parenchyma: Normal attenuation.  Normal gray-white differentiation is preserved.   No CT evidence of  confluent lobar cortical infarct.   No mass effect or midline shift. Intracranial Hemorrhage: None. Bones/Extracranial soft tissues: No depressed calvarial fracture. Visualized Sinuses/Mastoids: Moderate maxillary and ethmoid sinus mucosal thickening.  No air fluid level. Mastoid air cells and middle ears are grossly clear. MAXILLOFACIAL CT: There is a fracture left upper second molar tooth. There is a defect in the inferolateral wall of the left maxillary sinus near the site of the fractured tooth; however, this is favored to be chronic and related to a dental abscess. Acute fracture lines do not definitively involve the maxillary alveolar ridge. The patient is status post ORIF of an orbital rim fracture on the left. Hardware appears intact and well seated. The mandible has normal morphology and the temporomandibular joints are intact.  Moderate maxillary and ethmoid sinus mucosal thickening.  The soft tissues of the face have normal density without soft tissue stranding, hematoma or mass. There is no radiopaque foreign body.  Intraorbital soft tissues are grossly normal.  CERVICAL SPINE CT: Vertebral column: There is straightening of the normal cervical lordosis, which may be on the basis of patient positioning or muscle spasm. No significant spondylolisthesis. Vertebral body heights are normal. The craniocervical junction appears normal. Facet joint alignment is normal on each side. No cervical spine fracture is identified. C2-3: No significant spinal stenosis or neural foraminal narrowing. C3-4: There is a broad based disc bulge.  There is no significant spinal stenosis.  There is mild neural foraminal narrowing from uncovertebral and facet hypertrophy bilaterally. C4-5: No significant spinal stenosis or neural foraminal narrowing. C5-6: There is osteophytic endplate ridging.  There is no significant spinal stenosis.  There is mild neural foraminal narrowing from uncovertebral and facet hypertrophy bilaterally.  C6-7: There is osteophytic endplate ridging.  There is no significant spinal stenosis.  The neural foramina are patent. C7-T1: No significant spinal stenosis or neural foraminal narrowing. THORACIC SPINE CT: Thoracic vertebral bodies are normally aligned. Vertebral body heights are normal. No fractures are identified. Thoracic facet joint alignment is normal bilaterally. T1-2 through T11-12: No significant spinal stenosis or neural foraminal narrowing.     Impression:  CT Head : 1.  No acute intracranial process detected. CT Cervical Spine: 1.  No evidence of acute fracture or traumatic malalignment. CT Facial Bones: 1.  Fractured left upper second molar tooth. 2.  Lucency at the root of the fractured tooth and involving the inferior wall of the maxillary sinus, favored to be chronic and related to dental abscess. CT thoracic spine: 1.  No evidence of acute fracture or traumatic malalignment. Josr Carlson MD Neuroradiologist Thank you for this referral.  This exam was interpreted by a fellowship trained neuroradiologist.   SLOT:  68  Electronically signed by:  Josr Carlson  7/28/2019 10:38 PM    Ct Thoracic Spine Without Contrast    Result Date: 7/28/2019  Narrative: EXAMINATION: CT HEAD WO CONTRAST, CT FACIAL BONES WO CONTRAST, CT CERVICAL SPINE WO CONTRAST, CT THORACIC SPINE WO CONTRAST DATE: 7/28/2019 11:22 PM  HISTORY: Patient Data: Male, 49 years of age. Clinical Indication : Trauma  COMPARISON: None available.  TECHNIQUE: Thin section noncontrast axial images were obtained through the head. Coronal reformatted images were then created. Helical images were then acquired through the face and cervical spine. Sagittal and coronal reformatted images were then created. CT dose lowering techniques including automated exposure control, adjustment for patient size, and/or use of iterative reconstruction were used.  FINDINGS: HEAD CT:  Ventricles: Normal in size and configuration. Parenchyma: Normal attenuation.   Normal gray-white differentiation is preserved.   No CT evidence of confluent lobar cortical infarct.   No mass effect or midline shift. Intracranial Hemorrhage: None. Bones/Extracranial soft tissues: No depressed calvarial fracture. Visualized Sinuses/Mastoids: Moderate maxillary and ethmoid sinus mucosal thickening.  No air fluid level. Mastoid air cells and middle ears are grossly clear. MAXILLOFACIAL CT: There is a fracture left upper second molar tooth. There is a defect in the inferolateral wall of the left maxillary sinus near the site of the fractured tooth; however, this is favored to be chronic and related to a dental abscess. Acute fracture lines do not definitively involve the maxillary alveolar ridge. The patient is status post ORIF of an orbital rim fracture on the left. Hardware appears intact and well seated. The mandible has normal morphology and the temporomandibular joints are intact.  Moderate maxillary and ethmoid sinus mucosal thickening.  The soft tissues of the face have normal density without soft tissue stranding, hematoma or mass. There is no radiopaque foreign body.  Intraorbital soft tissues are grossly normal.  CERVICAL SPINE CT: Vertebral column: There is straightening of the normal cervical lordosis, which may be on the basis of patient positioning or muscle spasm. No significant spondylolisthesis. Vertebral body heights are normal. The craniocervical junction appears normal. Facet joint alignment is normal on each side. No cervical spine fracture is identified. C2-3: No significant spinal stenosis or neural foraminal narrowing. C3-4: There is a broad based disc bulge.  There is no significant spinal stenosis.  There is mild neural foraminal narrowing from uncovertebral and facet hypertrophy bilaterally. C4-5: No significant spinal stenosis or neural foraminal narrowing. C5-6: There is osteophytic endplate ridging.  There is no significant spinal stenosis.  There is mild neural  foraminal narrowing from uncovertebral and facet hypertrophy bilaterally. C6-7: There is osteophytic endplate ridging.  There is no significant spinal stenosis.  The neural foramina are patent. C7-T1: No significant spinal stenosis or neural foraminal narrowing. THORACIC SPINE CT: Thoracic vertebral bodies are normally aligned. Vertebral body heights are normal. No fractures are identified. Thoracic facet joint alignment is normal bilaterally. T1-2 through T11-12: No significant spinal stenosis or neural foraminal narrowing.     Impression:  CT Head : 1.  No acute intracranial process detected. CT Cervical Spine: 1.  No evidence of acute fracture or traumatic malalignment. CT Facial Bones: 1.  Fractured left upper second molar tooth. 2.  Lucency at the root of the fractured tooth and involving the inferior wall of the maxillary sinus, favored to be chronic and related to dental abscess. CT thoracic spine: 1.  No evidence of acute fracture or traumatic malalignment. Josr Carlson MD Neuroradiologist Thank you for this referral.  This exam was interpreted by a fellowship trained neuroradiologist.   SLOT:  68  Electronically signed by:  Josr Carlson  7/28/2019 10:38 PM    Xr Chest 1 View    Result Date: 7/29/2019  Narrative: DATE OF EXAM: 7/28/2019 11:15 PM  PROCEDURE: XR CHEST 1 VW-  INDICATIONS: cp  COMPARISON: 6/21/2019.  TECHNIQUE: Single radiographic AP view of the chest was obtained.  FINDINGS: There is pulmonary hypoinflation. Probably no acute infiltrate is seen. No cardiac enlargement. No pneumothorax is identified.      Impression: No acute infiltrate is seen.  Electronically Signed By-Dr. Omid Iraheta MD On:7/29/2019 6:40 AM This report was finalized on [id number] by Dr. Omid Iraheat MD.    Ct Facial Bones Without Contrast    Result Date: 7/28/2019  Narrative: EXAMINATION: CT HEAD WO CONTRAST, CT FACIAL BONES WO CONTRAST, CT CERVICAL SPINE WO CONTRAST, CT THORACIC SPINE WO CONTRAST DATE:  7/28/2019 11:22 PM  HISTORY: Patient Data: Male, 49 years of age. Clinical Indication : Trauma  COMPARISON: None available.  TECHNIQUE: Thin section noncontrast axial images were obtained through the head. Coronal reformatted images were then created. Helical images were then acquired through the face and cervical spine. Sagittal and coronal reformatted images were then created. CT dose lowering techniques including automated exposure control, adjustment for patient size, and/or use of iterative reconstruction were used.  FINDINGS: HEAD CT:  Ventricles: Normal in size and configuration. Parenchyma: Normal attenuation.  Normal gray-white differentiation is preserved.   No CT evidence of confluent lobar cortical infarct.   No mass effect or midline shift. Intracranial Hemorrhage: None. Bones/Extracranial soft tissues: No depressed calvarial fracture. Visualized Sinuses/Mastoids: Moderate maxillary and ethmoid sinus mucosal thickening.  No air fluid level. Mastoid air cells and middle ears are grossly clear. MAXILLOFACIAL CT: There is a fracture left upper second molar tooth. There is a defect in the inferolateral wall of the left maxillary sinus near the site of the fractured tooth; however, this is favored to be chronic and related to a dental abscess. Acute fracture lines do not definitively involve the maxillary alveolar ridge. The patient is status post ORIF of an orbital rim fracture on the left. Hardware appears intact and well seated. The mandible has normal morphology and the temporomandibular joints are intact.  Moderate maxillary and ethmoid sinus mucosal thickening.  The soft tissues of the face have normal density without soft tissue stranding, hematoma or mass. There is no radiopaque foreign body.  Intraorbital soft tissues are grossly normal.  CERVICAL SPINE CT: Vertebral column: There is straightening of the normal cervical lordosis, which may be on the basis of patient positioning or muscle spasm. No  significant spondylolisthesis. Vertebral body heights are normal. The craniocervical junction appears normal. Facet joint alignment is normal on each side. No cervical spine fracture is identified. C2-3: No significant spinal stenosis or neural foraminal narrowing. C3-4: There is a broad based disc bulge.  There is no significant spinal stenosis.  There is mild neural foraminal narrowing from uncovertebral and facet hypertrophy bilaterally. C4-5: No significant spinal stenosis or neural foraminal narrowing. C5-6: There is osteophytic endplate ridging.  There is no significant spinal stenosis.  There is mild neural foraminal narrowing from uncovertebral and facet hypertrophy bilaterally. C6-7: There is osteophytic endplate ridging.  There is no significant spinal stenosis.  The neural foramina are patent. C7-T1: No significant spinal stenosis or neural foraminal narrowing. THORACIC SPINE CT: Thoracic vertebral bodies are normally aligned. Vertebral body heights are normal. No fractures are identified. Thoracic facet joint alignment is normal bilaterally. T1-2 through T11-12: No significant spinal stenosis or neural foraminal narrowing.     Impression:  CT Head : 1.  No acute intracranial process detected. CT Cervical Spine: 1.  No evidence of acute fracture or traumatic malalignment. CT Facial Bones: 1.  Fractured left upper second molar tooth. 2.  Lucency at the root of the fractured tooth and involving the inferior wall of the maxillary sinus, favored to be chronic and related to dental abscess. CT thoracic spine: 1.  No evidence of acute fracture or traumatic malalignment. Josr Carlson MD Neuroradiologist Thank you for this referral.  This exam was interpreted by a fellowship trained neuroradiologist.   SLOT:  68  Electronically signed by:  Josr Carlson  7/28/2019 10:38 PM      Allergies:  is allergic to codeine.      Discharge Medications:     Discharge Medications      New Medications      Instructions  Start Date   nicotine 21 MG/24HR patch  Commonly known as:  NICODERM CQ   1 patch, Transdermal, Every 24 Hours   Start Date:  8/9/2019        Continue These Medications      Instructions Start Date   levETIRAcetam 500 MG tablet  Commonly known as:  KEPPRA   500 mg, Oral, 2 Times Daily      meloxicam 7.5 MG tablet  Commonly known as:  MOBIC   7.5 mg, Oral, 2 Times Daily PRN      OXcarbazepine 300 MG tablet  Commonly known as:  TRILEPTAL   900 mg, Oral, 2 Times Daily      sertraline 25 MG tablet  Commonly known as:  ZOLOFT   75 mg, Oral, Daily         Stop These Medications    baclofen 10 MG tablet  Commonly known as:  LIORESAL            Last Lab Results:   Lab Results (most recent)     Procedure Component Value Units Date/Time    Basic Metabolic Panel [[id number]]  (Abnormal) Collected:  08/08/19 1032    Specimen:  Blood Updated:  08/08/19 1143     Glucose 96 mg/dL      BUN 6 mg/dL      Creatinine 0.90 mg/dL      Sodium 131 mmol/L      Potassium 3.8 mmol/L      Chloride 99 mmol/L      CO2 23.0 mmol/L      Calcium 8.4 mg/dL      eGFR Non African Amer 90 mL/min/1.73      BUN/Creatinine Ratio 6.7     Anion Gap 12.8 mmol/L     Basic Metabolic Panel [[id number]]  (Abnormal) Collected:  08/07/19 1006    Specimen:  Blood Updated:  08/07/19 1053     Glucose 94 mg/dL      BUN 7 mg/dL      Creatinine 0.80 mg/dL      Sodium 134 mmol/L      Potassium 3.4 mmol/L      Chloride 104 mmol/L      CO2 19.0 mmol/L      Calcium 7.6 mg/dL      eGFR Non African Amer 103 mL/min/1.73      BUN/Creatinine Ratio 8.8     Anion Gap 14.4 mmol/L     [place] Draw [[id number]] Collected:  08/07/19 0200    Specimen:  Blood Updated:  08/07/19 0306    Narrative:       The following orders were created for panel order [place] Draw.  Procedure                               Abnormality         Status                     ---------                               -----------         ------                     Light Blue Top[[id number]]                                    Final result               Gold Top - SST[[id number]]                                   Final result               Green Top (No Gel)[[id number]]                               Final result                 Please view results for these tests on the individual orders.    Light Blue Top [[id number]] Collected:  08/07/19 0200    Specimen:  Blood Updated:  08/07/19 0306     Extra Tube hold for add-on     Comment: Auto resulted       Gold Top - SST [[id number]] Collected:  08/07/19 0200    Specimen:  Blood Updated:  08/07/19 0306     Extra Tube Hold for add-ons.     Comment: Auto resulted.       Green Top (No Gel) [[id number]] Collected:  08/07/19 0200    Specimen:  Blood Updated:  08/07/19 0306     Extra Tube Hold for add-ons.     Comment: Auto resulted.       Urine Drug Screen - Urine, Catheter [[id number]]  (Abnormal) Collected:  08/07/19 0214    Specimen:  Urine, Catheter Updated:  08/07/19 0257     Barbiturates Screen, Urine Negative     Benzodiazepine Screen, Urine Positive     Cocaine Screen, Urine Negative     Opiate Screen Negative     THC, Screen, Urine Negative     Methadone Screen, Urine Negative     Amphetamine Screen, Urine Negative     Creatinine, Urine 25.5 mg/dL      Phencyclidine (PCP), Urine Negative    Narrative:       All urine drugs of abuse requests without chain of custody are for medical screening purposes only.  False positives are possible.      Comprehensive Metabolic Panel [[id number]]  (Abnormal) Collected:  08/07/19 0158    Specimen:  Blood Updated:  08/07/19 0225     Glucose 89 mg/dL      BUN 12 mg/dL      Creatinine 1.10 mg/dL      Sodium 131 mmol/L      Potassium 3.3 mmol/L      Chloride 99 mmol/L      CO2 15.0 mmol/L      Calcium 7.6 mg/dL      Total Protein 6.2 g/dL      Albumin 3.60 g/dL      ALT (SGPT) 16 U/L      AST (SGOT) 22 U/L      Alkaline Phosphatase 84 U/L      Total Bilirubin 0.4 mg/dL      eGFR Non African Amer 71 mL/min/1.73      Globulin 2.6 gm/dL      A/G Ratio 1.4 g/dL       BUN/Creatinine Ratio 10.9     Anion Gap 20.3 mmol/L     Salicylate Level [[id number]]  (Normal) Collected:  08/07/19 0158    Specimen:  Blood Updated:  08/07/19 0225     Salicylate <4.0 mg/dL     Acetaminophen Level [[id number]]  (Abnormal) Collected:  08/07/19 0158    Specimen:  Blood Updated:  08/07/19 0225     Acetaminophen <10.0 mcg/mL     Narrative:       Acetaminophen Therapeutic Range  5-20 ug/mL      Hours after ingestion            Toxic Value    4 Hours                           150 ug/mL    8 Hours                            70 ug/mL   12 Hours                            40 ug/mL   16 Hours                            20 ug/mL    These values apply to a single ingestion only.     Urinalysis With Microscopic If Indicated (No Culture) - Urine, Catheter [[id number]]  (Abnormal) Collected:  08/07/19 0214    Specimen:  Urine, Catheter Updated:  08/07/19 0224     Color, UA Yellow     Appearance, UA Clear     pH, UA <=5.0     Specific Gravity, UA 1.008     Glucose, UA Negative     Ketones, UA Negative     Bilirubin, UA Negative     Blood, UA Trace     Protein, UA Negative     Leuk Esterase, UA Negative     Nitrite, UA Negative     Urobilinogen, UA 0.2 E.U./dL    Urinalysis, Microscopic Only - Urine, Catheter [[id number]] Collected:  08/07/19 0214    Specimen:  Urine, Catheter Updated:  08/07/19 0224     RBC, UA None Seen /HPF      WBC, UA None Seen /HPF      Bacteria, UA None Seen /HPF      Squamous Epithelial Cells, UA None Seen /HPF      Hyaline Casts, UA None Seen /LPF      Methodology Automated Microscopy    CK [[id number]]  (Normal) Collected:  08/07/19 0158    Specimen:  Blood Updated:  08/07/19 0224     Creatine Kinase 148 U/L     Ethanol [[id number]]  (Abnormal) Collected:  08/07/19 0158    Specimen:  Blood Updated:  08/07/19 0224     Ethanol % 0.168 %     Narrative:       Plasma Ethanol Clinical Symptoms:    ETOH (%)               Clinical Symptom  .01-.05              No apparent influence  .03-.12               Euphoria, Diminished judgment and attention   .09-.25              Impaired comprehension, Muscle incoordination  .18-.30              Confusion, Staggered gait, Slurred speech  .25-.40              Markedly decreased response to stimuli, unable to stand or                        walk, vomitting, sleep or stupor  .35-.50              Comatose, Anesthesia, Subnormal body temperature      CBC & Differential [[id number]] Collected:  08/07/19 0157    Specimen:  Blood Updated:  08/07/19 0207    Narrative:       The following orders were created for panel order CBC & Differential.  Procedure                               Abnormality         Status                     ---------                               -----------         ------                     CBC Auto Differential[[id number]]        Abnormal            Final result                 Please view results for these tests on the individual orders.    CBC Auto Differential [[id number]]  (Abnormal) Collected:  08/07/19 0157    Specimen:  Blood Updated:  08/07/19 0207     WBC 5.90 10*3/mm3      RBC 4.19 10*6/mm3      Hemoglobin 12.9 g/dL      Hematocrit 37.6 %      MCV 89.9 fL      MCH 30.7 pg      MCHC 34.2 g/dL      RDW 13.1 %      RDW-SD 42.0 fl      MPV 6.9 fL      Platelets 201 10*3/mm3      Neutrophil % 60.5 %      Lymphocyte % 26.4 %      Monocyte % 9.1 %      Eosinophil % 3.1 %      Basophil % 0.9 %      Neutrophils, Absolute 3.60 10*3/mm3      Lymphocytes, Absolute 1.60 10*3/mm3      Monocytes, Absolute 0.50 10*3/mm3      Eosinophils, Absolute 0.20 10*3/mm3      Basophils, Absolute 0.10 10*3/mm3      nRBC 0.1 /100 WBC         Imaging Results (most recent)     Procedure Component Value Units Date/Time    CT Head Without Contrast [[id number]] Collected:  08/07/19 1932     Updated:  08/07/19 1938    Narrative:       CT HEAD WO CONTRAST-     Date of Exam: 8/7/2019 6:02 PM     Indication: Seizures new or progressive.     Comparison: CT head 07/28/2019      Technique:  Without contrast, contiguous axial CT images of the head  were obtained from skull base to vertex.  Coronal and sagittal  reconstructions were performed.  Automated exposure control and  iterative reconstruction methods were used.     FINDINGS  No evidence of intracranial hemorrhage, mass, or midline shift. The  ventricles appear normal in size for the patient's age. No extra-axial  collections identified. The gray white matter differentiation is intact.  No air-fluid levels identified within the paranasal sinuses. The  extra-axial structures demonstrate no acute process. Cortical plate  along the anterior margin of the left maxillary sinus is included. There  is mucosal thickening in the maxillary, sphenoid and right ethmoid  sinuses.       Impression:       1. The brain appears normal.   2. minor chronic sinusitis.     Electronically Signed By-Hilda Cage On:8/7/2019 7:36 PM  This report was finalized on [id number] by  Hilda Cage, .          PROCEDURES      Condition on Discharge:  Stable    Physical Exam at Discharge  Vital Signs  Temp:  [97.7 °F (36.5 °C)-98.7 °F (37.1 °C)] 97.7 °F (36.5 °C)  Heart Rate:  [] 84  Resp:  [16-20] 20  BP: (134-159)/(79-90) 142/89    Physical Exam:  Physical Exam   Constitutional: Patient appears well-developed and well-nourished and in no acute distress   Cardiovascular: Regular rate, regular rhythm, S1 normal and S2 normal.  Exam reveals no gallop and no friction rub.  No murmur heard.  Pulmonary/Chest: Lungs are clear to auscultation bilaterally. No respiratory distress. No wheezes. No rhonchi. No rales.   Abdominal: Soft. Bowel sounds are normal. There is no tenderness.   Musculoskeletal: Normal Muscle tone  Neurological: Cranial nerves II-XII are grossly intact with no focal deficits.    Discharge Disposition  Home w/ family    Visiting Nurse:    No     Home PT/OT:  No     Home Safety Evaluation:  No     DME  None    Discharge Diet:      Dietary Orders  (From admission, onward)    Start     Ordered    08/07/19 0415  Diet Regular  Diet Effective Now     Question:  Diet / Texture / Consistency  Answer:  Regular    08/07/19 0415          Activity at Discharge:  As tolerated      Follow-up Appointments  No future appointments.  Additional Instructions for the Follow-ups that You Need to Schedule     Discharge Follow-up with PCP   As directed       Currently Documented PCP:    Bert Donis DO    PCP Phone Number:    [phone]     Follow Up Details:  1 week         Discharge Follow-up with Specialty: Psychiatry/Sheryl; 1 Week   As directed      Specialty:  Psychiatry/[facility]    Follow Up:  1 Week               Test Results Pending at Discharge  None     Vincent Zuleta MD  08/08/19  12:14 PM

## 2019-08-08 NOTE — PROGRESS NOTES
Chief complaint  Depression     Subjective .     History of present illness:  . The patient is a 49 y.o. male who was admitted secondary to SZ .   past medical history significant for chronic depression, HTN, migraines, seizure disorder, ETOH abuse, tobacco abuse, and COPD.    Psychiatric consult was requested by JAMAL Khan 2ry to depression with SI.  Apparently he  became hostile with staff in the ER in the postictal period and requested to be released stating he had a plan to kill himself.  He then received Geodon and Keppra.     Today the patient reported feeling depressed secondary to multiple stressors including health issues, he had problems at work because he had seizures.  he denied feeling hopeless and helpless, the patient admitted to having thoughts about death in general but did not have any plan, did not have any intention to kill himself.  He admitted to drinking alcohol 2-3 times per week couple of beers to alleviate symptoms of depression, patient did not report symptoms of freddy or hypomania  The patient does not remember making any statements in the ER  The pt was feeling safe, focused on d/c, he was willing to do outpt tx at [facility]   Psychiatric history: The patient was treated for depression in the remote past, he does not remember medications he was on, history of suicide attempts  Collateral history was obtained from his fiancée, she confirmed the patient been depressed, frequent suicidal statements, history of attempts       Review of Systems   All systems were reviewed and negative except for:  Constitution:  positive for fatigue  Behavioral/Psych: positive for  depression    Histor       Medications Prior to Admission   Medication Sig Dispense Refill Last Dose   • baclofen (LIORESAL) 10 MG tablet Take 10 mg by mouth 3 (Three) Times a Day.   Unknown at Unknown time   • levETIRAcetam (KEPPRA) 500 MG tablet Take 1 tablet by mouth 2 (Two) Times a Day. 60 tablet 0    • meloxicam (MOBIC)  "7.5 MG tablet Take 7.5 mg by mouth 2 (Two) Times a Day As Needed.      • OXcarbazepine (TRILEPTAL) 300 MG tablet Take 3 tablets by mouth 2 (Two) Times a Day. 60 tablet 0    • sertraline (ZOLOFT) 25 MG tablet Take 75 mg by mouth Daily.           Scheduled Meds:    levETIRAcetam 500 mg Oral BID   nicotine 1 patch Transdermal Q24H   OXcarbazepine 900 mg Oral BID   sertraline 75 mg Oral Daily   sodium chloride 3 mL Intravenous Q12H        Continuous Infusions:    sodium chloride 100 mL/hr Last Rate: 100 mL/hr (08/08/19 1159)       PRN Meds:  •  acetaminophen  •  LORazepam **OR** LORazepam **OR** LORazepam **OR** LORazepam **OR** LORazepam **OR** LORazepam  •  magnesium sulfate **OR** magnesium sulfate in D5W 1g/100mL (PREMIX)  •  ondansetron **OR** ondansetron  •  potassium chloride  •  potassium chloride  •  sodium chloride      Allergies:  Codeine      Objective     Vital Signs   /89   Pulse 84   Temp 97.7 °F (36.5 °C) (Oral)   Resp 20   Ht 165.1 cm (65\")   Wt 106 kg (233 lb 11 oz)   SpO2 97%   BMI 38.89 kg/m²     Physical Exam:     General Appearance:    In NAD    Head:    Normocephalic, without obvious abnormality, atraumatic   Eyes:            Lids and lashes normal, conjunctivae and sclerae normal, no   icterus, no pallor, corneas clear, PERRLA   Skin:   No bleeding, bruising or rash          Neurologic:   Cranial nerves 2 - 12 grossly intact, sensation intact, DTR       present and equal bilaterally       Mental Status Exam:    Hygiene:   fair  Cooperation:  Cooperative  Eye Contact:  Good  Psychomotor Behavior:  Appropriate  Affect:  Blunted  Hopelessness: Denies  Speech:  Normal  Thought Progress:  Goal directed and Linear  Thought Content:  Normal  Suicidal:  None  Homicidal:  None  Hallucinations:  None  Delusion:  None  Memory:  Intact  Orientation:  Person, Place, Time and Situation  Reliability:  fair  Insight:  Fair  Judgement:  Fair  Impulse Control:  Impaired  Physical/Medical Issues:  Yes " SZ    Medications and allergies reviewed     Lab Results   Component Value Date    GLUCOSE 96 08/08/2019    CALCIUM 8.4 (L) 08/08/2019     (L) 08/08/2019    K 3.8 08/08/2019    CO2 23.0 08/08/2019    CL 99 (L) 08/08/2019    BUN 6 (L) 08/08/2019    CREATININE 0.90 08/08/2019    EGFRIFNONA 90 08/08/2019    BCR 6.7 08/08/2019    ANIONGAP 12.8 08/08/2019       Last Urine Toxicity     LAST URINE TOXICITY RESULTS Latest Ref Rng & Units 8/7/2019 7/29/2019    CREATININE UR mg/dL 25.5 44.9    AMPHETAMINES SCREEN, URINE Negative Negative Negative    BARBITURATES SCREEN Negative Negative Negative    BENZODIAZEPINE SCREEN, URINE Negative Positive(A) Positive(A)    COCAINE SCREEN, URINE Negative Negative Positive(A)    METHADONE SCREEN, URINE Negative Negative Negative          No results found for: PHENYTOIN, PHENOBARB, VALPROATE, CBMZ    Lab Results   Component Value Date     (L) 08/08/2019    BUN 6 (L) 08/08/2019    CREATININE 0.90 08/08/2019    TSH 2.420 06/06/2019    WBC 5.90 08/07/2019       Brief Urine Lab Results  (Last result in the past 365 days)      Color   Clarity   Blood   Leuk Est   Nitrite   Protein   CREAT   Urine HCG        08/07/19 0214             25.5       08/07/19 0214 Yellow Clear Trace Negative Negative Negative               Assessment/Plan       Suicidal ideation    Major depressive disorder, recurrent episode, severe (CMS/HCC)       LABS: reviewed     Assessment: Major depressive d/o severe recurrent  Alc abuse   Treatment Plan: the pt denied any intent to harm himself, he was willing to go to [facility] for IOP and try meds  Pt's wife June ([phone]) was contacted about d/c plan, she was in agreement , d/c and safety plan was discussed with the pt and his wife, they verbalized understanding   The pt can be dc when medically stable   Treatment Plan discussed with: Patient and Family    I discussed the patients findings and my recommendations with patient, family and nursing staff    I  have reviewed and approved the behavioral health treatment plans and problem list. Yes     Referring MD has access to consult report and progress notes in EMR     Kathy Olsen MD  08/08/19  2:22 PM

## 2019-08-08 NOTE — PLAN OF CARE
Problem: Suicide Risk (Adult)  Goal: Identify Related Risk Factors and Signs and Symptoms  Outcome: Ongoing (interventions implemented as appropriate)   08/08/19 0405   Suicide Risk (Adult)   Related Risk Factors (Suicide Risk) multiple stressors;substance use   Signs and Symptoms (Suicide Risk) anxiety;low self-esteem/self-worth     Goal: Strength-Based Wellness/Recovery  Outcome: Ongoing (interventions implemented as appropriate)   08/08/19 0405   Suicide Risk (Adult)   Strength-Based Wellness/Recovery making progress toward outcome     Goal: Physical Safety  Outcome: Ongoing (interventions implemented as appropriate)   08/08/19 0405   Suicide Risk (Adult)   Physical Safety making progress toward outcome       Problem: Fall Risk (Adult)  Goal: Identify Related Risk Factors and Signs and Symptoms  Outcome: Ongoing (interventions implemented as appropriate)   08/08/19 0405   Fall Risk (Adult)   Related Risk Factors (Fall Risk) depression/anxiety     Goal: Absence of Fall  Outcome: Ongoing (interventions implemented as appropriate)   08/08/19 0405   Fall Risk (Adult)   Absence of Fall making progress toward outcome       Problem: Patient Care Overview  Goal: Plan of Care Review  Outcome: Ongoing (interventions implemented as appropriate)   08/08/19 0405   Coping/Psychosocial   Plan of Care Reviewed With patient;significant other   Plan of Care Review   Progress improving

## 2019-08-08 NOTE — PLAN OF CARE
Problem: Suicide Risk (Adult)  Goal: Identify Related Risk Factors and Signs and Symptoms  Outcome: Outcome(s) achieved Date Met: 08/08/19    Goal: Strength-Based Wellness/Recovery  Outcome: Outcome(s) achieved Date Met: 08/08/19    Goal: Physical Safety  Outcome: Outcome(s) achieved Date Met: 08/08/19      Problem: Fall Risk (Adult)  Goal: Identify Related Risk Factors and Signs and Symptoms  Outcome: Outcome(s) achieved Date Met: 08/08/19    Goal: Absence of Fall  Outcome: Outcome(s) achieved Date Met: 08/08/19      Problem: Restraint, Behavioral (Acute Care)  Goal: Behavioral Restraint: Absence of Injury/Harm  Outcome: Outcome(s) achieved Date Met: 08/08/19    Goal: Behavioral Restraint: Discontinuation Criteria Achieved  Outcome: Outcome(s) achieved Date Met: 08/08/19    Goal: Behavioral Restraint: Preservation of Dignity/Wellbeing  Outcome: Outcome(s) achieved Date Met: 08/08/19      Problem: Patient Care Overview  Goal: Plan of Care Review  Outcome: Outcome(s) achieved Date Met: 08/08/19    Goal: Individualization and Mutuality  Outcome: Outcome(s) achieved Date Met: 08/08/19    Goal: Discharge Needs Assessment  Outcome: Outcome(s) achieved Date Met: 08/08/19    Goal: Interprofessional Rounds/Family Conf  Outcome: Outcome(s) achieved Date Met: 08/08/19      Problem: Patient Care Overview  Goal: Plan of Care Review  Outcome: Outcome(s) achieved Date Met: 08/08/19    Goal: Individualization and Mutuality  Outcome: Outcome(s) achieved Date Met: 08/08/19    Goal: Discharge Needs Assessment  Outcome: Outcome(s) achieved Date Met: 08/08/19    Goal: Interprofessional Rounds/Family Conf  Outcome: Outcome(s) achieved Date Met: 08/08/19

## 2019-08-08 NOTE — PROGRESS NOTES
Continued Stay Note  ROMELIA Villanueva     Patient Name: Valdemar Vo  MRN: [id number]  Today's Date: 8/8/2019    Admit Date: 8/7/2019    Discharge Plan     Row Name 08/08/19 1205       Plan    Plan  d/c plan home psych recommending out pt  tx at [facility]  Simultaneous filing. User may be unaware of other data.        Discharge Codes    No documentation.             Nereyda Sweeney RN

## 2019-08-09 ENCOUNTER — READMISSION MANAGEMENT (OUTPATIENT)
Dept: CALL CENTER | Facility: HOSPITAL | Age: 49
End: 2019-08-09

## 2019-08-09 NOTE — OUTREACH NOTE
Prep Survey      Responses   Facility patient discharged from?  [facility]   Is patient eligible?  No   What are the reasons patient is not eligible?  [facility]   Does the patient have one of the following disease processes/diagnoses(primary or secondary)?  Other   Prep survey completed?  Yes          Jenae Reynolds RN

## 2019-08-09 NOTE — PROGRESS NOTES
Case Management Discharge Note    Final Note: pt to f/u out pt liu                  Final Discharge Disposition Code: 01 - home or self-care

## 2019-08-30 ENCOUNTER — HOSPITAL ENCOUNTER (EMERGENCY)
Facility: HOSPITAL | Age: 49
Discharge: LEFT BEFORE DISCHARGE | End: 2019-08-30
Attending: EMERGENCY MEDICINE | Admitting: EMERGENCY MEDICINE

## 2019-08-30 ENCOUNTER — APPOINTMENT (OUTPATIENT)
Dept: CT IMAGING | Facility: HOSPITAL | Age: 49
End: 2019-08-30

## 2019-08-30 VITALS
BODY MASS INDEX: 30.8 KG/M2 | WEIGHT: 220 LBS | HEART RATE: 85 BPM | OXYGEN SATURATION: 96 % | HEIGHT: 71 IN | DIASTOLIC BLOOD PRESSURE: 92 MMHG | SYSTOLIC BLOOD PRESSURE: 137 MMHG | TEMPERATURE: 98.4 F | RESPIRATION RATE: 17 BRPM

## 2019-08-30 DIAGNOSIS — E87.1 HYPONATREMIA: ICD-10-CM

## 2019-08-30 DIAGNOSIS — R56.9 SEIZURE (HCC): Primary | ICD-10-CM

## 2019-08-30 LAB
ALBUMIN SERPL-MCNC: 4.2 G/DL (ref 3.5–5.2)
ALBUMIN/GLOB SERPL: 1.6 G/DL
ALP SERPL-CCNC: 102 U/L (ref 39–117)
ALT SERPL W P-5'-P-CCNC: 9 U/L (ref 1–41)
ANION GAP SERPL CALCULATED.3IONS-SCNC: 12.1 MMOL/L (ref 5–15)
AST SERPL-CCNC: 17 U/L (ref 1–40)
BASOPHILS # BLD AUTO: 0.04 10*3/MM3 (ref 0–0.2)
BASOPHILS NFR BLD AUTO: 0.7 % (ref 0–1.5)
BILIRUB SERPL-MCNC: <0.2 MG/DL (ref 0.2–1.2)
BUN BLD-MCNC: 14 MG/DL (ref 6–20)
BUN/CREAT SERPL: 17.5 (ref 7–25)
CALCIUM SPEC-SCNC: 8.4 MG/DL (ref 8.6–10.5)
CHLORIDE SERPL-SCNC: 92 MMOL/L (ref 98–107)
CO2 SERPL-SCNC: 20.9 MMOL/L (ref 22–29)
CREAT BLD-MCNC: 0.8 MG/DL (ref 0.76–1.27)
DEPRECATED RDW RBC AUTO: 39.5 FL (ref 37–54)
EOSINOPHIL # BLD AUTO: 0.09 10*3/MM3 (ref 0–0.4)
EOSINOPHIL NFR BLD AUTO: 1.5 % (ref 0.3–6.2)
ERYTHROCYTE [DISTWIDTH] IN BLOOD BY AUTOMATED COUNT: 12.2 % (ref 12.3–15.4)
GFR SERPL CREATININE-BSD FRML MDRD: 103 ML/MIN/1.73
GLOBULIN UR ELPH-MCNC: 2.6 GM/DL
GLUCOSE BLD-MCNC: 87 MG/DL (ref 65–99)
HCT VFR BLD AUTO: 38.5 % (ref 37.5–51)
HGB BLD-MCNC: 13.4 G/DL (ref 13–17.7)
IMM GRANULOCYTES # BLD AUTO: 0.02 10*3/MM3 (ref 0–0.05)
IMM GRANULOCYTES NFR BLD AUTO: 0.3 % (ref 0–0.5)
LYMPHOCYTES # BLD AUTO: 0.88 10*3/MM3 (ref 0.7–3.1)
LYMPHOCYTES NFR BLD AUTO: 14.4 % (ref 19.6–45.3)
MCH RBC QN AUTO: 30.5 PG (ref 26.6–33)
MCHC RBC AUTO-ENTMCNC: 34.8 G/DL (ref 31.5–35.7)
MCV RBC AUTO: 87.7 FL (ref 79–97)
MONOCYTES # BLD AUTO: 0.63 10*3/MM3 (ref 0.1–0.9)
MONOCYTES NFR BLD AUTO: 10.3 % (ref 5–12)
NEUTROPHILS # BLD AUTO: 4.43 10*3/MM3 (ref 1.7–7)
NEUTROPHILS NFR BLD AUTO: 72.8 % (ref 42.7–76)
NRBC BLD AUTO-RTO: 0 /100 WBC (ref 0–0.2)
PLATELET # BLD AUTO: 211 10*3/MM3 (ref 140–450)
PMV BLD AUTO: 8.9 FL (ref 6–12)
POTASSIUM BLD-SCNC: 3.9 MMOL/L (ref 3.5–5.2)
PROT SERPL-MCNC: 6.8 G/DL (ref 6–8.5)
RBC # BLD AUTO: 4.39 10*6/MM3 (ref 4.14–5.8)
SODIUM BLD-SCNC: 125 MMOL/L (ref 136–145)
WBC NRBC COR # BLD: 6.09 10*3/MM3 (ref 3.4–10.8)

## 2019-08-30 PROCEDURE — 96360 HYDRATION IV INFUSION INIT: CPT

## 2019-08-30 PROCEDURE — 85025 COMPLETE CBC W/AUTO DIFF WBC: CPT | Performed by: EMERGENCY MEDICINE

## 2019-08-30 PROCEDURE — 80053 COMPREHEN METABOLIC PANEL: CPT | Performed by: EMERGENCY MEDICINE

## 2019-08-30 PROCEDURE — 70450 CT HEAD/BRAIN W/O DYE: CPT

## 2019-08-30 PROCEDURE — 99284 EMERGENCY DEPT VISIT MOD MDM: CPT

## 2019-08-30 RX ORDER — SODIUM CHLORIDE 0.9 % (FLUSH) 0.9 %
10 SYRINGE (ML) INJECTION AS NEEDED
Status: DISCONTINUED | OUTPATIENT
Start: 2019-08-30 | End: 2019-08-30 | Stop reason: HOSPADM

## 2019-08-30 RX ADMIN — SODIUM CHLORIDE 1000 ML: 9 INJECTION, SOLUTION INTRAVENOUS at 16:06

## 2019-09-07 ENCOUNTER — APPOINTMENT (OUTPATIENT)
Dept: CT IMAGING | Facility: HOSPITAL | Age: 49
End: 2019-09-07

## 2019-09-07 ENCOUNTER — HOSPITAL ENCOUNTER (EMERGENCY)
Facility: HOSPITAL | Age: 49
Discharge: HOME | End: 2019-09-07
Attending: EMERGENCY MEDICINE | Admitting: EMERGENCY MEDICINE

## 2019-09-07 VITALS
BODY MASS INDEX: 38.05 KG/M2 | WEIGHT: 228.4 LBS | HEIGHT: 65 IN | TEMPERATURE: 98.5 F | OXYGEN SATURATION: 95 % | RESPIRATION RATE: 16 BRPM | SYSTOLIC BLOOD PRESSURE: 133 MMHG | DIASTOLIC BLOOD PRESSURE: 87 MMHG | HEART RATE: 86 BPM

## 2019-09-07 DIAGNOSIS — G40.919 BREAKTHROUGH SEIZURE (HCC): Primary | ICD-10-CM

## 2019-09-07 LAB
ANION GAP SERPL CALCULATED.3IONS-SCNC: 12.2 MMOL/L (ref 5–15)
BASOPHILS # BLD AUTO: 0.04 10*3/MM3 (ref 0–0.2)
BASOPHILS NFR BLD AUTO: 0.7 % (ref 0–1.5)
BUN BLD-MCNC: 14 MG/DL (ref 6–20)
BUN/CREAT SERPL: 17.1 (ref 7–25)
CALCIUM SPEC-SCNC: 8.7 MG/DL (ref 8.6–10.5)
CHLORIDE SERPL-SCNC: 96 MMOL/L (ref 98–107)
CO2 SERPL-SCNC: 23.8 MMOL/L (ref 22–29)
CREAT BLD-MCNC: 0.82 MG/DL (ref 0.76–1.27)
DEPRECATED RDW RBC AUTO: 39.1 FL (ref 37–54)
EOSINOPHIL # BLD AUTO: 0.09 10*3/MM3 (ref 0–0.4)
EOSINOPHIL NFR BLD AUTO: 1.6 % (ref 0.3–6.2)
ERYTHROCYTE [DISTWIDTH] IN BLOOD BY AUTOMATED COUNT: 12.2 % (ref 12.3–15.4)
ETHANOL BLD-MCNC: <10 MG/DL (ref 0–10)
ETHANOL UR QL: <0.01 %
GFR SERPL CREATININE-BSD FRML MDRD: 100 ML/MIN/1.73
GLUCOSE BLD-MCNC: 98 MG/DL (ref 65–99)
HCT VFR BLD AUTO: 40.2 % (ref 37.5–51)
HGB BLD-MCNC: 14.2 G/DL (ref 13–17.7)
IMM GRANULOCYTES # BLD AUTO: 0.03 10*3/MM3 (ref 0–0.05)
IMM GRANULOCYTES NFR BLD AUTO: 0.5 % (ref 0–0.5)
LYMPHOCYTES # BLD AUTO: 0.94 10*3/MM3 (ref 0.7–3.1)
LYMPHOCYTES NFR BLD AUTO: 16.4 % (ref 19.6–45.3)
MCH RBC QN AUTO: 30.8 PG (ref 26.6–33)
MCHC RBC AUTO-ENTMCNC: 35.3 G/DL (ref 31.5–35.7)
MCV RBC AUTO: 87.2 FL (ref 79–97)
MONOCYTES # BLD AUTO: 0.54 10*3/MM3 (ref 0.1–0.9)
MONOCYTES NFR BLD AUTO: 9.4 % (ref 5–12)
NEUTROPHILS # BLD AUTO: 4.1 10*3/MM3 (ref 1.7–7)
NEUTROPHILS NFR BLD AUTO: 71.4 % (ref 42.7–76)
NRBC BLD AUTO-RTO: 0 /100 WBC (ref 0–0.2)
PLATELET # BLD AUTO: 236 10*3/MM3 (ref 140–450)
PMV BLD AUTO: 8.7 FL (ref 6–12)
POTASSIUM BLD-SCNC: 3.8 MMOL/L (ref 3.5–5.2)
RBC # BLD AUTO: 4.61 10*6/MM3 (ref 4.14–5.8)
SODIUM BLD-SCNC: 132 MMOL/L (ref 136–145)
WBC NRBC COR # BLD: 5.74 10*3/MM3 (ref 3.4–10.8)

## 2019-09-07 PROCEDURE — 85025 COMPLETE CBC W/AUTO DIFF WBC: CPT | Performed by: PHYSICIAN ASSISTANT

## 2019-09-07 PROCEDURE — 25010000003 LEVETIRACETAM IN NACL 0.75% 1000 MG/100ML SOLUTION: Performed by: PHYSICIAN ASSISTANT

## 2019-09-07 PROCEDURE — 70450 CT HEAD/BRAIN W/O DYE: CPT

## 2019-09-07 PROCEDURE — 70486 CT MAXILLOFACIAL W/O DYE: CPT

## 2019-09-07 PROCEDURE — 96365 THER/PROPH/DIAG IV INF INIT: CPT

## 2019-09-07 PROCEDURE — 80048 BASIC METABOLIC PNL TOTAL CA: CPT | Performed by: PHYSICIAN ASSISTANT

## 2019-09-07 PROCEDURE — 80307 DRUG TEST PRSMV CHEM ANLYZR: CPT | Performed by: PHYSICIAN ASSISTANT

## 2019-09-07 PROCEDURE — 96374 THER/PROPH/DIAG INJ IV PUSH: CPT

## 2019-09-07 PROCEDURE — 99284 EMERGENCY DEPT VISIT MOD MDM: CPT

## 2019-09-07 RX ORDER — LEVETIRACETAM 10 MG/ML
1000 INJECTION INTRAVASCULAR ONCE
Status: COMPLETED | OUTPATIENT
Start: 2019-09-07 | End: 2019-09-07

## 2019-09-07 RX ORDER — OXCARBAZEPINE 300 MG/1
300 TABLET, FILM COATED ORAL ONCE
Status: COMPLETED | OUTPATIENT
Start: 2019-09-07 | End: 2019-09-07

## 2019-09-07 RX ADMIN — LEVETIRACETAM 1000 MG: 10 INJECTION INTRAVENOUS at 13:54

## 2019-09-07 RX ADMIN — OXCARBAZEPINE 300 MG: 300 TABLET, FILM COATED ORAL at 16:32

## 2019-09-07 NOTE — ED TRIAGE NOTES
Pt was at store had seizure witnessed by bystanders, pt reports hx of seizures is on keppra has not taken medications yet today. Pt is awake at this time, appears fatigued.

## 2019-09-07 NOTE — ED PROVIDER NOTES
Pt with a hx of seizures presents to the ED c/o seizure earlier today. Pt reports his seizure was witnessed by bystanders who said he fell and seized for a few minutes. Pt reports being on Kepra and Trileptal for seizures, but has not taken it today.     On exam, sleeping but easily awakens, swelling to R side of face, heart regular rate and rhythm, lungs CTAB, cranal nerves 2-12 intact, no focal deficits.    Workup reviewed. I agree with the plan to discharge.           Attestation:    The HERBER and I have discussed this patient's history, physical exam, and treatment plan.  I have reviewed the documentation and personally had a face to face interaction with the patient. I affirm the documentation and agree with the treatment and plan.  The attached note describes my personal findings.    Documentation assistance provided by tejinder Mcwilliams for . Information recorded by the scribe was done at my direction and has been verified and validated by me.     Debby Mcwilliams  09/07/19 1634       Ariel Lozano MD  09/07/19 7212

## 2019-09-07 NOTE — ED PROVIDER NOTES
EMERGENCY DEPARTMENT ENCOUNTER    Room Number:  26/26  Date seen:  9/7/2019  Time seen: 1:27 PM  PCP: Bert Donis DO    HPI:  Chief complaint: seizure  Context:Valdemar Vo is a 49 y.o. male with hx of seizure who [place] the ED [place].  His seizure was witnessed by bystanders who reported the pt fell and that the seizure lasted a few minutes.  He takes Keppra but has not taken it today as he usually takes it at noon.  Pt confirms 6/10 HA and nausea.  He reports drinking 1-2 beers daily and states he is compliant with medications.    Onset: sudden  Location: neuro  Radiation: none  Duration: few minutes  Timing: constant  Character: seizure  Aggravating Factors: none  Alleviating Factors: none  Severity: moderate    MEDICAL RECORD REVIEW  Pt has been seen in hospital numerous times in the last 3 months due to seizures.  There were Concerns for alcohol use and noncompliance. Pt was seen in ED on 8/30/2019 by Dr. Schmitz for seizure and hyponatremia.  Dr. Schmitz tried to admit him but refused and pt left AMA.      ALLERGIES  Codeine    PAST MEDICAL HISTORY  Active Ambulatory Problems     Diagnosis Date Noted   • Status epilepticus (CMS/HCC) 06/22/2019   • Seizure (CMS/HCC) 07/29/2019   • Alcohol abuse 07/29/2019   • Head contusion 07/29/2019   • Suicidal ideation 08/07/2019   • History of traumatic head injury 10/26/2018   • Seizure disorder (CMS/HCC) 06/25/2019   • Major depressive disorder, recurrent episode, severe (CMS/HCC) 08/07/2019     Resolved Ambulatory Problems     Diagnosis Date Noted   • No Resolved Ambulatory Problems     Past Medical History:   Diagnosis Date   • Alcohol abuse 7/29/2019   • Depression    • Head contusion 7/29/2019   • History of traumatic head injury 10/26/2018   • Hypertension    • Migraine    • Pneumonia    • Seizures (CMS/HCC)    • Suicidal ideation 8/7/2019       PAST SURGICAL HISTORY  Past Surgical History:   Procedure  Laterality Date   • HERNIA REPAIR         FAMILY HISTORY  History reviewed. No pertinent family history.    SOCIAL HISTORY  Social History     Socioeconomic History   • Marital status: Single     Spouse name: Not on file   • Number of children: Not on file   • Years of education: Not on file   • Highest education level: Not on file   Tobacco Use   • Smoking status: Never Smoker   • Smokeless tobacco: Never Used   Substance and Sexual Activity   • Alcohol use: Yes     Alcohol/week: 3.6 oz     Types: 6 Cans of beer per week     Comment: on weekends   • Drug use: No   • Sexual activity: Yes       REVIEW OF SYSTEMS  Review of Systems   Constitutional: Negative for chills and fever.   Eyes: Negative.    Respiratory: Negative for shortness of breath.    Cardiovascular: Negative for chest pain.   Gastrointestinal: Positive for nausea. Negative for abdominal pain.   Genitourinary: Negative.    Musculoskeletal: Negative.    Skin: Negative.    Neurological: Positive for seizures and headaches ( 6/10).   Psychiatric/Behavioral: Negative.        PHYSICAL EXAM  ED Triage Vitals   Temp Heart Rate Resp BP SpO2   09/07/19 1325 09/07/19 1315 09/07/19 1315 09/07/19 1315 09/07/19 1315   98.5 °F (36.9 °C) 99 16 139/82 97 %      Temp src Heart Rate Source Patient Position BP Location FiO2 (%)   09/07/19 1325 -- -- -- --   Oral         Physical Exam   Constitutional: He is oriented to person, place, and time. No distress.   HENT:   Head: Normocephalic. Head is without raccoon's eyes and without Juan's sign.   Right Ear: No hemotympanum.   Left Ear: No hemotympanum.   Nose: No nasal septal hematoma.   Soft tissue edema and tenderness to R zygoma   Eyes: EOM are normal. Pupils are equal, round, and reactive to light.   Neck: Normal range of motion. No spinous process tenderness and no muscular tenderness present.   Cardiovascular: Normal rate and regular rhythm.   Pulmonary/Chest: Effort normal and breath sounds normal. No respiratory  distress. He exhibits no tenderness.   Abdominal: Soft. He exhibits no distension. There is no tenderness.   Musculoskeletal: Normal range of motion. He exhibits no edema.   extremities x4 are atraumatic and nontender    Neurological: He is alert and oriented to person, place, and time. He has normal sensation and normal strength. No cranial nerve deficit. GCS score is 15.   He is slightly confused about the details of his episode but otherwise neurologically intact   Skin: Skin is warm.   Nursing note and vitals reviewed.      LAB RESULTS  Recent Results (from the past 24 hour(s))   Basic Metabolic Panel    Collection Time: 09/07/19  1:56 PM   Result Value Ref Range    Glucose 98 65 - 99 mg/dL    BUN 14 6 - 20 mg/dL    Creatinine 0.82 0.76 - 1.27 mg/dL    Sodium 132 (L) 136 - 145 mmol/L    Potassium 3.8 3.5 - 5.2 mmol/L    Chloride 96 (L) 98 - 107 mmol/L    CO2 23.8 22.0 - 29.0 mmol/L    Calcium 8.7 8.6 - 10.5 mg/dL    eGFR Non African Amer 100 >60 mL/min/1.73    BUN/Creatinine Ratio 17.1 7.0 - 25.0    Anion Gap 12.2 5.0 - 15.0 mmol/L   CBC Auto Differential    Collection Time: 09/07/19  1:56 PM   Result Value Ref Range    WBC 5.74 3.40 - 10.80 10*3/mm3    RBC 4.61 4.14 - 5.80 10*6/mm3    Hemoglobin 14.2 13.0 - 17.7 g/dL    Hematocrit 40.2 37.5 - 51.0 %    MCV 87.2 79.0 - 97.0 fL    MCH 30.8 26.6 - 33.0 pg    MCHC 35.3 31.5 - 35.7 g/dL    RDW 12.2 (L) 12.3 - 15.4 %    RDW-SD 39.1 37.0 - 54.0 fl    MPV 8.7 6.0 - 12.0 fL    Platelets 236 140 - 450 10*3/mm3    Neutrophil % 71.4 42.7 - 76.0 %    Lymphocyte % 16.4 (L) 19.6 - 45.3 %    Monocyte % 9.4 5.0 - 12.0 %    Eosinophil % 1.6 0.3 - 6.2 %    Basophil % 0.7 0.0 - 1.5 %    Immature Grans % 0.5 0.0 - 0.5 %    Neutrophils, Absolute 4.10 1.70 - 7.00 10*3/mm3    Lymphocytes, Absolute 0.94 0.70 - 3.10 10*3/mm3    Monocytes, Absolute 0.54 0.10 - 0.90 10*3/mm3    Eosinophils, Absolute 0.09 0.00 - 0.40 10*3/mm3    Basophils, Absolute 0.04 0.00 - 0.20 10*3/mm3    Immature  Grans, Absolute 0.03 0.00 - 0.05 10*3/mm3    nRBC 0.0 0.0 - 0.2 /100 WBC   Ethanol    Collection Time: 09/07/19  1:56 PM   Result Value Ref Range    Ethanol <10 0 - 10 mg/dL    Ethanol % <0.010 %       I ordered the above labs and reviewed the results    RADIOLOGY  CT Head Without Contrast   Final Result   Soft tissue contusion along the right side of the face. No   acute posttraumatic deformity is identified in the face. There is dental   hardware in the left maxillary molar with a large dental caries and   periapical lucency around that tooth. That tooth appears split.       There is no intracranial abnormality.       I called the findings to Aga Altamirano in the emergency department at 3:22   PM.       This report was finalized on 9/7/2019 3:53 PM by Dr. Willie Lynch M.D.          CT Facial Bones Without Contrast   Final Result   Soft tissue contusion along the right side of the face. No   acute posttraumatic deformity is identified in the face. There is dental   hardware in the left maxillary molar with a large dental caries and   periapical lucency around that tooth. That tooth appears split.       There is no intracranial abnormality.       I called the findings to Aga Altamirano in the emergency department at 3:22   PM.       This report was finalized on 9/7/2019 3:53 PM by Dr. Willie Lynch M.D.              I ordered the above noted radiological studies and reviewed the images on the PACS system.  Spoke with Dr. Lynch regarding imaging results.    MEDICATIONS GIVEN IN ER  Medications   OXcarbazepine (TRILEPTAL) tablet 300 mg (not administered)   levETIRAcetam in NaCl 0.75% (KEPPRA) IVPB 1,000 mg (1,000 mg Intravenous New Bag 9/7/19 1354)       PROCEDURES  Procedures      PROGRESS AND CONSULTS    Progress Notes:       1350: Reviewed pt's history and workup with Dr. Blake MD.  After a bedside evaluation; Dr. Lozano agrees with the plan of care.    1525: Rechecked the patient who is resting comfortably  "and in NAD. Patient is stable. Informed the patient of imaging which showed no fractures.  Pt states that he drinks 1 beer after work most days but that he never has alcohol withdrawal seizures.  Pt states that he never misses Keppra or Trileptal doses and that he does not do any drugs. His normal doses administered in the ER.  He states he has plenty of these at home. Discussed the plan for discharge with instructions to f/u with neurology for further evaluation and management. Strict RTER warnings given. Pt understands and agrees with the plan, all questions answered.      Disposition vitals:  /78   Pulse 84   Temp 98.5 °F (36.9 °C) (Oral)   Resp 16   Ht 165.1 cm (65\")   Wt 104 kg (228 lb 6.4 oz)   SpO2 93%   BMI 38.01 kg/m²       DIAGNOSIS  Final diagnoses:   Breakthrough seizure (CMS/HCC)       FOLLOW UP   Bert Donis, DO  [address] [facility]  [address]  [facility] [id number] [phone]          Kevin Xie MD  [address] [facility]  [address] [place] [address]  [phone]      Neurologist      RX     Medication List      No changes were made to your prescriptions during this visit.           Documentation assistance provided by tejinder Capone for Carolina Altamirano PA-C.  Information recorded by the scribe was done at my direction and has been verified and validated by me.             Chun Capone  09/07/19 0557       Carolina Altamirano PA  09/08/19 1319    "

## 2019-09-07 NOTE — DISCHARGE INSTRUCTIONS
Do not drink alcohol at all.  Make sure you take your medications every day as prescribed and do not skip dosages.  Follow up with your neurologist for further evaluation of your seizures.  Return to the ER as needed.     It is against the law for you to drive until you are cleared by a neurologist. Do not drive or operate heavy machinery.  Take showers only (no baths), no swimming, and do not put yourself in situations that would cause you harm if you were to have a seizure.

## 2019-09-19 ENCOUNTER — HOSPITAL ENCOUNTER (OUTPATIENT)
Facility: HOSPITAL | Age: 49
Setting detail: OBSERVATION
Discharge: PSYCHIATRIC OR SUBSTANCE USE FACILITY | End: 2019-09-20
Attending: INTERNAL MEDICINE | Admitting: FAMILY MEDICINE

## 2019-09-19 DIAGNOSIS — R45.851 SUICIDAL IDEATION: Primary | ICD-10-CM

## 2019-09-19 DIAGNOSIS — F10.10 ALCOHOL ABUSE: ICD-10-CM

## 2019-09-19 DIAGNOSIS — E87.1 HYPONATREMIA: ICD-10-CM

## 2019-09-19 LAB
ALBUMIN SERPL-MCNC: 3.9 G/DL (ref 3.5–4.8)
ALBUMIN SERPL-MCNC: 4.1 G/DL (ref 3.5–4.8)
ALBUMIN/GLOB SERPL: 1.3 G/DL (ref 1–1.7)
ALBUMIN/GLOB SERPL: 1.4 G/DL (ref 1–1.7)
ALP SERPL-CCNC: 84 U/L (ref 32–91)
ALP SERPL-CCNC: 88 U/L (ref 32–91)
ALT SERPL W P-5'-P-CCNC: 15 U/L (ref 17–63)
ALT SERPL W P-5'-P-CCNC: 17 U/L (ref 17–63)
AMPHET+METHAMPHET UR QL: NEGATIVE
AMYLASE SERPL-CCNC: 50 U/L (ref 36–128)
ANION GAP SERPL CALCULATED.3IONS-SCNC: 14 MMOL/L (ref 5–15)
ANION GAP SERPL CALCULATED.3IONS-SCNC: 17.4 MMOL/L (ref 5–15)
ARTERIAL PATENCY WRIST A: POSITIVE
ARTICHOKE IGE QN: 96 MG/DL (ref 0–100)
AST SERPL-CCNC: 21 U/L (ref 15–41)
AST SERPL-CCNC: 22 U/L (ref 15–41)
ATMOSPHERIC PRESS: ABNORMAL MM[HG]
BACTERIA UR QL AUTO: NORMAL /HPF
BARBITURATES UR QL SCN: NEGATIVE
BASE EXCESS BLDA CALC-SCNC: -1.3 MMOL/L (ref 0–3)
BASOPHILS # BLD AUTO: 0 10*3/MM3 (ref 0–0.2)
BASOPHILS NFR BLD AUTO: 0.3 % (ref 0–1.5)
BDY SITE: ABNORMAL
BENZODIAZ UR QL SCN: NEGATIVE
BILIRUB SERPL-MCNC: 0.4 MG/DL (ref 0.3–1.2)
BILIRUB SERPL-MCNC: 0.6 MG/DL (ref 0.3–1.2)
BILIRUB UR QL STRIP: NEGATIVE
BILIRUB UR QL STRIP: NEGATIVE
BNP SERPL-MCNC: 17 PG/ML
BUN BLD-MCNC: 10 MG/DL (ref 8–20)
BUN BLD-MCNC: 7 MG/DL (ref 8–20)
BUN/CREAT SERPL: 12.5 (ref 6.2–20.3)
BUN/CREAT SERPL: 8.8 (ref 6.2–20.3)
CALCIUM SPEC-SCNC: 8 MG/DL (ref 8.9–10.3)
CALCIUM SPEC-SCNC: 8.1 MG/DL (ref 8.9–10.3)
CANNABINOIDS SERPL QL: NEGATIVE
CHLORIDE SERPL-SCNC: 88 MMOL/L (ref 101–111)
CHLORIDE SERPL-SCNC: 94 MMOL/L (ref 101–111)
CHOLEST SERPL-MCNC: 167 MG/DL
CK SERPL-CCNC: 115 U/L (ref 20–200)
CLARITY UR: CLEAR
CLARITY UR: CLEAR
CO2 BLDA-SCNC: 24.4 MMOL/L (ref 22–29)
CO2 SERPL-SCNC: 21 MMOL/L (ref 22–32)
CO2 SERPL-SCNC: 22 MMOL/L (ref 22–32)
COCAINE UR QL: POSITIVE
COLOR UR: YELLOW
COLOR UR: YELLOW
CREAT BLD-MCNC: 0.8 MG/DL (ref 0.7–1.2)
CREAT BLD-MCNC: 0.8 MG/DL (ref 0.7–1.2)
CREAT UR-MCNC: 49.9 MG/DL
CREAT UR-MCNC: 73.3 MG/DL
CREAT UR-MCNC: 73.4 MG/DL
CRP SERPL-MCNC: 0.04 MG/DL (ref 0–0.7)
D-LACTATE SERPL-SCNC: 0.8 MMOL/L (ref 0.5–2.2)
DEPRECATED RDW RBC AUTO: 40.7 FL (ref 37–54)
EOSINOPHIL # BLD AUTO: 0.1 10*3/MM3 (ref 0–0.4)
EOSINOPHIL NFR BLD AUTO: 2.5 % (ref 0.3–6.2)
ERYTHROCYTE [DISTWIDTH] IN BLOOD BY AUTOMATED COUNT: 13.1 % (ref 12.3–15.4)
ERYTHROCYTE [SEDIMENTATION RATE] IN BLOOD: 3 MM/HR (ref 0–15)
ETHANOL UR QL: 0.15 %
FERRITIN SERPL-MCNC: 87 NG/ML (ref 24–336)
FERRITIN SERPL-MCNC: 96 NG/ML (ref 24–336)
FOLATE SERPL-MCNC: >24.8 NG/ML (ref 5.9–24.8)
GFR SERPL CREATININE-BSD FRML MDRD: 103 ML/MIN/1.73
GFR SERPL CREATININE-BSD FRML MDRD: 103 ML/MIN/1.73
GLOBULIN UR ELPH-MCNC: 2.9 GM/DL (ref 2.5–3.8)
GLOBULIN UR ELPH-MCNC: 2.9 GM/DL (ref 2.5–3.8)
GLUCOSE BLD-MCNC: 103 MG/DL (ref 65–99)
GLUCOSE BLD-MCNC: 96 MG/DL (ref 65–99)
GLUCOSE UR STRIP-MCNC: NEGATIVE MG/DL
GLUCOSE UR STRIP-MCNC: NEGATIVE MG/DL
HBA1C MFR BLD: 5.1 % (ref 3.5–5.6)
HCO3 BLDA-SCNC: 23.3 MMOL/L (ref 21–28)
HCT VFR BLD AUTO: 39.2 % (ref 37.5–51)
HDLC SERPL QL: 2.69
HDLC SERPL-MCNC: 62 MG/DL
HEMODILUTION: NO
HGB BLD-MCNC: 13.9 G/DL (ref 13–17.7)
HGB UR QL STRIP.AUTO: ABNORMAL
HGB UR QL STRIP.AUTO: NEGATIVE
HOROWITZ INDEX BLD+IHG-RTO: 21 %
HYALINE CASTS UR QL AUTO: NORMAL /LPF
INR PPP: 1 (ref 0.9–1.1)
KETONES UR QL STRIP: ABNORMAL
KETONES UR QL STRIP: NEGATIVE
LDLC/HDLC SERPL: 1.43 {RATIO}
LEUKOCYTE ESTERASE UR QL STRIP.AUTO: NEGATIVE
LEUKOCYTE ESTERASE UR QL STRIP.AUTO: NEGATIVE
LIPASE SERPL-CCNC: 29 U/L (ref 22–51)
LYMPHOCYTES # BLD AUTO: 1.1 10*3/MM3 (ref 0.7–3.1)
LYMPHOCYTES NFR BLD AUTO: 20 % (ref 19.6–45.3)
MAGNESIUM SERPL-MCNC: 1.9 MG/DL (ref 1.8–2.5)
MAGNESIUM SERPL-MCNC: 2 MG/DL (ref 1.8–2.5)
MCH RBC QN AUTO: 31.2 PG (ref 26.6–33)
MCHC RBC AUTO-ENTMCNC: 35.6 G/DL (ref 31.5–35.7)
MCV RBC AUTO: 87.8 FL (ref 79–97)
METHADONE UR QL SCN: NEGATIVE
MODALITY: ABNORMAL
MONOCYTES # BLD AUTO: 0.4 10*3/MM3 (ref 0.1–0.9)
MONOCYTES NFR BLD AUTO: 7.3 % (ref 5–12)
NEUTROPHILS # BLD AUTO: 3.7 10*3/MM3 (ref 1.7–7)
NEUTROPHILS NFR BLD AUTO: 69.9 % (ref 42.7–76)
NITRITE UR QL STRIP: NEGATIVE
NITRITE UR QL STRIP: NEGATIVE
NRBC BLD AUTO-RTO: 0 /100 WBC (ref 0–0.2)
OPIATES UR QL: NEGATIVE
OSMOLALITY SERPL: 261 MOSM/KG (ref 280–300)
OSMOLALITY UR: 378 MOSM/KG (ref 300–800)
OSMOLALITY UR: 482 MOSM/KG (ref 300–800)
PCO2 BLDA: 37.9 MM HG (ref 35–48)
PCP UR QL SCN: NEGATIVE
PH BLDA: 7.4 PH UNITS (ref 7.35–7.45)
PH UR STRIP.AUTO: 5.5 [PH] (ref 5–8)
PH UR STRIP.AUTO: 7 [PH] (ref 5–8)
PHOSPHATE SERPL-MCNC: 3.4 MG/DL (ref 2.4–4.7)
PLATELET # BLD AUTO: 236 10*3/MM3 (ref 140–450)
PMV BLD AUTO: 6.6 FL (ref 6–12)
PO2 BLDA: 86.5 MM HG (ref 83–108)
POTASSIUM BLD-SCNC: 3.4 MMOL/L (ref 3.6–5.1)
POTASSIUM BLD-SCNC: 4 MMOL/L (ref 3.6–5.1)
PROCALCITONIN SERPL-MCNC: 0.06 NG/ML (ref 0–0.5)
PROT SERPL-MCNC: 6.8 G/DL (ref 6.1–7.9)
PROT SERPL-MCNC: 7 G/DL (ref 6.1–7.9)
PROT UR QL STRIP: NEGATIVE
PROT UR QL STRIP: NEGATIVE
PROTHROMBIN TIME: 10.3 SECONDS (ref 9.6–11.7)
RBC # BLD AUTO: 4.47 10*6/MM3 (ref 4.14–5.8)
RBC # UR: NORMAL /HPF
REF LAB TEST METHOD: NORMAL
SAO2 % BLDCOA: 96.6 % (ref 94–98)
SODIUM BLD-SCNC: 123 MMOL/L (ref 136–144)
SODIUM BLD-SCNC: 126 MMOL/L (ref 136–144)
SODIUM UR-SCNC: 69 MMOL/L
SODIUM UR-SCNC: 90 MMOL/L
SP GR UR STRIP: 1.01 (ref 1–1.03)
SP GR UR STRIP: 1.01 (ref 1–1.03)
SQUAMOUS #/AREA URNS HPF: NORMAL /HPF
T4 FREE SERPL-MCNC: 0.61 NG/DL (ref 0.58–1.64)
TRIGL SERPL-MCNC: 82 MG/DL
TROPONIN I SERPL-MCNC: <0.03 NG/ML (ref 0–0.03)
TSH SERPL DL<=0.05 MIU/L-ACNC: 1.44 UIU/ML (ref 0.34–5.6)
URATE SERPL-MCNC: 3.2 MG/DL (ref 4.8–8.7)
UROBILINOGEN UR QL STRIP: ABNORMAL
UROBILINOGEN UR QL STRIP: NORMAL
VIT B12 BLD-MCNC: 272 PG/ML (ref 180–914)
VLDLC SERPL-MCNC: 16.4 MG/DL
WBC NRBC COR # BLD: 5.3 10*3/MM3 (ref 3.4–10.8)
WBC UR QL AUTO: NORMAL /HPF

## 2019-09-19 PROCEDURE — 80307 DRUG TEST PRSMV CHEM ANLYZR: CPT | Performed by: EMERGENCY MEDICINE

## 2019-09-19 PROCEDURE — 83605 ASSAY OF LACTIC ACID: CPT | Performed by: INTERNAL MEDICINE

## 2019-09-19 PROCEDURE — 82550 ASSAY OF CK (CPK): CPT | Performed by: INTERNAL MEDICINE

## 2019-09-19 PROCEDURE — 99220 PR INITIAL OBSERVATION CARE/DAY 70 MINUTES: CPT | Performed by: INTERNAL MEDICINE

## 2019-09-19 PROCEDURE — 83735 ASSAY OF MAGNESIUM: CPT | Performed by: EMERGENCY MEDICINE

## 2019-09-19 PROCEDURE — 85652 RBC SED RATE AUTOMATED: CPT | Performed by: INTERNAL MEDICINE

## 2019-09-19 PROCEDURE — 82746 ASSAY OF FOLIC ACID SERUM: CPT | Performed by: INTERNAL MEDICINE

## 2019-09-19 PROCEDURE — 96365 THER/PROPH/DIAG IV INF INIT: CPT

## 2019-09-19 PROCEDURE — 84439 ASSAY OF FREE THYROXINE: CPT | Performed by: INTERNAL MEDICINE

## 2019-09-19 PROCEDURE — 36600 WITHDRAWAL OF ARTERIAL BLOOD: CPT

## 2019-09-19 PROCEDURE — 83735 ASSAY OF MAGNESIUM: CPT | Performed by: INTERNAL MEDICINE

## 2019-09-19 PROCEDURE — G0378 HOSPITAL OBSERVATION PER HR: HCPCS

## 2019-09-19 PROCEDURE — 96372 THER/PROPH/DIAG INJ SC/IM: CPT

## 2019-09-19 PROCEDURE — 83036 HEMOGLOBIN GLYCOSYLATED A1C: CPT | Performed by: INTERNAL MEDICINE

## 2019-09-19 PROCEDURE — 82803 BLOOD GASES ANY COMBINATION: CPT

## 2019-09-19 PROCEDURE — 81001 URINALYSIS AUTO W/SCOPE: CPT | Performed by: EMERGENCY MEDICINE

## 2019-09-19 PROCEDURE — 97162 PT EVAL MOD COMPLEX 30 MIN: CPT

## 2019-09-19 PROCEDURE — 84550 ASSAY OF BLOOD/URIC ACID: CPT | Performed by: INTERNAL MEDICINE

## 2019-09-19 PROCEDURE — 25010000002 THIAMINE PER 100 MG: Performed by: EMERGENCY MEDICINE

## 2019-09-19 PROCEDURE — 25010000002 ENOXAPARIN PER 10 MG: Performed by: INTERNAL MEDICINE

## 2019-09-19 PROCEDURE — 86140 C-REACTIVE PROTEIN: CPT | Performed by: INTERNAL MEDICINE

## 2019-09-19 PROCEDURE — 83880 ASSAY OF NATRIURETIC PEPTIDE: CPT | Performed by: INTERNAL MEDICINE

## 2019-09-19 PROCEDURE — 83935 ASSAY OF URINE OSMOLALITY: CPT | Performed by: INTERNAL MEDICINE

## 2019-09-19 PROCEDURE — 84300 ASSAY OF URINE SODIUM: CPT | Performed by: INTERNAL MEDICINE

## 2019-09-19 PROCEDURE — 80053 COMPREHEN METABOLIC PANEL: CPT | Performed by: INTERNAL MEDICINE

## 2019-09-19 PROCEDURE — 82570 ASSAY OF URINE CREATININE: CPT | Performed by: INTERNAL MEDICINE

## 2019-09-19 PROCEDURE — 84100 ASSAY OF PHOSPHORUS: CPT | Performed by: INTERNAL MEDICINE

## 2019-09-19 PROCEDURE — 99204 OFFICE O/P NEW MOD 45 MIN: CPT | Performed by: PSYCHIATRY & NEUROLOGY

## 2019-09-19 PROCEDURE — 84484 ASSAY OF TROPONIN QUANT: CPT | Performed by: INTERNAL MEDICINE

## 2019-09-19 PROCEDURE — 82570 ASSAY OF URINE CREATININE: CPT | Performed by: EMERGENCY MEDICINE

## 2019-09-19 PROCEDURE — 80061 LIPID PANEL: CPT | Performed by: INTERNAL MEDICINE

## 2019-09-19 PROCEDURE — 82607 VITAMIN B-12: CPT | Performed by: INTERNAL MEDICINE

## 2019-09-19 PROCEDURE — 84145 PROCALCITONIN (PCT): CPT | Performed by: INTERNAL MEDICINE

## 2019-09-19 PROCEDURE — 25010000002 MAGNESIUM SULFATE PER 500 MG OF MAGNESIUM: Performed by: EMERGENCY MEDICINE

## 2019-09-19 PROCEDURE — 81003 URINALYSIS AUTO W/O SCOPE: CPT | Performed by: INTERNAL MEDICINE

## 2019-09-19 PROCEDURE — 82150 ASSAY OF AMYLASE: CPT | Performed by: INTERNAL MEDICINE

## 2019-09-19 PROCEDURE — 85610 PROTHROMBIN TIME: CPT | Performed by: INTERNAL MEDICINE

## 2019-09-19 PROCEDURE — 80050 GENERAL HEALTH PANEL: CPT | Performed by: EMERGENCY MEDICINE

## 2019-09-19 PROCEDURE — 82728 ASSAY OF FERRITIN: CPT | Performed by: INTERNAL MEDICINE

## 2019-09-19 PROCEDURE — 83690 ASSAY OF LIPASE: CPT | Performed by: INTERNAL MEDICINE

## 2019-09-19 PROCEDURE — 83930 ASSAY OF BLOOD OSMOLALITY: CPT | Performed by: INTERNAL MEDICINE

## 2019-09-19 PROCEDURE — 99284 EMERGENCY DEPT VISIT MOD MDM: CPT

## 2019-09-19 RX ORDER — ACETAMINOPHEN 325 MG/1
650 TABLET ORAL EVERY 4 HOURS PRN
Status: DISCONTINUED | OUTPATIENT
Start: 2019-09-19 | End: 2019-09-20 | Stop reason: HOSPADM

## 2019-09-19 RX ORDER — NICOTINE 21 MG/24HR
1 PATCH, TRANSDERMAL 24 HOURS TRANSDERMAL EVERY 24 HOURS
Status: DISCONTINUED | OUTPATIENT
Start: 2019-09-19 | End: 2019-09-19 | Stop reason: SDUPTHER

## 2019-09-19 RX ORDER — SODIUM CHLORIDE 0.9 % (FLUSH) 0.9 %
10 SYRINGE (ML) INJECTION EVERY 12 HOURS SCHEDULED
Status: DISCONTINUED | OUTPATIENT
Start: 2019-09-19 | End: 2019-09-20 | Stop reason: HOSPADM

## 2019-09-19 RX ORDER — PANTOPRAZOLE SODIUM 40 MG/1
40 TABLET, DELAYED RELEASE ORAL
Status: DISCONTINUED | OUTPATIENT
Start: 2019-09-19 | End: 2019-09-20 | Stop reason: HOSPADM

## 2019-09-19 RX ORDER — LEVETIRACETAM 500 MG/1
500 TABLET ORAL 2 TIMES DAILY
Status: DISCONTINUED | OUTPATIENT
Start: 2019-09-19 | End: 2019-09-19

## 2019-09-19 RX ORDER — MELOXICAM 15 MG/1
7.5 TABLET ORAL DAILY
Status: DISCONTINUED | OUTPATIENT
Start: 2019-09-19 | End: 2019-09-19

## 2019-09-19 RX ORDER — ACETAMINOPHEN 650 MG/1
650 SUPPOSITORY RECTAL EVERY 4 HOURS PRN
Status: DISCONTINUED | OUTPATIENT
Start: 2019-09-19 | End: 2019-09-20 | Stop reason: HOSPADM

## 2019-09-19 RX ORDER — MELOXICAM 15 MG/1
7.5 TABLET ORAL DAILY
Status: DISCONTINUED | OUTPATIENT
Start: 2019-09-20 | End: 2019-09-20 | Stop reason: HOSPADM

## 2019-09-19 RX ORDER — ONDANSETRON 2 MG/ML
4 INJECTION INTRAMUSCULAR; INTRAVENOUS EVERY 6 HOURS PRN
Status: DISCONTINUED | OUTPATIENT
Start: 2019-09-19 | End: 2019-09-20 | Stop reason: HOSPADM

## 2019-09-19 RX ORDER — DOCUSATE SODIUM 100 MG/1
100 CAPSULE, LIQUID FILLED ORAL 2 TIMES DAILY PRN
Status: DISCONTINUED | OUTPATIENT
Start: 2019-09-19 | End: 2019-09-20 | Stop reason: HOSPADM

## 2019-09-19 RX ORDER — CHOLECALCIFEROL (VITAMIN D3) 125 MCG
5 CAPSULE ORAL NIGHTLY PRN
Status: DISCONTINUED | OUTPATIENT
Start: 2019-09-19 | End: 2019-09-20 | Stop reason: HOSPADM

## 2019-09-19 RX ORDER — POTASSIUM CHLORIDE 20 MEQ/1
40 TABLET, EXTENDED RELEASE ORAL ONCE
Status: COMPLETED | OUTPATIENT
Start: 2019-09-19 | End: 2019-09-19

## 2019-09-19 RX ORDER — FAMOTIDINE 20 MG/1
40 TABLET, FILM COATED ORAL DAILY
Status: DISCONTINUED | OUTPATIENT
Start: 2019-09-19 | End: 2019-09-20 | Stop reason: HOSPADM

## 2019-09-19 RX ORDER — FOLIC ACID 1 MG/1
1 TABLET ORAL DAILY
Status: DISCONTINUED | OUTPATIENT
Start: 2019-09-19 | End: 2019-09-20 | Stop reason: HOSPADM

## 2019-09-19 RX ORDER — THIAMINE HCL 100 MG
100 TABLET ORAL DAILY
Status: DISCONTINUED | OUTPATIENT
Start: 2019-09-19 | End: 2019-09-20 | Stop reason: HOSPADM

## 2019-09-19 RX ORDER — NICOTINE 21 MG/24HR
1 PATCH, TRANSDERMAL 24 HOURS TRANSDERMAL EVERY 24 HOURS
Status: DISCONTINUED | OUTPATIENT
Start: 2019-09-19 | End: 2019-09-19

## 2019-09-19 RX ORDER — NITROGLYCERIN 0.4 MG/1
0.4 TABLET SUBLINGUAL
Status: DISCONTINUED | OUTPATIENT
Start: 2019-09-19 | End: 2019-09-20 | Stop reason: HOSPADM

## 2019-09-19 RX ORDER — ACETAMINOPHEN 160 MG/5ML
650 SOLUTION ORAL EVERY 4 HOURS PRN
Status: DISCONTINUED | OUTPATIENT
Start: 2019-09-19 | End: 2019-09-20 | Stop reason: HOSPADM

## 2019-09-19 RX ORDER — SODIUM CHLORIDE 9 MG/ML
100 INJECTION, SOLUTION INTRAVENOUS CONTINUOUS
Status: DISCONTINUED | OUTPATIENT
Start: 2019-09-19 | End: 2019-09-19

## 2019-09-19 RX ORDER — LEVETIRACETAM 500 MG/1
500 TABLET ORAL 2 TIMES DAILY
Status: DISCONTINUED | OUTPATIENT
Start: 2019-09-19 | End: 2019-09-20 | Stop reason: HOSPADM

## 2019-09-19 RX ORDER — SODIUM CHLORIDE 0.9 % (FLUSH) 0.9 %
10 SYRINGE (ML) INJECTION AS NEEDED
Status: DISCONTINUED | OUTPATIENT
Start: 2019-09-19 | End: 2019-09-20 | Stop reason: HOSPADM

## 2019-09-19 RX ADMIN — PANTOPRAZOLE SODIUM 40 MG: 40 TABLET, DELAYED RELEASE ORAL at 05:34

## 2019-09-19 RX ADMIN — POTASSIUM CHLORIDE 40 MEQ: 1500 TABLET, EXTENDED RELEASE ORAL at 05:34

## 2019-09-19 RX ADMIN — ACETAMINOPHEN 650 MG: 325 TABLET ORAL at 09:39

## 2019-09-19 RX ADMIN — LEVETIRACETAM 500 MG: 500 TABLET, FILM COATED ORAL at 20:45

## 2019-09-19 RX ADMIN — FAMOTIDINE 40 MG: 20 TABLET, FILM COATED ORAL at 09:39

## 2019-09-19 RX ADMIN — FOLIC ACID 1 MG: 1 TABLET ORAL at 09:41

## 2019-09-19 RX ADMIN — Medication 10 ML: at 20:45

## 2019-09-19 RX ADMIN — Medication 10 ML: at 09:41

## 2019-09-19 RX ADMIN — FOLIC ACID 1000 ML/HR: 5 INJECTION, SOLUTION INTRAMUSCULAR; INTRAVENOUS; SUBCUTANEOUS at 04:53

## 2019-09-19 RX ADMIN — SODIUM CHLORIDE 100 ML/HR: 900 INJECTION, SOLUTION INTRAVENOUS at 09:40

## 2019-09-19 RX ADMIN — Medication 100 MG: at 09:40

## 2019-09-19 RX ADMIN — OXCARBAZEPINE 900 MG: 150 TABLET, FILM COATED ORAL at 20:45

## 2019-09-19 RX ADMIN — ENOXAPARIN SODIUM 40 MG: 40 INJECTION SUBCUTANEOUS at 17:30

## 2019-09-19 NOTE — THERAPY EVALUATION
Patient Name: Valdemar Vo  : 1970    MRN: [id number]                              Today's Date: 2019       Admit Date: 2019    Visit Dx:     ICD-10-CM ICD-9-CM   1. Suicidal ideation R45.851 V62.84   2. Alcohol abuse F10.10 305.00   3. Hyponatremia E87.1 276.1     Patient Active Problem List   Diagnosis   • Status epilepticus (CMS/HCC)   • Seizure (CMS/HCC)   • Alcohol abuse   • Head contusion   • Suicidal ideation   • History of traumatic head injury   • Seizure disorder (CMS/HCC)   • Major depressive disorder, recurrent episode, severe (CMS/HCC)     Past Medical History:   Diagnosis Date   • Alcohol abuse 2019   • Depression    • Head contusion 2019   • History of traumatic head injury 10/26/2018   • Hypertension    • Migraine    • Pneumonia    • Seizures (CMS/HCC)    • Suicidal ideation 2019     Past Surgical History:   Procedure Laterality Date   • HERNIA REPAIR       General Information     Row Name 19 1425          PT Evaluation Time/Intention    Document Type  evaluation  -CR     Mode of Treatment  physical therapy  -CR     Row Name 19 1425          General Information    Patient Profile Reviewed?  yes  -CR     Prior Level of Function  independent:;all household mobility;community mobility;driving;work  -CR     Existing Precautions/Restrictions  seizures  -CR     Row Name 19 1425          Relationship/Environment    Lives With  significant other  -CR     Row Name 19 1425          Resource/Environmental Concerns    Current Living Arrangements  home/apartment/condo  -CR     Row Name 19 1425          Home Main Entrance    Number of Stairs, Main Entrance  one  -CR     Row Name 19 1425          Cognitive Assessment/Intervention- PT/OT    Orientation Status (Cognition)  oriented x 3  -CR     Row Name 19 1425          Safety Issues, Functional Mobility    Safety Issues Affecting Function (Mobility)  judgment  -CR     Impairments Affecting  Function (Mobility)  balance;strength  -CR       User Key  (r) = Recorded By, (t) = Taken By, (c) = Cosigned By    Initials Name Provider Type    CR Reyes, Carmela, PT Physical Therapist        Mobility     Row Name 09/19/19 1425          Bed Mobility Assessment/Treatment    Bed Mobility Assessment/Treatment  bed mobility (all) activities  -CR     [place] Level (Bed Mobility)  supervision  -CR     Row Name 09/19/19 1425          Sit-Stand Transfer    Sit-Stand [place] (Transfers)  supervision  -CR     Row Name 09/19/19 1425          Gait/Stairs Assessment/Training    [place] Level (Gait)  minimum assist (75% patient effort)  -CR     Distance in Feet (Gait)  100  -CR     Deviations/Abnormal Patterns (Gait)  gait speed decreased;festinating/shuffling;antalgic  -CR       User Key  (r) = Recorded By, (t) = Taken By, (c) = Cosigned By    Initials Name Provider Type    CR Reyes, Carmela, PT Physical Therapist        Obj/Interventions     Row Name 09/19/19 1425          General ROM    GENERAL ROM COMMENTS  active BUe/LE wfl  -CR     Row Name 09/19/19 1425          MMT (Manual Muscle Testing)    General MMT Comments  grossly 4/5  -CR     Row Name 09/19/19 1425          Static Sitting Balance    Level of [place] (Unsupported Sitting, Static Balance)  independent  -CR     Sitting Position (Unsupported Sitting, Static Balance)  sitting on edge of bed  -CR     Row Name 09/19/19 1425          Static Standing Balance    Level of [place] (Supported Standing, Static Balance)  contact guard assist  -CR     Time Able to Maintain Position (Supported Standing, Static Balance)  2 to 3 minutes  -CR     Row Name 09/19/19 1425          Dynamic Standing Balance    Level of [place], Reaches Outside Midline (Standing, Dynamic Balance)  minimal assist, 75% patient effort  -CR     Time Able to Maintain Position, Reaches Outside Midline (Standing, Dynamic Balance)  1 to 2 minutes  -CR     Row Name 09/19/19 1425           Sensory Assessment/Intervention    Sensory General Assessment  no sensation deficits identified  -CR       User Key  (r) = Recorded By, (t) = Taken By, (c) = Cosigned By    Initials Name Provider Type    CR Reyes, Carmela, PT Physical Therapist        Goals/Plan     Row Name 09/19/19 1425          Gait Training Goal 1 (PT)    Activity/Assistive Device (Gait Training Goal 1, PT)  gait (walking locomotion);improve balance and speed;increase endurance/gait distance;decrease fall risk  -CR     Distance (Gait Goal 1, PT)  300  -CR     Time Frame (Gait Training Goal 1, PT)  1 week  -CR     Row Name 09/19/19 1425          Stairs Goal 1 (PT)    Activity/Assistive Device (Stairs Goal 1, PT)  stairs, all skills  -CR     [place] Level/Cues Needed (Stairs Goal 1, PT)  conditional independence  -CR     Time Frame (Stairs Goal 1, PT)  1 week  -CR       User Key  (r) = Recorded By, (t) = Taken By, (c) = Cosigned By    Initials Name Provider Type    CR Reyes, Carmela, PT Physical Therapist        Clinical Impression     Row Name 09/19/19 1425          Pain Assessment    Additional Documentation  Pain Scale: Numbers Pre/Post-Treatment (Group)  -CR     Row Name 09/19/19 1425          Pain Scale: Numbers Pre/Post-Treatment    Pain Scale: Numbers, Pretreatment  0/10 - no pain  -CR     Pain Scale: Numbers, Post-Treatment  0/10 - no pain  -CR     Row Name 09/19/19 1425          Plan of Care Review    Plan of Care Reviewed With  patient  -CR     Row Name 09/19/19 1425          Physical Therapy Clinical Impression    Criteria for Skilled Interventions Met (PT Clinical Impression)  yes;treatment indicated  -CR     Rehab Potential (PT Clinical Summary)  good, to achieve stated therapy goals  -CR     Row Name 09/19/19 1422          Positioning and Restraints    Pre-Treatment Position  in bed  -CR     Post Treatment Position  bed  -CR     In Bed  supine;notified nsg;call light within reach;exit alarm on;with other staff  -CR        User Key  (r) = Recorded By, (t) = Taken By, (c) = Cosigned By    Initials Name Provider Type    CR Reyes, Carmela, PT Physical Therapist        Outcome Measures    No documentation.       Physical Therapy Education     Title: PT OT SLP Therapies (Done)     Topic: Physical Therapy (Done)     Point: Mobility training (Done)     Learning Progress Summary           Patient Acceptance, E, VU by CR at 9/19/2019  2:51 PM                   Point: Body mechanics (Done)     Learning Progress Summary           Patient Acceptance, E, VU by CR at 9/19/2019  2:51 PM                               User Key     Initials Effective Dates Name Provider Type Discipline    CR 03/01/19 -  Reyes, Carmela, PT Physical Therapist PT              PT Recommendation and Plan  Planned Therapy Interventions (PT Eval): gait training, balance training, patient/family education, strengthening, stair training  Outcome Summary/Treatment Plan (PT)  Anticipated Discharge Disposition (PT): home  Plan of Care Reviewed With: patient  Outcome Summary: 48 y/o male brought in hospital due to seizures. PMH includes ETOH abuse. Pt able to transfer in/out of bed with supervision. Required min , HHA to ambulate 100 ft and noted slight limp. No reported LE tingling/numbness. Will need to follow up if pt needs a.d. prior to d/c but should be safe to be up with nsg/family.      Time Calculation:   PT Charges     Row Name 09/19/19 7213             Time Calculation    Start Time  1410  -CR      Stop Time  1425  -CR      Time Calculation (min)  15 min  -CR      PT Received On  09/19/19  -CR      PT - Next Appointment  09/20/19  -CR      PT Goal Re-Cert Due Date  10/03/19  -CR         Time Calculation- PT    Total Timed Code Minutes- PT  0 minute(s)  -CR        User Key  (r) = Recorded By, (t) = Taken By, (c) = Cosigned By    Initials Name Provider Type    CR Reyes, Carmela, PT Physical Therapist        Therapy Charges for Today     Code Description Service Date  Service Provider Modifiers Qty    [id number]  PT EVAL MOD COMPLEXITY 4 9/19/2019 Reyes, Carmela, PT GP 1               Carmela Reyes, PT  9/19/2019

## 2019-09-19 NOTE — ED PROVIDER NOTES
Subjective   Patient called EMS for seizure.  Per EMS, patient feigning seizure, told to stop, patient stopped.  Patient has been seen by me exhibiting similar behavior in the past.  Patient admits ETOH tonight and verbalizes SI but has no plan.  Patient denies any self harm.  On ROS, otherwise, patient c/o nontraumatic left lateral neck pain with movement, mild.            Review of Systems   Musculoskeletal: Positive for neck pain.   Neurological:        As per hpi   Psychiatric/Behavioral:        As per hpi   All other systems reviewed and are negative.      Past Medical History:   Diagnosis Date   • Alcohol abuse 7/29/2019   • Depression    • Head contusion 7/29/2019   • History of traumatic head injury 10/26/2018   • Hypertension    • Migraine    • Pneumonia    • Seizures (CMS/HCC)    • Suicidal ideation 8/7/2019       Allergies   Allergen Reactions   • Codeine Unknown (See Comments)     Pt is unsure       Past Surgical History:   Procedure Laterality Date   • HERNIA REPAIR         No family history on file.    Social History     Socioeconomic History   • Marital status: Single     Spouse name: Not on file   • Number of children: Not on file   • Years of education: Not on file   • Highest education level: Not on file   Tobacco Use   • Smoking status: Never Smoker   • Smokeless tobacco: Never Used   Substance and Sexual Activity   • Alcohol use: Yes     Alcohol/week: 3.6 oz     Types: 6 Cans of beer per week     Comment: on weekends   • Drug use: No   • Sexual activity: Yes           Objective   Physical Exam   Constitutional: He is oriented to person, place, and time. He appears well-developed and well-nourished.   HENT:   Head: Normocephalic and atraumatic.   Mouth/Throat: Oropharynx is clear and moist.   Eyes: Conjunctivae and EOM are normal. Pupils are equal, round, and reactive to light.   Neck:   Neck nontender, mild pain left lateral with FROM   Cardiovascular: Normal rate, regular rhythm, normal heart  sounds and intact distal pulses.   Pulmonary/Chest: Effort normal and breath sounds normal.   Abdominal: Soft. Bowel sounds are normal.   Musculoskeletal: Normal range of motion. He exhibits no edema or deformity.   Neurological: He is alert and oriented to person, place, and time. No cranial nerve deficit.   Motor and sensation intact   Skin: Skin is warm and dry. Capillary refill takes less than 2 seconds.   Psychiatric:   As per hpi       Procedures           ED Course                  MDM  Number of Diagnoses or Management Options  Alcohol abuse:   Hyponatremia:   Suicidal ideation:   Diagnosis management comments: Results for orders placed or performed during the hospital encounter of 09/19/19  -Comprehensive Metabolic Panel       Result                      Value             Ref Range           Glucose                     96                65 - 99 mg/dL       BUN                         10                8 - 20 mg/dL        Creatinine                  0.80              0.70 - 1.20 *       Sodium                      123 (L)           136 - 144 mm*       Potassium                   3.4 (L)           3.6 - 5.1 mm*       Chloride                    88 (L)            101 - 111 mm*       CO2                         21.0 (L)          22.0 - 32.0 *       Calcium                     8.1 (L)           8.9 - 10.3 m*       Total Protein               7.0               6.1 - 7.9 g/*       Albumin                     4.10              3.50 - 4.80 *       ALT (SGPT)                  17                17 - 63 U/L         AST (SGOT)                  21                15 - 41 U/L         Alkaline Phosphatase        88                32 - 91 U/L         Total Bilirubin             0.4               0.3 - 1.2 mg*       eGFR Non  Amer       103               >60 mL/min/1*       Globulin                    2.9               2.5 - 3.8 gm*       A/G Ratio                   1.4               1.0 - 1.7 g/*       BUN/Creatinine  Ratio        12.5              6.2 - 20.3          Anion Gap                   17.4 (H)          5.0 - 15.0 m*  -Magnesium       Result                      Value             Ref Range           Magnesium                   1.9               1.8 - 2.5 mg*  -Ethanol       Result                      Value             Ref Range           Ethanol %                   0.154 (H)         <=0.079 %        Will admit to correct sodium, place on hold given si, am consult to psychiatry       Amount and/or Complexity of Data Reviewed  Clinical lab tests: reviewed        Final diagnoses:   Suicidal ideation   Alcohol abuse   Hyponatremia              Willie Christensen MD  09/19/19 0442

## 2019-09-19 NOTE — H&P
"[facility] HOSPITALIST     Bert Donis, DO    CHIEF COMPLAINT:     Chief Complaint   Patient presents with   • Suicidal       HISTORY OF PRESENT ILLNESS:    HPI  Patient is a very poor historian and he does not give me much information.  He told me he would like to go home.  I reviewed the chart.    Patient has been to the ER several times with complaint of seizures and also today he called the paramedics as he said that he was having a seizure and when paramedics arrived he was told to stop having seizures and he stopped.  Patient is an alcoholic and he is alcohol level is high.  Patient now has suicidal ideation and he has depression.  Patient also is on several medications which I am not sure he is taking it or not.    Overall I am not able to get info.     FH Negative    Social positive for Alcohol.     Past Medical History:   Diagnosis Date   • Alcohol abuse 7/29/2019   • Depression    • Head contusion 7/29/2019   • History of traumatic head injury 10/26/2018   • Hypertension    • Migraine    • Pneumonia    • Seizures (CMS/HCC)    • Suicidal ideation 8/7/2019     Past Surgical History:   Procedure Laterality Date   • HERNIA REPAIR       No family history on file.  Social History     Tobacco Use   • Smoking status: Never Smoker   • Smokeless tobacco: Never Used   Substance Use Topics   • Alcohol use: Yes     Alcohol/week: 3.6 oz     Types: 6 Cans of beer per week     Comment: on weekends   • Drug use: No       (Not in a hospital admission)  Allergies:  Codeine      There is no immunization history on file for this patient.        REVIEW OF SYSTEMS:     Review of Systems   All other systems reviewed and are negative.      Vital Signs  Temp:  [98.5 °F (36.9 °C)] 98.5 °F (36.9 °C)  Heart Rate:  [107-114] 107  Resp:  [14-15] 15  BP: (122-133)/(79-86) 122/79    Flowsheet Rows      First Filed Value   Admission Height  165.1 cm (65\") Documented at 09/19/2019 0348   Admission Weight  100 kg " (220 lb 7.4 oz) Documented at 09/19/2019 0348           Physical Exam:    Physical Exam   Constitutional: He is oriented to person, place, and time. He appears well-developed and well-nourished. No distress.   HENT:   Head: Normocephalic and atraumatic.   Right Ear: External ear normal.   Left Ear: External ear normal.   Nose: Nose normal.   Mouth/Throat: Oropharynx is clear and moist. No oropharyngeal exudate.   Eyes: Conjunctivae and EOM are normal. Pupils are equal, round, and reactive to light. Right eye exhibits no discharge. Left eye exhibits no discharge. No scleral icterus.   Neck: Normal range of motion. No JVD present. No tracheal deviation present. No thyromegaly present.   Cardiovascular: Normal rate, regular rhythm, normal heart sounds and intact distal pulses. Exam reveals no gallop and no friction rub.   No murmur heard.  Pulmonary/Chest: Effort normal and breath sounds normal. No stridor. No respiratory distress. He has no wheezes. He has no rales. He exhibits no tenderness.   Abdominal: Soft. Bowel sounds are normal. He exhibits no distension and no mass. There is no tenderness. There is no rebound and no guarding. No hernia.   Musculoskeletal: Normal range of motion. He exhibits no edema, tenderness or deformity.   Lymphadenopathy:     He has no cervical adenopathy.   Neurological: He is alert and oriented to person, place, and time. No cranial nerve deficit or sensory deficit. He exhibits normal muscle tone. Coordination normal.   Skin: Skin is warm and dry. No rash noted. He is not diaphoretic. No erythema.   Psychiatric: He has a normal mood and affect. His behavior is normal.   Nursing note and vitals reviewed.      Emotional Behavior:   Blunted affect   Debilities:  Age appropriate      Results Review:    I reviewed the patient's new clinical results.  Lab Results (most recent)     Procedure Component Value Units Date/Time    Urinalysis With Microscopic If Indicated (No Culture) - Urine, Clean  Catch [[id number]]  (Abnormal) Collected:  09/19/19 0455    Specimen:  Urine, Clean Catch Updated:  09/19/19 0506     Color, UA Yellow     Appearance, UA Clear     pH, UA 5.5     Specific Gravity, UA 1.013     Glucose, UA Negative     Ketones, UA Trace     Bilirubin, UA Negative     Blood, UA Small (1+)     Protein, UA Negative     Leuk Esterase, UA Negative     Nitrite, UA Negative     Urobilinogen, UA 0.2 E.U./dL    Urinalysis, Microscopic Only - Urine, Clean Catch [[id number]] Collected:  09/19/19 0455    Specimen:  Urine, Clean Catch Updated:  09/19/19 0506     RBC, UA None Seen /HPF      WBC, UA None Seen /HPF      Bacteria, UA None Seen /HPF      Squamous Epithelial Cells, UA None Seen /HPF      Hyaline Casts, UA None Seen /LPF      Methodology Automated Microscopy    Urine Drug Screen - Urine, Clean Catch [[id number]] Collected:  09/19/19 0455    Specimen:  Urine, Clean Catch Updated:  09/19/19 0458    Comprehensive Metabolic Panel [[id number]]  (Abnormal) Collected:  09/19/19 0406    Specimen:  Blood Updated:  09/19/19 0434     Glucose 96 mg/dL      BUN 10 mg/dL      Creatinine 0.80 mg/dL      Sodium 123 mmol/L      Potassium 3.4 mmol/L      Chloride 88 mmol/L      CO2 21.0 mmol/L      Calcium 8.1 mg/dL      Total Protein 7.0 g/dL      Albumin 4.10 g/dL      ALT (SGPT) 17 U/L      AST (SGOT) 21 U/L      Alkaline Phosphatase 88 U/L      Total Bilirubin 0.4 mg/dL      eGFR Non African Amer 103 mL/min/1.73      Globulin 2.9 gm/dL      A/G Ratio 1.4 g/dL      BUN/Creatinine Ratio 12.5     Anion Gap 17.4 mmol/L     Magnesium [[id number]]  (Normal) Collected:  09/19/19 0406    Specimen:  Blood Updated:  09/19/19 0434     Magnesium 1.9 mg/dL     Ethanol [[id number]]  (Abnormal) Collected:  09/19/19 0406    Specimen:  Blood Updated:  09/19/19 0434     Ethanol % 0.154 %     Narrative:       Plasma Ethanol Clinical Symptoms:    ETOH (%)               Clinical Symptom  .01-.05              No apparent influence  .03-.12               Euphoria, Diminished judgment and attention   .09-.25              Impaired comprehension, Muscle incoordination  .18-.30              Confusion, Staggered gait, Slurred speech  .25-.40              Markedly decreased response to stimuli, unable to stand or                        walk, vomitting, sleep or stupor  .35-.50              Comatose, Anesthesia, Subnormal body temperature            Imaging Results (most recent)     None        reviewed    ECG/EMG Results (most recent)     None        reviewed              CT Facial Bones Without Contrast  Narrative: CT HEAD AND FACE WITHOUT CONTRAST     HISTORY: Seizure. Fall with swelling along the right side of the face.     TECHNIQUE: Axial CT scans of the head and face are provided and are  correlated with head CT scans 08/30/2019 and 06/22/2019. Multiplanar  reformatted images were produced.     Radiation dose reduction techniques were utilized, including automated  exposure control and exposure modulation based on body size.     FINDINGS HEAD CT: The ventricles are normal in caliber. The brain  parenchyma, extra-axial spaces, and the bones of the skull appear  normal. Orbital structures appear normal. Paranasal sinuses are clear.     FINDINGS FACE CT: There is screw and plate hardware along the left  infraorbital rim and along the anterior edge of the left zygomatic arch  and lateral orbital rim. There are old healed fractures. Subcutaneous  edema is observed mildly superficial and caudal to the right zygomatic  arch. There is no acute facial fracture. Mandible is intact. Mucosal  thickening is observed in the left maxillary sinus and there is a large  lytic defect and the remaining left maxillary molar which contains some  radiopaque dental debris but there is a large central lucent defect and  that tooth appears split. The lucent defect extends through the tooth  into the maxillary sinus cavity. There is loss of bone around the apex  of that tooth. The  visualized upper cervical spine appears normal except  for degenerative change between the odontoid and the C1 ring. The  visualized airway is patent.     Impression: Soft tissue contusion along the right side of the face. No  acute posttraumatic deformity is identified in the face. There is dental  hardware in the left maxillary molar with a large dental caries and  periapical lucency around that tooth. That tooth appears split.     There is no intracranial abnormality.     I called the findings to Aga Altamirano in the emergency department at 3:22  PM.     This report was finalized on 9/7/2019 3:53 PM by Dr. Willie Lynch M.D.     CT Head Without Contrast  Narrative: CT HEAD AND FACE WITHOUT CONTRAST     HISTORY: Seizure. Fall with swelling along the right side of the face.     TECHNIQUE: Axial CT scans of the head and face are provided and are  correlated with head CT scans 08/30/2019 and 06/22/2019. Multiplanar  reformatted images were produced.     Radiation dose reduction techniques were utilized, including automated  exposure control and exposure modulation based on body size.     FINDINGS HEAD CT: The ventricles are normal in caliber. The brain  parenchyma, extra-axial spaces, and the bones of the skull appear  normal. Orbital structures appear normal. Paranasal sinuses are clear.     FINDINGS FACE CT: There is screw and plate hardware along the left  infraorbital rim and along the anterior edge of the left zygomatic arch  and lateral orbital rim. There are old healed fractures. Subcutaneous  edema is observed mildly superficial and caudal to the right zygomatic  arch. There is no acute facial fracture. Mandible is intact. Mucosal  thickening is observed in the left maxillary sinus and there is a large  lytic defect and the remaining left maxillary molar which contains some  radiopaque dental debris but there is a large central lucent defect and  that tooth appears split. The lucent defect extends through the  tooth  into the maxillary sinus cavity. There is loss of bone around the apex  of that tooth. The visualized upper cervical spine appears normal except  for degenerative change between the odontoid and the C1 ring. The  visualized airway is patent.     Impression: Soft tissue contusion along the right side of the face. No  acute posttraumatic deformity is identified in the face. There is dental  hardware in the left maxillary molar with a large dental caries and  periapical lucency around that tooth. That tooth appears split.     There is no intracranial abnormality.     I called the findings to Aga Altamirano in the emergency department at 3:22  PM.     This report was finalized on 9/7/2019 3:53 PM by Dr. Willie Lynch M.D.         Assessment/Plan       Suicidal ideation    Severe hyponatremia and patient reports seizure although we are not sure whether he was having seizure or not.  He has history of seizure disorder.  He drinks alcohol and most likely his hyponatremia is from alcoholism.  We will correct his sodium slowly and will also obtain nephrology for help with sodium correction.    Depression with suicidal ideation and patient will be seen by psych and he may need placement.    Alcoholism and patient will be on thiamine/folic acid as well as IV fluids.  We will continue his regular diet.  Patient does not seem to be in withdrawal but if needed patient may need to be started on some benzodiazepines including Librium.    Seizure disorder patient is on Keppra and also on Trileptal and will continue those.    Depression patient is on Zoloft and will be continued.  Psych will be seeing the patient    Osteoarthritis patient is on meloxicam 7.5 g p.o. daily and patient is at risk for peptic ulcer disease so I will add Protonix to the patient drug regimen.    DVT prophylaxis Lovenox.    I discussed the patients findings and my recommendations with patient.     Wu Escobedo MD  09/19/19  5:12 AM

## 2019-09-19 NOTE — PLAN OF CARE
Problem: Patient Care Overview  Goal: Plan of Care Review  Outcome: Ongoing (interventions implemented as appropriate)    Goal: Individualization and Mutuality  Outcome: Ongoing (interventions implemented as appropriate)    Goal: Discharge Needs Assessment  Outcome: Ongoing (interventions implemented as appropriate)    Goal: Interprofessional Rounds/Family Conf  Outcome: Ongoing (interventions implemented as appropriate)   09/19/19 1405   Interdisciplinary Rounds/Family Conf   Summary Psych consulted [facility]. Yet to see. Nephrology following. Pt w/ no current suicidal ideation.    Participants ;nursing;pharmacy;social work/services       Problem: Suicide Risk (Adult)  Goal: Identify Related Risk Factors and Signs and Symptoms  Outcome: Outcome(s) achieved Date Met: 09/19/19 09/19/19 1405   Suicide Risk (Adult)   Related Risk Factors (Suicide Risk) co-occurring disorders;family mental health history;mental health diagnosis;multiple stressors;stressful life event;substance use;witness to violence   Signs and Symptoms (Suicide Risk) expressed hopelessness;grief/loss;introversion;withdrawal     Goal: Strength-Based Wellness/Recovery  Outcome: Ongoing (interventions implemented as appropriate)   09/19/19 1405   Suicide Risk (Adult)   Strength-Based Wellness/Recovery making progress toward outcome     Goal: Physical Safety  Outcome: Ongoing (interventions implemented as appropriate)   09/19/19 1405   Suicide Risk (Adult)   Physical Safety making progress toward outcome       Problem: Fall Risk (Adult)  Goal: Identify Related Risk Factors and Signs and Symptoms  Outcome: Outcome(s) achieved Date Met: 09/19/19 09/19/19 1405   Fall Risk (Adult)   Related Risk Factors (Fall Risk) depression/anxiety;polypharmacy;sensory deficits;environment unfamiliar   Signs and Symptoms (Fall Risk) presence of risk factors     Goal: Absence of Fall  Outcome: Ongoing (interventions implemented as appropriate)   09/19/19 1405    Fall Risk (Adult)   Absence of Fall making progress toward outcome       Problem: Skin Injury Risk (Adult)  Goal: Identify Related Risk Factors and Signs and Symptoms  Outcome: Outcome(s) achieved Date Met: 09/19/19 09/19/19 1405   Skin Injury Risk (Adult)   Related Risk Factors (Skin Injury Risk) nutritional deficiencies;mechanical forces     Goal: Skin Health and Integrity  Outcome: Ongoing (interventions implemented as appropriate)   09/19/19 1405   Skin Injury Risk (Adult)   Skin Health and Integrity making progress toward outcome

## 2019-09-19 NOTE — PROGRESS NOTES
Continued Stay Note   Rich     Patient Name: Valdemar Vo  MRN: [id number]  Today's Date: 2019    Admit Date: 2019    Discharge Plan     Row Name 19 1657       Plan    Plan  DC Plan: [facility] for inpt psych stabailization when medically ready to d/c. On 24 hour hold (which expires  @ 044&), see below about not pursuing NATHAN at this time.     Plan Comments  Pt was seen by psych MD (Dr. Olsen). Per MD, pt is frequently making statements he wants to die. Spouse feels pt needs to be admitted. MD hesitatn to put pt on hold d/t history of extensive incarceration. Spouse is coming this evening & will try to convince pt of need for treatment. Informed when hold expires, & MD stated to allow 24 hour hold to  as pt won't leave at 5am. Psych will see on  & keep SW updated on d/c plan. At this time - plan is [facility] when medically ready to d/c. No NATHAN will be pursued at this time.      VICKIE Merino    Phone: [phone]  Cell: [phone]  Fax: [phone]  [email or web address]

## 2019-09-19 NOTE — CONSULTS
"Adult Nutrition  Assessment/PES    Patient Name:  Valdemar Vo  YOB: 1970  MRN: [id number]  Admit Date:  9/19/2019    Assessment Date:  9/19/2019    Comments:  Ordering boost plus daily to promote kcal/protein intakes. Can d/c once oral intakes are adequate.     Reason for Assessment     Row Name 09/19/19 1422          Reason for Assessment    Reason For Assessment  MST =2, chronic poor intake consults     Diagnosis  PMH: ETOH abuse, seizure, depression, HTN         Current: admit c seizure and severe hyponatremia         Nutrition/Diet History     Row Name 09/19/19 1423          Nutrition/Diet History    Typical Food/Fluid Intake 9/19: Patient very drowsy at visit d/t eventful night. 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      Food Allergies  NKA to food         Anthropometrics     Row Name 09/19/19 1425       Anthropometrics    Height  165.1 cm (65\")    Weight  102 kg (224 lb)  9/19/19 9/19/19       Admit Weight    Admit Weight  99.8 kg (220 lb)       Ideal Body Weight (IBW)    Ideal Body Weight (IBW) (kg)  62.51    % Ideal Body Weight  162.54       Usual Body Weight (UBW)    Usual Body Weight  UTD - patient sleepy    Weight History  230# (8/7/19); 234# (7/19); 227# (6/19)       Body Mass Index (BMI)    BMI (kg/m2)  37.35        Labs/Tests/Procedures/Meds     Row Name 09/19/19 1428          Labs/Procedures/Meds    Lab Results Reviewed  reviewed, pertinent     Lab Results Comments  Na+ 123 L K+ 3.4 L glucose 96 BUN/crt wnl Mg wnl        Medications    Pertinent Medications Reviewed  reviewed, pertinent     Pertinent Medications Comments  Pepcid, folic acid, keppra, protonix, thiamine         Physical Findings     Row Name 09/19/19 1426          Physical Findings    Overall Physical Appearance  -- Patient appears well nourished     Gastrointestinal  -- no BM x1 day     Tubes  -- N/A     Oral/Mouth Cavity  -- N/A     Skin " " -- skin intact         Estimated/Assessed Needs     Row Name 09/19/19 1427 09/19/19 1425       Calculation Measurements    Weight Used For Calculations  102 kg (224 lb)  --    Height  165.1 cm (65\") -       Estimated/Assessed Needs    Additional Documentation   --       KCAL/KG    KCAL/KG   --    25 Kcal/Kg (kcal)   --       Protein Requirements    Est Protein Requirement Amount (gms/kg)   --    Estimated Protein Requirements (gms/day)   --       Fluid Requirements    Estimated Fluid Requirements (mL/day)   --    RDA Method (mL)   --    Yessy-Lynne Method (over 20 kg)   --        Nutrition Prescription Ordered     Row Name 09/19/19 1427          Nutrition Prescription PO    Current PO Diet  Regular, [id number]        Nutrition Prescription EN    Enteral Route  -- -     Product  -- -     TF Delivery Method -     Continuous TF Goal Rate (mL/hr)  -- -     Water flush (mL)   -- -     Water Flush Frequency  -- -         Evaluation of Received Nutrient/Fluid Intake     Row Name 09/19/19 1427          PO Evaluation    % PO Intake 9/19: no intakes recorded. Per sitter patient has only taken bites at breakfast today.        EN Evaluation    TF Changes  -- -     TF Residual  -- -     TF Tolerance  -- -               Problem/Interventions:  Problem 1     Row Name 09/19/19 1428          Nutrition Diagnoses Problem 1    Problem 1 Inadequate oral intake     Etiology (related to) Decreased ability to consume sufficient energy     Signs/Symptoms (evidenced by) Estimates of insufficient intake of energy from diet when compared to requirements (no intakes/bites)                 Intervention Goal     Row Name 09/19/19 1428          Intervention Goal    PO  PO intake (%)     PO Intake %  >/= 65 % intake at meals          Nutrition Intervention     Row Name 09/19/19 1428          Nutrition Intervention    RD/Tech Action Ordering boost plus daily to promote kcal/protein intakes         Nutrition Prescription     Row Name 09/19/19 1428    "       Nutrition Prescription PO    PO Prescription Regular, [id number] + boost plus daily        Nutrition Prescription EN    Enteral Prescription  -- -         Education/Evaluation     Row Name 09/19/19 1429          Monitor/Evaluation    Monitor  PO intake, labs, BM           Electronically signed by:  Marie Prasad, CARLITOS  09/19/19 2:29 PM
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
## 2019-09-19 NOTE — PLAN OF CARE
Problem: Patient Care Overview  Goal: Plan of Care Review  Outcome: Ongoing (interventions implemented as appropriate)   09/19/19 2595   Coping/Psychosocial   Plan of Care Reviewed With patient   OTHER   Outcome Summary 48 y/o male brought in hospital due to seizures. PMH includes ETOH abuse. Pt able to transfer in/out of bed with supervision. Required min , HHA to ambulate 100 ft and noted slight limp. No reported LE tingling/numbness. Will need to follow up if pt needs a.d. prior to d/c but should be safe to be up with nsg/family.

## 2019-09-19 NOTE — PROGRESS NOTES
Continued Stay Note  ROMELIA Villanueva     Patient Name: Valdemar Vo  MRN: [id number]  Today's Date: 9/19/2019    Admit Date: 9/19/2019    Discharge Plan     Row Name 09/19/19 1400       Plan    Plan  Per chart review: Anticipate routine home.                Zoë Cristobal RN

## 2019-09-20 VITALS
HEIGHT: 65 IN | WEIGHT: 220.9 LBS | OXYGEN SATURATION: 100 % | HEART RATE: 99 BPM | BODY MASS INDEX: 36.8 KG/M2 | SYSTOLIC BLOOD PRESSURE: 166 MMHG | DIASTOLIC BLOOD PRESSURE: 80 MMHG | TEMPERATURE: 98.7 F | RESPIRATION RATE: 14 BRPM

## 2019-09-20 LAB
ANION GAP SERPL CALCULATED.3IONS-SCNC: 11.1 MMOL/L (ref 5–15)
BASOPHILS # BLD AUTO: 0 10*3/MM3 (ref 0–0.2)
BASOPHILS NFR BLD AUTO: 0.8 % (ref 0–1.5)
BNP SERPL-MCNC: 23 PG/ML
BUN BLD-MCNC: 10 MG/DL (ref 8–20)
BUN/CREAT SERPL: 12.5 (ref 6.2–20.3)
CALCIUM SPEC-SCNC: 8.2 MG/DL (ref 8.9–10.3)
CHLORIDE SERPL-SCNC: 99 MMOL/L (ref 101–111)
CO2 SERPL-SCNC: 24 MMOL/L (ref 22–32)
CREAT BLD-MCNC: 0.8 MG/DL (ref 0.7–1.2)
DEPRECATED RDW RBC AUTO: 41.6 FL (ref 37–54)
EOSINOPHIL # BLD AUTO: 0.2 10*3/MM3 (ref 0–0.4)
EOSINOPHIL NFR BLD AUTO: 3.4 % (ref 0.3–6.2)
ERYTHROCYTE [DISTWIDTH] IN BLOOD BY AUTOMATED COUNT: 13.2 % (ref 12.3–15.4)
GFR SERPL CREATININE-BSD FRML MDRD: 103 ML/MIN/1.73
GLUCOSE BLD-MCNC: 90 MG/DL (ref 65–99)
HCT VFR BLD AUTO: 40.6 % (ref 37.5–51)
HGB BLD-MCNC: 14.1 G/DL (ref 13–17.7)
LYMPHOCYTES # BLD AUTO: 1.3 10*3/MM3 (ref 0.7–3.1)
LYMPHOCYTES NFR BLD AUTO: 23.2 % (ref 19.6–45.3)
MCH RBC QN AUTO: 30.6 PG (ref 26.6–33)
MCHC RBC AUTO-ENTMCNC: 34.7 G/DL (ref 31.5–35.7)
MCV RBC AUTO: 88.2 FL (ref 79–97)
MONOCYTES # BLD AUTO: 0.5 10*3/MM3 (ref 0.1–0.9)
MONOCYTES NFR BLD AUTO: 9.6 % (ref 5–12)
NEUTROPHILS # BLD AUTO: 3.5 10*3/MM3 (ref 1.7–7)
NEUTROPHILS NFR BLD AUTO: 63 % (ref 42.7–76)
NRBC BLD AUTO-RTO: 0 /100 WBC (ref 0–0.2)
PLATELET # BLD AUTO: 225 10*3/MM3 (ref 140–450)
PMV BLD AUTO: 6.8 FL (ref 6–12)
POTASSIUM BLD-SCNC: 4.1 MMOL/L (ref 3.6–5.1)
RBC # BLD AUTO: 4.6 10*6/MM3 (ref 4.14–5.8)
SODIUM BLD-SCNC: 130 MMOL/L (ref 136–144)
URATE SERPL-MCNC: 3.2 MG/DL (ref 4.8–8.7)
WBC NRBC COR # BLD: 5.5 10*3/MM3 (ref 3.4–10.8)

## 2019-09-20 PROCEDURE — 97116 GAIT TRAINING THERAPY: CPT

## 2019-09-20 PROCEDURE — 85025 COMPLETE CBC W/AUTO DIFF WBC: CPT | Performed by: INTERNAL MEDICINE

## 2019-09-20 PROCEDURE — 83880 ASSAY OF NATRIURETIC PEPTIDE: CPT | Performed by: INTERNAL MEDICINE

## 2019-09-20 PROCEDURE — G0378 HOSPITAL OBSERVATION PER HR: HCPCS

## 2019-09-20 PROCEDURE — 99213 OFFICE O/P EST LOW 20 MIN: CPT | Performed by: PSYCHIATRY & NEUROLOGY

## 2019-09-20 PROCEDURE — 99217 PR OBSERVATION CARE DISCHARGE MANAGEMENT: CPT | Performed by: FAMILY MEDICINE

## 2019-09-20 PROCEDURE — 84550 ASSAY OF BLOOD/URIC ACID: CPT | Performed by: INTERNAL MEDICINE

## 2019-09-20 PROCEDURE — 80048 BASIC METABOLIC PNL TOTAL CA: CPT | Performed by: INTERNAL MEDICINE

## 2019-09-20 RX ADMIN — FOLIC ACID 1 MG: 1 TABLET ORAL at 09:37

## 2019-09-20 RX ADMIN — FAMOTIDINE 40 MG: 20 TABLET, FILM COATED ORAL at 09:36

## 2019-09-20 RX ADMIN — LEVETIRACETAM 500 MG: 500 TABLET, FILM COATED ORAL at 20:32

## 2019-09-20 RX ADMIN — SERTRALINE HYDROCHLORIDE 75 MG: 50 TABLET ORAL at 09:41

## 2019-09-20 RX ADMIN — LEVETIRACETAM 500 MG: 500 TABLET, FILM COATED ORAL at 09:37

## 2019-09-20 RX ADMIN — OXCARBAZEPINE 900 MG: 150 TABLET, FILM COATED ORAL at 20:32

## 2019-09-20 RX ADMIN — PANTOPRAZOLE SODIUM 40 MG: 40 TABLET, DELAYED RELEASE ORAL at 05:56

## 2019-09-20 RX ADMIN — Medication 100 MG: at 09:41

## 2019-09-20 RX ADMIN — Medication 10 ML: at 09:43

## 2019-09-20 RX ADMIN — MELOXICAM 7.5 MG: 15 TABLET ORAL at 09:39

## 2019-09-20 RX ADMIN — OXCARBAZEPINE 900 MG: 150 TABLET, FILM COATED ORAL at 09:37
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
## 2019-09-20 NOTE — PROGRESS NOTES
"Continued Stay Note  [facility]     Patient Name: Valdemar Vo  MRN: [id number]  Today's Date: 9/20/2019    Admit Date: 9/19/2019        Row Name 09/20/19 1427       Plan    Plan  DC Plan: [facility] 9/20 for inpt psych stabilization. Call report to [phone], admissions department. NATHAN faxed ([phone]) to [facility] 9/20 @ 1415.     Plan Comments  SW received call from [facility] mobile  (Chary) who reported MD (Dr. Vieira) is recommending inpt psych & pt is declining to provide consent, requested NATHAN be completed. Informed MD (Pretty- psych) & MD (Gerson- hospitalist) of plan to move forward w/ NATHAN. Had physician statement completed & faxed ([phone]) NATHAN request to 's office - attn: Maura. Called 's office ([phone]) to inform of NATHAN request. SW was informed that pt wanted to talk about the situation w/ someone as he felt \"we were making him do something against his will\" after he spoke w/ the [facility] . SW talked w/ pt at bedside, who continued to get upset, stating he has rights & at one point removed his IV. SW talked w/ pt until he de-escalated some, then left room.  Notified charge RN that he pulled out IV in his hand. Discussed case w/ RN. Received signed NATHAN back time stamped effective for 9/20 @ 1410pm (expires 9/25/19 @ 1410). Called charge RN to informed & faxed to unit for hard chart. Faxed NATHAN to [facility] & called to confirm receipt. Notified RN where to call for report & was informed security was being called as pt was wanting to leave facility AMA. Requested MD (Gerson) put in \"Legal Status 72 hour hold.\"     VICKIE Merino    Phone: [phone]  Cell: [phone]  Fax: [phone]  [email or web address]       "

## 2019-09-20 NOTE — PLAN OF CARE
Problem: Patient Care Overview  Goal: Plan of Care Review  Outcome: Ongoing (interventions implemented as appropriate)   09/20/19 1420   Coping/Psychosocial   Plan of Care Reviewed With patient  (Pt angry about Court order that was obtained for 72 hr hold.)     Goal: Individualization and Mutuality  Outcome: Ongoing (interventions implemented as appropriate)    Goal: Discharge Needs Assessment  Outcome: Ongoing (interventions implemented as appropriate)    Goal: Interprofessional Rounds/Family Conf  Outcome: Ongoing (interventions implemented as appropriate)   09/19/19 1405   Interdisciplinary Rounds/Family Conf   Participants ;nursing;pharmacy;social work/services       Problem: Suicide Risk (Adult)  Goal: Strength-Based Wellness/Recovery  Outcome: Ongoing (interventions implemented as appropriate)   09/19/19 1405   Suicide Risk (Adult)   Strength-Based Wellness/Recovery making progress toward outcome     Goal: Physical Safety  Outcome: Ongoing (interventions implemented as appropriate)   09/20/19 1834   Suicide Risk (Adult)   Physical Safety making progress toward outcome       Problem: Fall Risk (Adult)  Goal: Absence of Fall  Outcome: Ongoing (interventions implemented as appropriate)   09/19/19 1405   Fall Risk (Adult)   Absence of Fall making progress toward outcome       Problem: Skin Injury Risk (Adult)  Goal: Skin Health and Integrity  Outcome: Ongoing (interventions implemented as appropriate)   09/19/19 1405   Skin Injury Risk (Adult)   Skin Health and Integrity making progress toward outcome

## 2019-09-20 NOTE — PROGRESS NOTES
Continued Stay Note  [facility] Vill[facility]ue[facility]     Patient Name: Valdemar Vo  MRN: [id number]  Today's Date: 9/20/2019    Admit Date: 9/19/2019    Discharge Plan     Row Name 09/20/19 3607       Plan    Plan  Patient to transfer to [facility] for INPT psych stabilization. See SW Notes.     Row Name 09/20/19 1427                         Anna Naegele RN CM   Phone [phone]  Fax   [phone]
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
## 2019-09-20 NOTE — PROGRESS NOTES
Continued Stay Note  ROMELIA Villanueva     Patient Name: Valdemar Vo  MRN: [id number]  Today's Date: 9/20/2019    Admit Date: 9/19/2019    Discharge Plan     Row Name 09/20/19 1049       Plan    Plan  DC Plan: [facility] - mobile assessment 9/20. Requested mobile /faxed documents @ 1048 to [facility].     Patient/Family in Agreement with Plan  yes    Plan Comments  SW called [facility] ([phone]) & requested mobile  to see pt. Faxed documents & mobile  should be sent out per [facility].     VICKIE Merino    Phone: [phone]  Cell: [phone]  Fax: [phone]  [email or web address].
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
## 2019-09-20 NOTE — PLAN OF CARE
Problem: Patient Care Overview  Goal: Plan of Care Review  Outcome: Ongoing (interventions implemented as appropriate)   09/20/19 0064   Coping/Psychosocial   Plan of Care Reviewed With patient   Plan of Care Review   Progress improving   OTHER   Outcome Summary Pt demonstrated significant improvement in mobility this session, progressing to gait training 400ft with no AD and ascended/descended 12 steps with single HR. Pt demonstrates independence with all mobility with no LOB/unsteadiness and has met all physical therapy goals. Aniticipate safe d/c home with fiance from a functional mobility perspective, however, per psych/social work, pt in need of IP rehabt stay at [facility] 2*/2 suicidal ideation. No further skilled physical therapy needs and pt will be d/c'd from PT services.
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
## 2019-09-20 NOTE — PAYOR COMM NOTE
"Observation admit   PA form and clinical attached.     -------------  Interqual criteria:  REVIEW DETAILS     Product: LOC:Acute Adult  Subset: General Medical      (Symptom or finding within 24h)         (Excludes PO medications unless noted)          [X] Select Day, One:              [X] Episode Day 1, One:                  [X] OBSERVATION, >= One:                      [X] General, >= One:                          [X] Danger to self or others, Both:                              [X] Condition, >= One:                                  [X] Suicidal or homicidal ideation                              [X] Intervention, >= One:                                  [X] Psychiatric crisis intervention or stabilization and observation every 15 min  ---------------  AUTHORIZATION PENDING:   PLEASE FAX OR CALL DETERMINATION TO CONTACT BELOW:     THANK YOU,  QUYNH Colby, RN  Utilization Review  [facility]  Phone: [phone]  Fax: [phone]        Valdemar Vo (49 y.o. Male)     Date of Birth Social Security Number Address Home Phone MRN    1970  [facility] [address]  [id number]    [facility] [facility] Status          None Single       Admission Date Admission Type Admitting Provider Attending Provider Department, Room/Bed    9/19/19 Emergency Anthony Nieto MD Farley, Timothy Michael, MD [facility] PROGRESS CARE, 2126/1    Discharge Date Discharge Disposition Discharge Destination                       Attending Provider:  Anthony Nieto MD    Allergies:  Codeine    Isolation:  None   Infection:  None   Code Status:  CPR    Ht:  165.1 cm (65\")   Wt:  100 kg (220 lb 14.4 oz)    Admission Cmt:  None   Principal Problem:  None                Active Insurance as of 9/19/2019     Primary Coverage     Payor Plan Insurance Group Employer/Plan Group    [employer] [place] [employer] [place]      Payor Plan Address Payor Plan Phone Number Payor Plan Fax " Number Effective Dates    [address] [address] [phone]  6/21/2019 - None Entered    [facility] [id number]       Subscriber Name Subscriber Birth Date Member ID       ZIYAD AARON 1970 [id number]                 Emergency Contacts      (Rel.) Home Phone Work Phone Mobile Phone    ZAFAR VARGAS (Significant Other) -- -- [phone]               History & Physical      Wu Escobedo MD at 09/19/19 0512          [facility] HOSPITALIST     Bert Donis DO    CHIEF COMPLAINT:     Chief Complaint   Patient presents with   • Suicidal       HISTORY OF PRESENT ILLNESS:    HPI  Patient is a very poor historian and he does not give me much information.  He told me he would like to go home.  I reviewed the chart.    Patient has been to the ER several times with complaint of seizures and also today he called the paramedics as he said that he was having a seizure and when paramedics arrived he was told to stop having seizures and he stopped.  Patient is an alcoholic and he is alcohol level is high.  Patient now has suicidal ideation and he has depression.  Patient also is on several medications which I am not sure he is taking it or not.    Overall I am not able to get info.     FH Negative    Social positive for Alcohol.     Past Medical History:   Diagnosis Date   • Alcohol abuse 7/29/2019   • Depression    • Head contusion 7/29/2019   • History of traumatic head injury 10/26/2018   • Hypertension    • Migraine    • Pneumonia    • Seizures (CMS/HCC)    • Suicidal ideation 8/7/2019     Past Surgical History:   Procedure Laterality Date   • HERNIA REPAIR       No family history on file.  Social History     Tobacco Use   • Smoking status: Never Smoker   • Smokeless tobacco: Never Used   Substance Use Topics   • Alcohol use: Yes     Alcohol/week: 3.6 oz     Types: 6 Cans of beer per week     Comment: on weekends   • Drug use: No       (Not in a hospital admission)  Allergies:  Codeine      There  "is no immunization history on file for this patient.        REVIEW OF SYSTEMS:     Review of Systems   All other systems reviewed and are negative.      Vital Signs  Temp:  [98.5 °F (36.9 °C)] 98.5 °F (36.9 °C)  Heart Rate:  [107-114] 107  Resp:  [14-15] 15  BP: (122-133)/(79-86) 122/79    Flowsheet Rows      First Filed Value   Admission Height  165.1 cm (65\") Documented at 09/19/2019 0348   Admission Weight  100 kg (220 lb 7.4 oz) Documented at 09/19/2019 0348           Physical Exam:    Physical Exam   Constitutional: He is oriented to person, place, and time. He appears well-developed and well-nourished. No distress.   HENT:   Head: Normocephalic and atraumatic.   Right Ear: External ear normal.   Left Ear: External ear normal.   Nose: Nose normal.   Mouth/Throat: Oropharynx is clear and moist. No oropharyngeal exudate.   Eyes: Conjunctivae and EOM are normal. Pupils are equal, round, and reactive to light. Right eye exhibits no discharge. Left eye exhibits no discharge. No scleral icterus.   Neck: Normal range of motion. No JVD present. No tracheal deviation present. No thyromegaly present.   Cardiovascular: Normal rate, regular rhythm, normal heart sounds and intact distal pulses. Exam reveals no gallop and no friction rub.   No murmur heard.  Pulmonary/Chest: Effort normal and breath sounds normal. No stridor. No respiratory distress. He has no wheezes. He has no rales. He exhibits no tenderness.   Abdominal: Soft. Bowel sounds are normal. He exhibits no distension and no mass. There is no tenderness. There is no rebound and no guarding. No hernia.   Musculoskeletal: Normal range of motion. He exhibits no edema, tenderness or deformity.   Lymphadenopathy:     He has no cervical adenopathy.   Neurological: He is alert and oriented to person, place, and time. No cranial nerve deficit or sensory deficit. He exhibits normal muscle tone. Coordination normal.   Skin: Skin is warm and dry. No rash noted. He is not " diaphoretic. No erythema.   Psychiatric: He has a normal mood and affect. His behavior is normal.   Nursing note and vitals reviewed.      Emotional Behavior:   Blunted affect   Debilities:  Age appropriate      Results Review:    I reviewed the patient's new clinical results.  Lab Results (most recent)     Procedure Component Value Units Date/Time    Urinalysis With Microscopic If Indicated (No Culture) - Urine, Clean Catch [[id number]]  (Abnormal) Collected:  09/19/19 0455    Specimen:  Urine, Clean Catch Updated:  09/19/19 0506     Color, UA Yellow     Appearance, UA Clear     pH, UA 5.5     Specific Gravity, UA 1.013     Glucose, UA Negative     Ketones, UA Trace     Bilirubin, UA Negative     Blood, UA Small (1+)     Protein, UA Negative     Leuk Esterase, UA Negative     Nitrite, UA Negative     Urobilinogen, UA 0.2 E.U./dL    Urinalysis, Microscopic Only - Urine, Clean Catch [[id number]] Collected:  09/19/19 0455    Specimen:  Urine, Clean Catch Updated:  09/19/19 0506     RBC, UA None Seen /HPF      WBC, UA None Seen /HPF      Bacteria, UA None Seen /HPF      Squamous Epithelial Cells, UA None Seen /HPF      Hyaline Casts, UA None Seen /LPF      Methodology Automated Microscopy    Urine Drug Screen - Urine, Clean Catch [[id number]] Collected:  09/19/19 0455    Specimen:  Urine, Clean Catch Updated:  09/19/19 0458    Comprehensive Metabolic Panel [[id number]]  (Abnormal) Collected:  09/19/19 0406    Specimen:  Blood Updated:  09/19/19 0434     Glucose 96 mg/dL      BUN 10 mg/dL      Creatinine 0.80 mg/dL      Sodium 123 mmol/L      Potassium 3.4 mmol/L      Chloride 88 mmol/L      CO2 21.0 mmol/L      Calcium 8.1 mg/dL      Total Protein 7.0 g/dL      Albumin 4.10 g/dL      ALT (SGPT) 17 U/L      AST (SGOT) 21 U/L      Alkaline Phosphatase 88 U/L      Total Bilirubin 0.4 mg/dL      eGFR Non African Amer 103 mL/min/1.73      Globulin 2.9 gm/dL      A/G Ratio 1.4 g/dL      BUN/Creatinine Ratio 12.5     Anion Gap  17.4 mmol/L     Magnesium [[id number]]  (Normal) Collected:  09/19/19 0406    Specimen:  Blood Updated:  09/19/19 0434     Magnesium 1.9 mg/dL     Ethanol [[id number]]  (Abnormal) Collected:  09/19/19 0406    Specimen:  Blood Updated:  09/19/19 0434     Ethanol % 0.154 %     Narrative:       Plasma Ethanol Clinical Symptoms:    ETOH (%)               Clinical Symptom  .01-.05              No apparent influence  .03-.12              Euphoria, Diminished judgment and attention   .09-.25              Impaired comprehension, Muscle incoordination  .18-.30              Confusion, Staggered gait, Slurred speech  .25-.40              Markedly decreased response to stimuli, unable to stand or                        walk, vomitting, sleep or stupor  .35-.50              Comatose, Anesthesia, Subnormal body temperature            Imaging Results (most recent)     None        reviewed    ECG/EMG Results (most recent)     None        reviewed              CT Facial Bones Without Contrast  Narrative: CT HEAD AND FACE WITHOUT CONTRAST     HISTORY: Seizure. Fall with swelling along the right side of the face.     TECHNIQUE: Axial CT scans of the head and face are provided and are  correlated with head CT scans 08/30/2019 and 06/22/2019. Multiplanar  reformatted images were produced.     Radiation dose reduction techniques were utilized, including automated  exposure control and exposure modulation based on body size.     FINDINGS HEAD CT: The ventricles are normal in caliber. The brain  parenchyma, extra-axial spaces, and the bones of the skull appear  normal. Orbital structures appear normal. Paranasal sinuses are clear.     FINDINGS FACE CT: There is screw and plate hardware along the left  infraorbital rim and along the anterior edge of the left zygomatic arch  and lateral orbital rim. There are old healed fractures. Subcutaneous  edema is observed mildly superficial and caudal to the right zygomatic  arch. There is no acute  facial fracture. Mandible is intact. Mucosal  thickening is observed in the left maxillary sinus and there is a large  lytic defect and the remaining left maxillary molar which contains some  radiopaque dental debris but there is a large central lucent defect and  that tooth appears split. The lucent defect extends through the tooth  into the maxillary sinus cavity. There is loss of bone around the apex  of that tooth. The visualized upper cervical spine appears normal except  for degenerative change between the odontoid and the C1 ring. The  visualized airway is patent.     Impression: Soft tissue contusion along the right side of the face. No  acute posttraumatic deformity is identified in the face. There is dental  hardware in the left maxillary molar with a large dental caries and  periapical lucency around that tooth. That tooth appears split.     There is no intracranial abnormality.     I called the findings to Aga Altamirano in the emergency department at 3:22  PM.     This report was finalized on 9/7/2019 3:53 PM by Dr. Willie Lynch M.D.     CT Head Without Contrast  Narrative: CT HEAD AND FACE WITHOUT CONTRAST     HISTORY: Seizure. Fall with swelling along the right side of the face.     TECHNIQUE: Axial CT scans of the head and face are provided and are  correlated with head CT scans 08/30/2019 and 06/22/2019. Multiplanar  reformatted images were produced.     Radiation dose reduction techniques were utilized, including automated  exposure control and exposure modulation based on body size.     FINDINGS HEAD CT: The ventricles are normal in caliber. The brain  parenchyma, extra-axial spaces, and the bones of the skull appear  normal. Orbital structures appear normal. Paranasal sinuses are clear.     FINDINGS FACE CT: There is screw and plate hardware along the left  infraorbital rim and along the anterior edge of the left zygomatic arch  and lateral orbital rim. There are old healed fractures.  Subcutaneous  edema is observed mildly superficial and caudal to the right zygomatic  arch. There is no acute facial fracture. Mandible is intact. Mucosal  thickening is observed in the left maxillary sinus and there is a large  lytic defect and the remaining left maxillary molar which contains some  radiopaque dental debris but there is a large central lucent defect and  that tooth appears split. The lucent defect extends through the tooth  into the maxillary sinus cavity. There is loss of bone around the apex  of that tooth. The visualized upper cervical spine appears normal except  for degenerative change between the odontoid and the C1 ring. The  visualized airway is patent.     Impression: Soft tissue contusion along the right side of the face. No  acute posttraumatic deformity is identified in the face. There is dental  hardware in the left maxillary molar with a large dental caries and  periapical lucency around that tooth. That tooth appears split.     There is no intracranial abnormality.     I called the findings to Aga Altamirano in the emergency department at 3:22  PM.     This report was finalized on 9/7/2019 3:53 PM by Dr. Willie Lynch M.D.         Assessment/Plan       Suicidal ideation    Severe hyponatremia and patient reports seizure although we are not sure whether he was having seizure or not.  He has history of seizure disorder.  He drinks alcohol and most likely his hyponatremia is from alcoholism.  We will correct his sodium slowly and will also obtain nephrology for help with sodium correction.    Depression with suicidal ideation and patient will be seen by psych and he may need placement.    Alcoholism and patient will be on thiamine/folic acid as well as IV fluids.  We will continue his regular diet.  Patient does not seem to be in withdrawal but if needed patient may need to be started on some benzodiazepines including Librium.    Seizure disorder patient is on Keppra and also on Trileptal  and will continue those.    Depression patient is on Zoloft and will be continued.  Psych will be seeing the patient    Osteoarthritis patient is on meloxicam 7.5 g p.o. daily and patient is at risk for peptic ulcer disease so I will add Protonix to the patient drug regimen.    DVT prophylaxis Lovenox.    I discussed the patients findings and my recommendations with patient.     Wu Escobedo MD  09/19/19  5:12 AM              Electronically signed by Wu Escobedo MD at 09/19/19 0518          Emergency Department Notes      Willie Christensen MD at 09/19/19 1431          Subjective   Patient called EMS for seizure.  Per EMS, patient feigning seizure, told to stop, patient stopped.  Patient has been seen by me exhibiting similar behavior in the past.  Patient admits ETOH tonight and verbalizes SI but has no plan.  Patient denies any self harm.  On ROS, otherwise, patient c/o nontraumatic left lateral neck pain with movement, mild.            Review of Systems   Musculoskeletal: Positive for neck pain.   Neurological:        As per hpi   Psychiatric/Behavioral:        As per hpi   All other systems reviewed and are negative.      Past Medical History:   Diagnosis Date   • Alcohol abuse 7/29/2019   • Depression    • Head contusion 7/29/2019   • History of traumatic head injury 10/26/2018   • Hypertension    • Migraine    • Pneumonia    • Seizures (CMS/HCC)    • Suicidal ideation 8/7/2019       Allergies   Allergen Reactions   • Codeine Unknown (See Comments)     Pt is unsure       Past Surgical History:   Procedure Laterality Date   • HERNIA REPAIR         No family history on file.    Social History     Socioeconomic History   • Marital status: Single     Spouse name: Not on file   • Number of children: Not on file   • Years of education: Not on file   • Highest education level: Not on file   Tobacco Use   • Smoking status: Never Smoker   • Smokeless tobacco: Never Used   Substance and Sexual Activity   • Alcohol  use: Yes     Alcohol/week: 3.6 oz     Types: 6 Cans of beer per week     Comment: on weekends   • Drug use: No   • Sexual activity: Yes           Objective   Physical Exam   Constitutional: He is oriented to person, place, and time. He appears well-developed and well-nourished.   HENT:   Head: Normocephalic and atraumatic.   Mouth/Throat: Oropharynx is clear and moist.   Eyes: Conjunctivae and EOM are normal. Pupils are equal, round, and reactive to light.   Neck:   Neck nontender, mild pain left lateral with FROM   Cardiovascular: Normal rate, regular rhythm, normal heart sounds and intact distal pulses.   Pulmonary/Chest: Effort normal and breath sounds normal.   Abdominal: Soft. Bowel sounds are normal.   Musculoskeletal: Normal range of motion. He exhibits no edema or deformity.   Neurological: He is alert and oriented to person, place, and time. No cranial nerve deficit.   Motor and sensation intact   Skin: Skin is warm and dry. Capillary refill takes less than 2 seconds.   Psychiatric:   As per hpi       Procedures          ED Course                  MDM  Number of Diagnoses or Management Options  Alcohol abuse:   Hyponatremia:   Suicidal ideation:   Diagnosis management comments: Results for orders placed or performed during the hospital encounter of 09/19/19  -Comprehensive Metabolic Panel       Result                      Value             Ref Range           Glucose                     96                65 - 99 mg/dL       BUN                         10                8 - 20 mg/dL        Creatinine                  0.80              0.70 - 1.20 *       Sodium                      123 (L)           136 - 144 mm*       Potassium                   3.4 (L)           3.6 - 5.1 mm*       Chloride                    88 (L)            101 - 111 mm*       CO2                         21.0 (L)          22.0 - 32.0 *       Calcium                     8.1 (L)           8.9 - 10.3 m*       Total Protein                "7.0               6.1 - 7.9 g/*       Albumin                     4.10              3.50 - 4.80 *       ALT (SGPT)                  17                17 - 63 U/L         AST (SGOT)                  21                15 - 41 U/L         Alkaline Phosphatase        88                32 - 91 U/L         Total Bilirubin             0.4               0.3 - 1.2 mg*       eGFR Non  Amer       103               >60 mL/min/1*       Globulin                    2.9               2.5 - 3.8 gm*       A/G Ratio                   1.4               1.0 - 1.7 g/*       BUN/Creatinine Ratio        12.5              6.2 - 20.3          Anion Gap                   17.4 (H)          5.0 - 15.0 m*  -Magnesium       Result                      Value             Ref Range           Magnesium                   1.9               1.8 - 2.5 mg*  -Ethanol       Result                      Value             Ref Range           Ethanol %                   0.154 (H)         <=0.079 %        Will admit to correct sodium, place on hold given si, am consult to psychiatry       Amount and/or Complexity of Data Reviewed  Clinical lab tests: reviewed        Final diagnoses:   Suicidal ideation   Alcohol abuse   Hyponatremia              Willie Christensen MD  09/19/19 0446      Electronically signed by Willie Christensen MD at 09/19/19 0446       [facility] Medications (active)       Dose Frequency Start End    acetaminophen (TYLENOL) 160 MG/5ML solution 650 mg 650 mg Every 4 Hours PRN 9/19/2019     Sig - Route: Take 20.3 mL by mouth Every 4 (Four) Hours As Needed for Mild Pain . - Oral    Linked Group 1:  \"Or\" Linked Group Details        acetaminophen (TYLENOL) suppository 650 mg 650 mg Every 4 Hours PRN 9/19/2019     Sig - Route: Insert 1 suppository into the rectum Every 4 (Four) Hours As Needed for Mild Pain . - Rectal    Linked Group 1:  \"Or\" Linked Group Details        acetaminophen (TYLENOL) tablet 650 mg 650 mg Every 4 Hours PRN 9/19/2019     Sig " "- Route: Take 2 tablets by mouth Every 4 (Four) Hours As Needed for Mild Pain . - Oral    Linked Group 1:  \"Or\" Linked Group Details        docusate sodium (COLACE) capsule 100 mg 100 mg 2 Times Daily PRN 9/19/2019     Sig - Route: Take 1 capsule by mouth 2 (Two) Times a Day As Needed for Constipation. - Oral    enoxaparin (LOVENOX) syringe 40 mg 40 mg Every 24 Hours 9/19/2019     Sig - Route: Inject 0.4 mL under the skin into the appropriate area as directed Daily. - Subcutaneous    famotidine (PEPCID) tablet 40 mg 40 mg Daily 9/19/2019     Sig - Route: Take 2 tablets by mouth Daily. - Oral    folic acid (FOLVITE) tablet 1 mg 1 mg Daily 9/19/2019     Sig - Route: Take 1 tablet by mouth Daily. - Oral    levETIRAcetam (KEPPRA) tablet 500 mg 500 mg 2 Times Daily 9/19/2019     Sig - Route: Take 1 tablet by mouth 2 (Two) Times a Day. - Oral    melatonin tablet 5 mg 5 mg Nightly PRN 9/19/2019     Sig - Route: Take 1 tablet by mouth At Night As Needed for Sleep. - Oral    meloxicam (MOBIC) tablet 7.5 mg 7.5 mg Daily 9/20/2019     Sig - Route: Take 0.5 tablets by mouth Daily. - Oral    nitroglycerin (NITROSTAT) SL tablet 0.4 mg 0.4 mg Every 5 Minutes PRN 9/19/2019     Sig - Route: Place 1 tablet under the tongue Every 5 (Five) Minutes As Needed for Chest Pain (Only if SBP Greater Than 100). - Sublingual    ondansetron (ZOFRAN) injection 4 mg 4 mg Every 6 Hours PRN 9/19/2019     Sig - Route: Infuse 2 mL into a venous catheter Every 6 (Six) Hours As Needed for Nausea or Vomiting. - Intravenous    OXcarbazepine (TRILEPTAL) tablet 900 mg 900 mg 2 Times Daily 9/19/2019     Sig - Route: Take 900 mg by mouth 2 (Two) Times a Day. - Oral    pantoprazole (PROTONIX) EC tablet 40 mg 40 mg Every Early Morning 9/19/2019     Sig - Route: Take 1 tablet by mouth Every Morning. - Oral    sertraline (ZOLOFT) tablet 75 mg 75 mg Daily 9/20/2019     Sig - Route: Take 1.5 tablets by mouth Daily. - Oral    sodium chloride 0.9 % flush 10 mL 10 mL " Every 12 Hours Scheduled 9/19/2019     Sig - Route: Infuse 10 mL into a venous catheter Every 12 (Twelve) Hours. - Intravenous    sodium chloride 0.9 % flush 10 mL 10 mL As Needed 9/19/2019     Sig - Route: Infuse 10 mL into a venous catheter As Needed for Line Care. - Intravenous    Vitamin B1 tablet 100 mg 100 mg Daily 9/19/2019     Sig - Route: Take 1 tablet by mouth Daily. - Oral    nicotine (NICODERM CQ) 21 MG/24HR patch 1 patch (Discontinued) 1 patch Every 24 Hours 9/19/2019 9/19/2019    Sig - Route: Place 1 patch on the skin as directed by provider Daily. - Transdermal    sodium chloride 0.9 % infusion (Discontinued) 100 mL/hr Continuous 9/19/2019 9/19/2019    Sig - Route: Infuse 100 mL/hr into a venous catheter Continuous. - Intravenous          Lab Results (last 48 hours)     Procedure Component Value Units Date/Time    BNP [[id number]]  (Normal) Collected:  09/20/19 0409    Specimen:  Blood Updated:  09/20/19 0653     BNP 23.0 pg/mL      Comment: Results may be falsely decreased if patient taking Biotin.       Uric Acid [[id number]]  (Abnormal) Collected:  09/20/19 0409    Specimen:  Blood Updated:  09/20/19 0513     Uric Acid 3.2 mg/dL     Basic Metabolic Panel [[id number]]  (Abnormal) Collected:  09/20/19 0409    Specimen:  Blood Updated:  09/20/19 0502     Glucose 90 mg/dL      BUN 10 mg/dL      Creatinine 0.80 mg/dL      Sodium 130 mmol/L      Potassium 4.1 mmol/L      Chloride 99 mmol/L      CO2 24.0 mmol/L      Calcium 8.2 mg/dL      eGFR Non African Amer 103 mL/min/1.73      BUN/Creatinine Ratio 12.5     Anion Gap 11.1 mmol/L     CBC & Differential [[id number]] Collected:  09/20/19 0409    Specimen:  Blood Updated:  09/20/19 0426    Narrative:       The following orders were created for panel order CBC & Differential.  Procedure                               Abnormality         Status                     ---------                               -----------         ------                     CBC Auto  Differential[[id number]]        Normal              Final result                 Please view results for these tests on the individual orders.    CBC Auto Differential [[id number]]  (Normal) Collected:  09/20/19 0409    Specimen:  Blood Updated:  09/20/19 0426     WBC 5.50 10*3/mm3      RBC 4.60 10*6/mm3      Hemoglobin 14.1 g/dL      Hematocrit 40.6 %      MCV 88.2 fL      MCH 30.6 pg      MCHC 34.7 g/dL      RDW 13.2 %      RDW-SD 41.6 fl      MPV 6.8 fL      Platelets 225 10*3/mm3      Neutrophil % 63.0 %      Lymphocyte % 23.2 %      Monocyte % 9.6 %      Eosinophil % 3.4 %      Basophil % 0.8 %      Neutrophils, Absolute 3.50 10*3/mm3      Lymphocytes, Absolute 1.30 10*3/mm3      Monocytes, Absolute 0.50 10*3/mm3      Eosinophils, Absolute 0.20 10*3/mm3      Basophils, Absolute 0.00 10*3/mm3      nRBC 0.0 /100 WBC     Osmolality, Urine - Urine, Clean Catch [[id number]]  (Normal) Collected:  09/19/19 1422    Specimen:  Urine, Clean Catch Updated:  09/19/19 2034     Osmolality, Urine 378 mOsm/kg     Osmolality, Urine - Urine, Clean Catch [[id number]]  (Normal) Collected:  09/19/19 0455    Specimen:  Urine, Clean Catch Updated:  09/19/19 2034     Osmolality, Urine 482 mOsm/kg     Osmolality, Serum [[id number]]  (Abnormal) Collected:  09/19/19 1036    Specimen:  Blood Updated:  09/19/19 2033     Osmolality 261 mOsm/kg     C-reactive Protein [[id number]]  (Normal) Collected:  09/19/19 1037    Specimen:  Blood Updated:  09/19/19 1954     C-Reactive Protein 0.04 mg/dL     Uric Acid [[id number]]  (Abnormal) Collected:  09/19/19 1037    Specimen:  Blood Updated:  09/19/19 1954     Uric Acid 3.2 mg/dL     Sodium, Urine, Random - Urine, Clean Catch [[id number]] Collected:  09/19/19 1422    Specimen:  Urine, Clean Catch Updated:  09/19/19 1604     Sodium, Urine 90 mmol/L     Creatinine, Urine, Random - Urine, Clean Catch [[id number]] Collected:  09/19/19 1422    Specimen:  Urine, Clean Catch Updated:  09/19/19 1603     Creatinine,  Urine 49.9 mg/dL     Ferritin [[id number]]  (Normal) Collected:  09/19/19 0406    Specimen:  Blood Updated:  09/19/19 1518     Ferritin 96.00 ng/mL      Comment: Results may be falsely decreased if patient taking Biotin.       Urinalysis With Culture If Indicated - Urine, Clean Catch [[id number]]  (Normal) Collected:  09/19/19 1422    Specimen:  Urine, Clean Catch Updated:  09/19/19 1506     Color, UA Yellow     Appearance, UA Clear     pH, UA 7.0     Specific Gravity, UA 1.011     Glucose, UA Negative     Ketones, UA Negative     Bilirubin, UA Negative     Blood, UA Negative     Protein, UA Negative     Leuk Esterase, UA Negative     Nitrite, UA Negative     Urobilinogen, UA 0.2 E.U./dL    Narrative:       Urine microscopic not indicated.    Ferritin [[id number]]  (Normal) Collected:  09/19/19 1037    Specimen:  Blood Updated:  09/19/19 1437     Ferritin 87.00 ng/mL      Comment: Results may be falsely decreased if patient taking Biotin.       T4, Free [[id number]]  (Normal) Collected:  09/19/19 1037    Specimen:  Blood Updated:  09/19/19 1437     Free T4 0.61 ng/dL      Comment: Results may be falsely increased if patient taking Biotin.       Vitamin B12 [[id number]]  (Normal) Collected:  09/19/19 1036    Specimen:  Blood Updated:  09/19/19 1402     Vitamin B-12 272 pg/mL      Comment: Results may be falsely increased if patient taking Biotin.       Folate [[id number]]  (Abnormal) Collected:  09/19/19 1036    Specimen:  Blood Updated:  09/19/19 1402     Folate >24.80 ng/mL     Narrative:       Results may be falsely increased if patient taking Biotin.    Procalcitonin [[id number]]  (Normal) Collected:  09/19/19 1037    Specimen:  Blood Updated:  09/19/19 1353     Procalcitonin 0.06 ng/mL     Hemoglobin A1c [[id number]]  (Normal) Collected:  09/19/19 1038    Specimen:  Blood Updated:  09/19/19 1346     Hemoglobin A1C 5.1 %     Narrative:       Hemoglobin A1C Reference Range:    <5.7 %        Normal  5.7-6.4 %     Increased  risk for diabetes  > 6.4 %        Diabetes       These guidelines have been recommended by the American Diabetic Association for Hgb A1c.      The following 2010 guidelines have been recommended by the American Diabetes Association for Hemoglobin A1c.    HBA1c 5.7-6.4% Increased risk for future diabetes (pre-diabetes)  HBA1c     >6.4% Diabetes    Lactic Acid, Plasma [[id number]]  (Normal) Collected:  09/19/19 1240    Specimen:  Blood Updated:  09/19/19 1303     Lactate 0.8 mmol/L     Protime-INR [[id number]]  (Normal) Collected:  09/19/19 1240    Specimen:  Blood Updated:  09/19/19 1258     Protime 10.3 Seconds      INR 1.00    Sedimentation Rate [[id number]]  (Normal) Collected:  09/19/19 1038    Specimen:  Blood Updated:  09/19/19 1231     Sed Rate 3 mm/hr     Lipid Panel [[id number]] Collected:  09/19/19 1036    Specimen:  Blood Updated:  09/19/19 1230     Total Cholesterol 167 mg/dL      Triglycerides 82 mg/dL      HDL Cholesterol 62 mg/dL      LDL Cholesterol  96 mg/dL      VLDL Cholesterol 16.4 mg/dL      LDL/HDL Ratio 1.43     Chol/HDL Ratio 2.69    Narrative:       The following guidelines have been recommended by the NCEP for Total Cholesterol, Total Triglycerides, LDL Cholesterol, and HDL Cholesterols    Total Cholesterol  Desirable:        <200 mg/dL  Borderline High:  200-239 mg/dL  High:             > or = 240 mg/dL    Total Triglyceride  Normal:           <150 mg/dL  Borderline High:  150-199 mg/dL  High:             200-499 mg/dL  Very High:        > or = 500 mg/dL    HDL Cholesterol  Low HDL:          <40 mg/dL  Normal:           40-60 mg/dL  Desirable:        >60 mg/dL    LDL Cholesterol  Optimal:          <100 mg/dL  Low Risk:         100-129 mg/dL  Borderline High:  130-159 mg/dL  High:             160-189 mg/dL  Very High:        > or = 190 mg/dL    The following ratios of LDL to HDL and Total cholesterol to HDL are for information only:    LDL/HDL Ratio  Desirable:        <5  Optimal:          < or  = 3.5    Total Cholesterol/HDL Ratio  Low Risk:         3.3-4.4  Average Risk:     4.4-7.1  Medium Risk:      7.1-11  High Risk:        >11       TSH [[id number]]  (Normal) Collected:  09/19/19 1037    Specimen:  Blood Updated:  09/19/19 1132     TSH 1.440 uIU/mL      Comment: Results may be falsely decreased if patient taking Biotin.       Lipase [[id number]]  (Normal) Collected:  09/19/19 1037    Specimen:  Blood Updated:  09/19/19 1129     Lipase 29 U/L     Amylase [[id number]]  (Normal) Collected:  09/19/19 1037    Specimen:  Blood Updated:  09/19/19 1129     Amylase 50 U/L     CK [[id number]]  (Normal) Collected:  09/19/19 1037    Specimen:  Blood Updated:  09/19/19 1129     Creatine Kinase 115 U/L     Comprehensive Metabolic Panel [[id number]]  (Abnormal) Collected:  09/19/19 1037    Specimen:  Blood Updated:  09/19/19 1129     Glucose 103 mg/dL      BUN 7 mg/dL      Creatinine 0.80 mg/dL      Sodium 126 mmol/L      Potassium 4.0 mmol/L      Chloride 94 mmol/L      CO2 22.0 mmol/L      Calcium 8.0 mg/dL      Total Protein 6.8 g/dL      Albumin 3.90 g/dL      ALT (SGPT) 15 U/L      AST (SGOT) 22 U/L      Alkaline Phosphatase 84 U/L      Total Bilirubin 0.6 mg/dL      eGFR Non African Amer 103 mL/min/1.73      Globulin 2.9 gm/dL      A/G Ratio 1.3 g/dL      BUN/Creatinine Ratio 8.8     Anion Gap 14.0 mmol/L     Magnesium [[id number]]  (Normal) Collected:  09/19/19 1037    Specimen:  Blood Updated:  09/19/19 1129     Magnesium 2.0 mg/dL     Phosphorus [[id number]]  (Normal) Collected:  09/19/19 1037    Specimen:  Blood Updated:  09/19/19 1129     Phosphorus 3.4 mg/dL     BNP [[id number]]  (Normal) Collected:  09/19/19 1037    Specimen:  Blood Updated:  09/19/19 1123     BNP 17.0 pg/mL      Comment: Results may be falsely decreased if patient taking Biotin.       Troponin [[id number]]  (Normal) Collected:  09/19/19 1036    Specimen:  Blood Updated:  09/19/19 1119     Troponin I <0.030 ng/mL     Narrative:       Troponin I  Reference Range:    0.00-0.03  Negative.  Repeat testing in 4-6 hours if clinically indicated.    0.04-0.29  Suspicious for myocardial injury. Serial measurements and clinical  correlation may be necessary to confirm or exclude diagnosis of acute  coronary syndrome.  Repeat testing in 4-6 hours if indicated.     >0.29 Consistent with myocardial injury.  Recommend clinical and laboratory correlation.     Results my be falsely decreased if patient taking Biotin.     Sodium, Urine, Random - Urine, Clean Catch [[id number]] Collected:  09/19/19 0455    Specimen:  Urine, Clean Catch Updated:  09/19/19 1119     Sodium, Urine 69 mmol/L     Creatinine, Urine, Random - Urine, Clean Catch [[id number]] Collected:  09/19/19 0455    Specimen:  Urine, Clean Catch Updated:  09/19/19 1119     Creatinine, Urine 73.4 mg/dL     CBC Auto Differential [[id number]]  (Normal) Collected:  09/19/19 1038    Specimen:  Blood Updated:  09/19/19 1055     WBC 5.30 10*3/mm3      RBC 4.47 10*6/mm3      Hemoglobin 13.9 g/dL      Hematocrit 39.2 %      MCV 87.8 fL      MCH 31.2 pg      MCHC 35.6 g/dL      RDW 13.1 %      RDW-SD 40.7 fl      MPV 6.6 fL      Platelets 236 10*3/mm3      Neutrophil % 69.9 %      Lymphocyte % 20.0 %      Monocyte % 7.3 %      Eosinophil % 2.5 %      Basophil % 0.3 %      Neutrophils, Absolute 3.70 10*3/mm3      Lymphocytes, Absolute 1.10 10*3/mm3      Monocytes, Absolute 0.40 10*3/mm3      Eosinophils, Absolute 0.10 10*3/mm3      Basophils, Absolute 0.00 10*3/mm3      nRBC 0.0 /100 WBC     Blood Gas, Arterial [[id number]]  (Abnormal) Collected:  09/19/19 0949    Specimen:  Arterial Blood Updated:  09/19/19 0952     Site Right Radial     Robert's Test Positive     pH, Arterial 7.396 pH units      pCO2, Arterial 37.9 mm Hg      pO2, Arterial 86.5 mm Hg      HCO3, Arterial 23.3 mmol/L      Base Excess, Arterial -1.3 mmol/L      Comment: Serial Number: [id number]:  [id number]        O2 Saturation, Arterial 96.6 %      CO2 Content  24.4 mmol/L      Barometric Pressure for Blood Gas --     Comment: N/A        Modality Room Air     FIO2 21 %      Hemodilution No    Urine Drug Screen - Urine, Clean Catch [[id number]]  (Abnormal) Collected:  09/19/19 0455    Specimen:  Urine, Clean Catch Updated:  09/19/19 0535     Barbiturates Screen, Urine Negative     Benzodiazepine Screen, Urine Negative     Cocaine Screen, Urine Positive     Opiate Screen Negative     THC, Screen, Urine Negative     Methadone Screen, Urine Negative     Amphetamine Screen, Urine Negative     Creatinine, Urine 73.3 mg/dL      Phencyclidine (PCP), Urine Negative    Narrative:       All urine drugs of abuse requests without chain of custody are for medical screening purposes only.  False positives are possible.      Urinalysis With Microscopic If Indicated (No Culture) - Urine, Clean Catch [[id number]]  (Abnormal) Collected:  09/19/19 0455    Specimen:  Urine, Clean Catch Updated:  09/19/19 0506     Color, UA Yellow     Appearance, UA Clear     pH, UA 5.5     Specific Gravity, UA 1.013     Glucose, UA Negative     Ketones, UA Trace     Bilirubin, UA Negative     Blood, UA Small (1+)     Protein, UA Negative     Leuk Esterase, UA Negative     Nitrite, UA Negative     Urobilinogen, UA 0.2 E.U./dL    Urinalysis, Microscopic Only - Urine, Clean Catch [[id number]] Collected:  09/19/19 0455    Specimen:  Urine, Clean Catch Updated:  09/19/19 0506     RBC, UA None Seen /HPF      WBC, UA None Seen /HPF      Bacteria, UA None Seen /HPF      Squamous Epithelial Cells, UA None Seen /HPF      Hyaline Casts, UA None Seen /LPF      Methodology Automated Microscopy    Comprehensive Metabolic Panel [[id number]]  (Abnormal) Collected:  09/19/19 0406    Specimen:  Blood Updated:  09/19/19 0434     Glucose 96 mg/dL      BUN 10 mg/dL      Creatinine 0.80 mg/dL      Sodium 123 mmol/L      Potassium 3.4 mmol/L      Chloride 88 mmol/L      CO2 21.0 mmol/L      Calcium 8.1 mg/dL      Total Protein 7.0 g/dL       Albumin 4.10 g/dL      ALT (SGPT) 17 U/L      AST (SGOT) 21 U/L      Alkaline Phosphatase 88 U/L      Total Bilirubin 0.4 mg/dL      eGFR Non African Amer 103 mL/min/1.73      Globulin 2.9 gm/dL      A/G Ratio 1.4 g/dL      BUN/Creatinine Ratio 12.5     Anion Gap 17.4 mmol/L     Magnesium [[id number]]  (Normal) Collected:  09/19/19 0406    Specimen:  Blood Updated:  09/19/19 0434     Magnesium 1.9 mg/dL     Ethanol [[id number]]  (Abnormal) Collected:  09/19/19 0406    Specimen:  Blood Updated:  09/19/19 0434     Ethanol % 0.154 %     Narrative:       Plasma Ethanol Clinical Symptoms:    ETOH (%)               Clinical Symptom  .01-.05              No apparent influence  .03-.12              Euphoria, Diminished judgment and attention   .09-.25              Impaired comprehension, Muscle incoordination  .18-.30              Confusion, Staggered gait, Slurred speech  .25-.40              Markedly decreased response to stimuli, unable to stand or                        walk, vomitting, sleep or stupor  .35-.50              Comatose, Anesthesia, Subnormal body temperature

## 2019-09-20 NOTE — PLAN OF CARE
Problem: Patient Care Overview  Goal: Plan of Care Review  Outcome: Ongoing (interventions implemented as appropriate)   09/20/19 1591   Coping/Psychosocial   Plan of Care Reviewed With patient   Plan of Care Review   Progress improving   OTHER   Outcome Summary Patient doing well tonight with no complaints. Sitter and patient significant other at bedside. Plan for Sheryl to evaluate patient today.

## 2019-09-20 NOTE — THERAPY TREATMENT NOTE
Acute Care - Physical Therapy Treatment Note  [facility]     Patient Name: Valdemar Vo  : 1970  MRN: [id number]  Today's Date: 2019             Admit Date: 2019    Visit Dx:    ICD-10-CM ICD-9-CM   1. Suicidal ideation R45.851 V62.84   2. Alcohol abuse F10.10 305.00   3. Hyponatremia E87.1 276.1     Patient Active Problem List   Diagnosis   • Status epilepticus (CMS/HCC)   • Seizure (CMS/HCC)   • Alcohol abuse   • Head contusion   • Suicidal ideation   • History of traumatic head injury   • Seizure disorder (CMS/HCC)   • Major depressive disorder, recurrent episode, severe (CMS/HCC)       Therapy Treatment    Rehabilitation Treatment Summary     Row Name 19 1333             Treatment Time/Intention    Discipline  physical therapist  -AO      Document Type  therapy note (daily note)  -AO      Subjective Information  no complaints  -AO      Mode of Treatment  physical therapy  -AO      Therapy Frequency (PT Clinical Impression)  other (see comments) Pt ready for d/c from physical therapy  -AO      Patient Effort  excellent  -AO      Existing Precautions/Restrictions  seizures  -AO      Recorded by [AO] Josie Caraballo, PT 19 1352      Row Name 19 1333             Vital Signs    Recovery Time  Vitals WNL on RA  -AO      Recorded by [AO] Josie Caraballo, PT 19 1352      Row Name 19 1333             Cognitive Assessment/Intervention- PT/OT    Orientation Status (Cognition)  oriented x 4  -AO      Recorded by [AO] Josie Caraballo, PT 19 1352      Row Name 19 1333             Bed Mobility Assessment/Treatment    Bed Mobility Assessment/Treatment  bed mobility (all) activities  -AO      [place] Level (Bed Mobility)  independent  -AO      Recorded by [AO] Josie Caraballo, PT 19 1352      Row Name 19 1333             Transfer Assessment/Treatment    Transfer Assessment/Treatment  sit-stand transfer;stand-sit transfer  -AO      Recorded by [AO]  Josie Caraballo, PT 09/20/19 1352      Row Name 09/20/19 1333             Sit-Stand Transfer    Sit-Stand [place] (Transfers)  independent  -AO      Recorded by [AO] Josie Caraballo, PT 09/20/19 1352      Row Name 09/20/19 1333             Stand-Sit Transfer    Stand-Sit [place] (Transfers)  independent  -AO      Recorded by [AO] Josie Caraballo, PT 09/20/19 1352      Row Name 09/20/19 1333             Gait/Stairs Assessment/Training    Gait/Stairs Assessment/Training  distance ambulated  -AO      [place] Level (Gait)  independent  -AO      Distance in Feet (Gait)  400 ft  -AO      Pattern (Gait)  other (see comments) reciprocal  -AO      Negotiation (Stairs)  stairs independence  -AO      [place] Level (Stairs)  independent  -AO      Handrail Location (Stairs)  right side (ascending)  -AO      Number of Steps (Stairs)  12  -AO      Ascending Technique (Stairs)  step-over-step  -AO      Descending Technique (Stairs)  step-over-step  -AO      Comment (Gait/Stairs)  Normal gait mechanics with no deficits  -AO      Recorded by [AO] Josie Caraballo, PT 09/20/19 1352      Row Name 09/20/19 1333             Pain Assessment    Additional Documentation  Pain Scale: Numbers Pre/Post-Treatment (Group)  -AO      Recorded by [AO] Josie Caraballo, PT 09/20/19 1352      Row Name 09/20/19 1333             Pain Scale: Numbers Pre/Post-Treatment    Pain Scale: Numbers, Pretreatment  0/10 - no pain  -AO      Pain Scale: Numbers, Post-Treatment  0/10 - no pain  -AO      Recorded by [AO] Josie Caraballo, PT 09/20/19 1352      Row Name 09/20/19 1333             Coping    Observed Emotional State  accepting;calm;cooperative  -AO      Verbalized Emotional State  acceptance  -AO      Recorded by [AO] Josei Caraballo, PT 09/20/19 1352      Row Name 09/20/19 1333             Plan of Care Review    Plan of Care Reviewed With  patient  -AO      Recorded by [AO] Josie Caraballo, PT 09/20/19 1352      Row Name  09/20/19 1333             Outcome Summary/Treatment Plan (PT)    Daily Summary of Progress (PT)  progress toward functional goals is good;prepare for discharge  -AO      Plan for Continued Treatment (PT)  Pt is ready for d/c from physical therapy  -AO      Anticipated Discharge Disposition (PT)  home  -AO      Patient/Family Concerns, Anticipated Discharge Disposition (PT)  From a funcitonal mobility perspective, pt is ready for home with no skilled physical therapy needs, however, per pysch and social work, pt with needs for [facility] secondary to suicidal ideation  -AO      Recorded by [AO] Josie Caraballo, PT 09/20/19 1352        User Key  (r) = Recorded By, (t) = Taken By, (c) = Cosigned By    Initials Name Effective Dates Discipline    AO Josie Caraballo, PT 03/01/19 -  PT               Rehab Goal Summary     Row Name 09/20/19 1333             Physical Therapy Goals    Bed Mobility Goal Selection (PT)  bed mobility, PT goal 1  -AO      Transfer Goal Selection (PT)  transfer, PT goal 1  -AO      Gait Training Goal Selection (PT)  gait training, PT goal 1  -AO         Gait Training Goal 1 (PT)    Activity/Assistive Device (Gait Training Goal 1, PT)  gait (walking locomotion);improve balance and speed;increase endurance/gait distance;decrease fall risk  -AO      [place] Level (Gait Training Goal 1, PT)  independent  -AO      Distance (Gait Goal 1, PT)  300  -AO      Time Frame (Gait Training Goal 1, PT)  1 week  -AO      Progress/Outcome (Gait Training Goal 1, PT)  goal met  -AO         Stairs Goal 1 (PT)    Activity/Assistive Device (Stairs Goal 1, PT)  stairs, all skills  -AO      [place] Level/Cues Needed (Stairs Goal 1, PT)  conditional independence  -AO      Time Frame (Stairs Goal 1, PT)  1 week Goal met  -AO        User Key  (r) = Recorded By, (t) = Taken By, (c) = Cosigned By    Initials Name Provider Type Discipline    Josie Aguirre, PT Physical Therapist PT          Physical Therapy  Education     Title: PT OT SLP Therapies (Done)     Topic: Physical Therapy (Done)     Point: Mobility training (Done)     Learning Progress Summary           Patient Acceptance, E,TB, VU by AO at 9/20/2019  1:53 PM    Comment:  Educated pt on safety with gait training and stairs    Acceptance, E, VU by VESNA at 9/19/2019  2:51 PM                   Point: Body mechanics (Done)     Learning Progress Summary           Patient Acceptance, E, VU by VESNA at 9/19/2019  2:51 PM                               User Key     Initials Effective Dates Name Provider Type Discipline    SHANON 03/01/19 -  Josie Caraballo, PT Physical Therapist PT    VESNA 03/01/19 -  Reyes, Carmela, PT Physical Therapist PT                PT Recommendation and Plan  Anticipated Discharge Disposition (PT): home  Therapy Frequency (PT Clinical Impression): other (see comments)(Pt ready for d/c from physical therapy)  Outcome Summary/Treatment Plan (PT)  Daily Summary of Progress (PT): progress toward functional goals is good, prepare for discharge  Plan for Continued Treatment (PT): Pt is ready for d/c from physical therapy  Anticipated Discharge Disposition (PT): home  Patient/Family Concerns, Anticipated Discharge Disposition (PT): From a funcitonal mobility perspective, pt is ready for home with no skilled physical therapy needs, however, per pysch and social work, pt with needs for [facility] secondary to suicidal ideation  Plan of Care Reviewed With: patient  Progress: improving  Outcome Summary: Pt demonstrated significant improvement in mobility this session, progressing to gait training 400ft with no AD and ascended/descended 12 steps with single HR. Pt demonstrates independence with all mobility with no LOB/unsteadiness and has met all physical therapy goals. Aniticipate safe d/c home with fiance from a functional mobility perspective, however, per psych/social work, pt in need of IP rehabt stay at [facility] 2*/2 suicidal ideation. No further skilled  physical therapy needs and pt will be d/c'd from PT services.     Time Calculation:   PT Charges     Row Name 09/20/19 1356             Time Calculation    Start Time  1333  -AO      Stop Time  1341  -AO      Time Calculation (min)  8 min  -AO      PT Received On  09/20/19  -AO         Time Calculation- PT    Total Timed Code Minutes- PT  8 minute(s)  -AO        User Key  (r) = Recorded By, (t) = Taken By, (c) = Cosigned By    Initials Name Provider Type    AO Josie Caraballo, PT Physical Therapist        Therapy Charges for Today     Code Description Service Date Service Provider Modifiers Qty    [id number] HC GAIT TRAINING EA 15 MIN 9/20/2019 Josie Caraballo, PT GP 1               Josie Caraballo, PT  9/20/2019

## 2019-09-21 NOTE — PLAN OF CARE
Problem: Patient Care Overview  Goal: Plan of Care Review  Outcome: Outcome(s) achieved Date Met: 09/21/19 09/21/19 0002   Coping/Psychosocial   Plan of Care Reviewed With patient   Plan of Care Review   Progress improving   OTHER   Outcome Summary Pt being discharged to [facility].      Goal: Interprofessional Rounds/Family Conf  Outcome: Outcome(s) achieved Date Met: 09/21/19      Problem: Suicide Risk (Adult)  Goal: Strength-Based Wellness/Recovery  Outcome: Outcome(s) achieved Date Met: 09/21/19    Goal: Physical Safety  Outcome: Outcome(s) achieved Date Met: 09/21/19      Problem: Fall Risk (Adult)  Goal: Absence of Fall  Outcome: Outcome(s) achieved Date Met: 09/21/19      Problem: Skin Injury Risk (Adult)  Goal: Skin Health and Integrity  Outcome: Outcome(s) achieved Date Met: 09/21/19

## 2019-09-23 NOTE — PROGRESS NOTES
Case Management Discharge Note    Final Note: Sheryl      Final Discharge Disposition Code: 65 - psychiatric hospital or unit

## 2019-10-04 ENCOUNTER — HOSPITAL ENCOUNTER (EMERGENCY)
Facility: HOSPITAL | Age: 49
Discharge: HOME | End: 2019-10-04
Attending: EMERGENCY MEDICINE | Admitting: EMERGENCY MEDICINE

## 2019-10-04 ENCOUNTER — APPOINTMENT (OUTPATIENT)
Dept: CT IMAGING | Facility: HOSPITAL | Age: 49
End: 2019-10-04

## 2019-10-04 VITALS
TEMPERATURE: 99.7 F | SYSTOLIC BLOOD PRESSURE: 112 MMHG | OXYGEN SATURATION: 100 % | WEIGHT: 220 LBS | BODY MASS INDEX: 36.65 KG/M2 | RESPIRATION RATE: 18 BRPM | HEIGHT: 65 IN | HEART RATE: 106 BPM | DIASTOLIC BLOOD PRESSURE: 61 MMHG

## 2019-10-04 DIAGNOSIS — G40.909 RECURRENT SEIZURES (HCC): ICD-10-CM

## 2019-10-04 DIAGNOSIS — F10.929 ALCOHOLIC INTOXICATION WITH COMPLICATION (HCC): Primary | ICD-10-CM

## 2019-10-04 LAB
ALBUMIN SERPL-MCNC: 4.2 G/DL (ref 3.5–5.2)
ALBUMIN/GLOB SERPL: 1.7 G/DL
ALP SERPL-CCNC: 107 U/L (ref 39–117)
ALT SERPL W P-5'-P-CCNC: 11 U/L (ref 1–41)
AMPHET+METHAMPHET UR QL: NEGATIVE
ANION GAP SERPL CALCULATED.3IONS-SCNC: 13.9 MMOL/L (ref 5–15)
AST SERPL-CCNC: 14 U/L (ref 1–40)
BARBITURATES UR QL SCN: NEGATIVE
BASOPHILS # BLD AUTO: 0.05 10*3/MM3 (ref 0–0.2)
BASOPHILS NFR BLD AUTO: 0.7 % (ref 0–1.5)
BENZODIAZ UR QL SCN: NEGATIVE
BILIRUB SERPL-MCNC: 0.2 MG/DL (ref 0.2–1.2)
BUN BLD-MCNC: 8 MG/DL (ref 6–20)
BUN/CREAT SERPL: 10.3 (ref 7–25)
CALCIUM SPEC-SCNC: 8.1 MG/DL (ref 8.6–10.5)
CANNABINOIDS SERPL QL: NEGATIVE
CHLORIDE SERPL-SCNC: 92 MMOL/L (ref 98–107)
CO2 SERPL-SCNC: 24.1 MMOL/L (ref 22–29)
COCAINE UR QL: POSITIVE
CREAT BLD-MCNC: 0.78 MG/DL (ref 0.76–1.27)
DEPRECATED RDW RBC AUTO: 42.3 FL (ref 37–54)
EOSINOPHIL # BLD AUTO: 0.13 10*3/MM3 (ref 0–0.4)
EOSINOPHIL NFR BLD AUTO: 1.7 % (ref 0.3–6.2)
ERYTHROCYTE [DISTWIDTH] IN BLOOD BY AUTOMATED COUNT: 12.9 % (ref 12.3–15.4)
ETHANOL BLD-MCNC: 107 MG/DL (ref 0–10)
ETHANOL UR QL: 0.11 %
GFR SERPL CREATININE-BSD FRML MDRD: 106 ML/MIN/1.73
GLOBULIN UR ELPH-MCNC: 2.5 GM/DL
GLUCOSE BLD-MCNC: 87 MG/DL (ref 65–99)
HCT VFR BLD AUTO: 37.5 % (ref 37.5–51)
HGB BLD-MCNC: 12.6 G/DL (ref 13–17.7)
IMM GRANULOCYTES # BLD AUTO: 0.03 10*3/MM3 (ref 0–0.05)
IMM GRANULOCYTES NFR BLD AUTO: 0.4 % (ref 0–0.5)
LYMPHOCYTES # BLD AUTO: 1.29 10*3/MM3 (ref 0.7–3.1)
LYMPHOCYTES NFR BLD AUTO: 17.2 % (ref 19.6–45.3)
MCH RBC QN AUTO: 30.1 PG (ref 26.6–33)
MCHC RBC AUTO-ENTMCNC: 33.6 G/DL (ref 31.5–35.7)
MCV RBC AUTO: 89.7 FL (ref 79–97)
METHADONE UR QL SCN: NEGATIVE
MONOCYTES # BLD AUTO: 0.73 10*3/MM3 (ref 0.1–0.9)
MONOCYTES NFR BLD AUTO: 9.7 % (ref 5–12)
NEUTROPHILS # BLD AUTO: 5.26 10*3/MM3 (ref 1.7–7)
NEUTROPHILS NFR BLD AUTO: 70.3 % (ref 42.7–76)
NRBC BLD AUTO-RTO: 0 /100 WBC (ref 0–0.2)
OPIATES UR QL: NEGATIVE
OXYCODONE UR QL SCN: NEGATIVE
PLATELET # BLD AUTO: 266 10*3/MM3 (ref 140–450)
PMV BLD AUTO: 8.5 FL (ref 6–12)
POTASSIUM BLD-SCNC: 3.5 MMOL/L (ref 3.5–5.2)
PROT SERPL-MCNC: 6.7 G/DL (ref 6–8.5)
RBC # BLD AUTO: 4.18 10*6/MM3 (ref 4.14–5.8)
SODIUM BLD-SCNC: 130 MMOL/L (ref 136–145)
WBC NRBC COR # BLD: 7.49 10*3/MM3 (ref 3.4–10.8)

## 2019-10-04 PROCEDURE — 80307 DRUG TEST PRSMV CHEM ANLYZR: CPT | Performed by: EMERGENCY MEDICINE

## 2019-10-04 PROCEDURE — 80183 DRUG SCRN QUANT OXCARBAZEPIN: CPT | Performed by: EMERGENCY MEDICINE

## 2019-10-04 PROCEDURE — 25010000002 LEVETIRACETAM IN NACL 0.82% 500 MG/100ML SOLUTION: Performed by: EMERGENCY MEDICINE

## 2019-10-04 PROCEDURE — 36415 COLL VENOUS BLD VENIPUNCTURE: CPT

## 2019-10-04 PROCEDURE — 85025 COMPLETE CBC W/AUTO DIFF WBC: CPT | Performed by: EMERGENCY MEDICINE

## 2019-10-04 PROCEDURE — 96365 THER/PROPH/DIAG IV INF INIT: CPT

## 2019-10-04 PROCEDURE — 70450 CT HEAD/BRAIN W/O DYE: CPT

## 2019-10-04 PROCEDURE — 80053 COMPREHEN METABOLIC PANEL: CPT | Performed by: EMERGENCY MEDICINE

## 2019-10-04 PROCEDURE — 99285 EMERGENCY DEPT VISIT HI MDM: CPT

## 2019-10-04 RX ORDER — LEVETIRACETAM 5 MG/ML
500 INJECTION INTRAVASCULAR ONCE
Status: COMPLETED | OUTPATIENT
Start: 2019-10-04 | End: 2019-10-04

## 2019-10-04 RX ORDER — SODIUM CHLORIDE 0.9 % (FLUSH) 0.9 %
10 SYRINGE (ML) INJECTION AS NEEDED
Status: DISCONTINUED | OUTPATIENT
Start: 2019-10-04 | End: 2019-10-04 | Stop reason: HOSPADM

## 2019-10-04 RX ADMIN — LEVETIRACETAM 500 MG: 5 INJECTION INTRAVENOUS at 16:26

## 2019-10-04 RX ADMIN — SODIUM CHLORIDE 1000 ML: 9 INJECTION, SOLUTION INTRAVENOUS at 16:24

## 2019-10-04 NOTE — ED NOTES
Pt wife to ED nurses station inquiring about wait for CT. Pt wife informed this RN will call CT to obtain ETA. Spoke with CT tech, was informed there are two pt's in front of this pt to be scanned and they will scan pt as soon as possible. Pt wife verbalized understanding.      Darleen Rao, RN  10/04/19 4094

## 2019-10-04 NOTE — ED PROVIDER NOTES
EMERGENCY DEPARTMENT ENCOUNTER    CHIEF COMPLAINT  Chief Complaint: seizure  History given by: pt  History limited by: nothing  Room Number: 11/11  PMD: Bert Donis,       HPI:  Pt is a 49 y.o. male w/ hx of seizures who presents to the ED via EMS for evaluation, after pt was driving today, and pulled over after feeling seizure coming on. Pt reports having seizure and called EMS, who witnessed second tonic-clonic seizure. Admits to urinary incontinence, but denies fecal incontinence or biting tongue. Also c/o posterior, mild HA and back pain s/p seizure. States having 1.5 beers today, but normally drinks a few beers a day. Reports missing a dose of Keppra last week. Denies illicit drug use recently (admits to smoking marijuana in the past) or smoking cigarettes.    PAST MEDICAL HISTORY  Active Ambulatory Problems     Diagnosis Date Noted   • Status epilepticus (CMS/HCC) 06/22/2019   • Seizure (CMS/[facility]) 07/29/2019   • Alcohol abuse 07/29/2019   • Head contusion 07/29/2019   • Suicidal ideation 08/07/2019   • History of traumatic head injury 10/26/2018   • Seizure disorder (CMS/HCC) 06/25/2019   • Major depressive disorder, recurrent episode, severe (CMS/[facility]) 08/07/2019     Resolved Ambulatory Problems     Diagnosis Date Noted   • No Resolved Ambulatory Problems     Past Medical History:   Diagnosis Date   • Alcohol abuse 7/29/2019   • Depression    • Head contusion 7/29/2019   • History of traumatic head injury 10/26/2018   • Hypertension    • Migraine    • Pneumonia    • Seizures (CMS/[facility])    • Suicidal ideation 8/7/2019       PAST SURGICAL HISTORY  Past Surgical History:   Procedure Laterality Date   • HERNIA REPAIR         FAMILY HISTORY  No family history on file.    SOCIAL HISTORY  Social History     Socioeconomic History   • Marital status: Single     Spouse name: Not on file   • Number of children: Not on file   • Years of education: Not on file   • Highest education level: Not on file   Tobacco Use    • Smoking status: Never Smoker   • Smokeless tobacco: Never Used   Substance and Sexual Activity   • Alcohol use: Yes     Alcohol/week: 3.6 oz     Types: 6 Cans of beer per week     Comment: on weekends   • Drug use: No   • Sexual activity: Yes       ALLERGIES  Codeine    REVIEW OF SYSTEMS  Review of Systems   Constitutional: Negative for activity change, appetite change and fever.   HENT: Negative for congestion, sore throat and trouble swallowing.    Eyes: Negative.    Respiratory: Negative for cough and shortness of breath.    Cardiovascular: Negative for chest pain and leg swelling.   Gastrointestinal: Negative for abdominal pain, diarrhea and vomiting.   Endocrine: Negative.    Genitourinary: Negative for decreased urine volume, dysuria and flank pain.   Musculoskeletal: Positive for back pain. Negative for neck pain.   Skin: Negative for rash and wound.   Allergic/Immunologic: Negative.    Neurological: Positive for seizures and headaches. Negative for weakness and numbness.   Hematological: Negative.    Psychiatric/Behavioral: Negative.  Negative for confusion and sleep disturbance.   All other systems reviewed and are negative.    All systems reviewed and negative except for those discussed in HPI.    PHYSICAL EXAM  ED Triage Vitals   Temp Heart Rate Resp BP SpO2   10/04/19 1537 10/04/19 1537 10/04/19 1537 10/04/19 1537 10/04/19 1537   99.7 °F (37.6 °C) 83 18 144/82 98 %      Temp src Heart Rate Source Patient Position BP Location FiO2 (%)   -- 10/04/19 1548 10/04/19 1548 -- --    Monitor Lying         Physical Exam   Constitutional: He is oriented to person, place, and time. No distress.   HENT:   Head: Normocephalic and atraumatic.   Mouth/Throat: Oropharynx is clear and moist.   No tongue abrasions   Eyes: EOM are normal. Pupils are equal, round, and reactive to light.   Neck: Normal range of motion. Neck supple.   Cardiovascular: Normal rate, regular rhythm and normal heart sounds.   Pulmonary/Chest:  Effort normal and breath sounds normal. No respiratory distress.   Abdominal: Soft. Bowel sounds are normal. He exhibits no distension. There is no tenderness.   Musculoskeletal: Normal range of motion. He exhibits no edema.   Neurological: He is alert and oriented to person, place, and time. He has normal sensation and normal strength. He displays no weakness (focal) and facial symmetry.   Skin: Skin is warm and dry.   Abrasion to posterior scalp   Psychiatric: Mood and affect normal.   Nursing note and vitals reviewed.      LAB RESULTS  Lab Results (last 24 hours)     Procedure Component Value Units Date/Time    Oxcarbazepine Level [[id number]] Collected:  10/04/19 1618    Specimen:  Blood Updated:  10/04/19 1632    CBC & Differential [[id number]] Collected:  10/04/19 1618    Specimen:  Blood Updated:  10/04/19 1637    Narrative:       The following orders were created for panel order CBC & Differential.  Procedure                               Abnormality         Status                     ---------                               -----------         ------                     CBC Auto Differential[[id number]]        Abnormal            Final result                 Please view results for these tests on the individual orders.    Comprehensive Metabolic Panel [[id number]]  (Abnormal) Collected:  10/04/19 1618    Specimen:  Blood Updated:  10/04/19 1658     Glucose 87 mg/dL      BUN 8 mg/dL      Creatinine 0.78 mg/dL      Sodium 130 mmol/L      Potassium 3.5 mmol/L      Chloride 92 mmol/L      CO2 24.1 mmol/L      Calcium 8.1 mg/dL      Total Protein 6.7 g/dL      Albumin 4.20 g/dL      ALT (SGPT) 11 U/L      AST (SGOT) 14 U/L      Alkaline Phosphatase 107 U/L      Total Bilirubin 0.2 mg/dL      eGFR Non African Amer 106 mL/min/1.73      Globulin 2.5 gm/dL      A/G Ratio 1.7 g/dL      BUN/Creatinine Ratio 10.3     Anion Gap 13.9 mmol/L     Narrative:       GFR Normal >60  Chronic Kidney Disease <60  Kidney Failure <15     Ethanol [[id number]]  (Abnormal) Collected:  10/04/19 1618    Specimen:  Blood Updated:  10/04/19 1658     Ethanol 107 mg/dL      Ethanol % 0.107 %     CBC Auto Differential [[id number]]  (Abnormal) Collected:  10/04/19 1618    Specimen:  Blood Updated:  10/04/19 1637     WBC 7.49 10*3/mm3      RBC 4.18 10*6/mm3      Hemoglobin 12.6 g/dL      Hematocrit 37.5 %      MCV 89.7 fL      MCH 30.1 pg      MCHC 33.6 g/dL      RDW 12.9 %      RDW-SD 42.3 fl      MPV 8.5 fL      Platelets 266 10*3/mm3      Neutrophil % 70.3 %      Lymphocyte % 17.2 %      Monocyte % 9.7 %      Eosinophil % 1.7 %      Basophil % 0.7 %      Immature Grans % 0.4 %      Neutrophils, Absolute 5.26 10*3/mm3      Lymphocytes, Absolute 1.29 10*3/mm3      Monocytes, Absolute 0.73 10*3/mm3      Eosinophils, Absolute 0.13 10*3/mm3      Basophils, Absolute 0.05 10*3/mm3      Immature Grans, Absolute 0.03 10*3/mm3      nRBC 0.0 /100 WBC     Urine Drug Screen - Urine, Clean Catch [[id number]]  (Abnormal) Collected:  10/04/19 1620    Specimen:  Urine, Clean Catch Updated:  10/04/19 1659     Amphet/Methamphet, Screen Negative     Barbiturates Screen, Urine Negative     Benzodiazepine Screen, Urine Negative     Cocaine Screen, Urine Positive     Opiate Screen Negative     THC, Screen, Urine Negative     Methadone Screen, Urine Negative     Oxycodone Screen, Urine Negative    Narrative:       Negative Thresholds For Drugs Screened:     Amphetamines               500 ng/ml   Barbiturates               200 ng/ml   Benzodiazepines            100 ng/ml   Cocaine                    300 ng/ml   Methadone                  300 ng/ml   Opiates                    300 ng/ml   Oxycodone                  100 ng/ml   THC                        50 ng/ml    The Normal Value for all drugs tested is negative. This report includes final unconfirmed screening results to be used for medical treatment purposes only. Unconfirmed results must not be used for non-medical purposes such as  employment or legal testing. Clinical consideration should be applied to any drug of abuse test, particulary when unconfirmed results are used.          I ordered the above labs and reviewed the results.    PROGRESS AND CONSULTS     1617- HR 86. O2 sat 94%. /82. Discussed w/ pt plan to check labs, as well as treat pt w/ Keppra. Declines Tylenol/Motrin for HA/back pain management. Pt understands and agrees with the plan, all questions answered. Ordered labs for further evaluation. Also ordered IV Keppra, IVF, and IV Bolus for sx management.    1713- HR 79. 100% O2 sats on RA. /76. Rechecked pt. Pt is resting comfortably, and reports wife is on the way. Notified pt of ETOH of 107. Discussed the plan to feed pt, and discharge once wife arrives. Pt understands and agrees with the plan, all questions answered.    1724- Ordered CT Head for further evaluation.    1909- Rechecked pt. Pt is resting comfortably, w/ wife at bedside. Notified pt of negative CT Head. Discussed the plan to discharge the pt home. I instructed the pt to f/u w/ PCP for further evaluation. Pt understands and agrees with the plan, all questions answered.       MEDICAL DECISION MAKING  Results were reviewed/discussed with the patient and they were also made aware of online access. Pt also made aware that some labs, such as cultures, will not be resulted during ER visit and follow up with PMD is necessary.          DIAGNOSIS  Final diagnoses:   Alcoholic intoxication with complication (CMS/HCC)   Recurrent seizures (CMS/HCC)       DISPOSITION  DISCHARGE    Patient discharged in stable condition.    Reviewed implications of results, diagnosis, meds, responsibility to follow up, warning signs and symptoms of possible worsening, potential complications and reasons to return to ER, including worsening sx.    Patient/Family voiced understanding of above instructions.    Discussed plan for discharge, as there is no emergent indication for  admission. Patient referred to primary care provider for BP management due to today's BP. Pt/family is agreeable and understands need for follow up and repeat testing.  Pt is aware that discharge does not mean that nothing is wrong but it indicates no emergency is present that requires admission and they must continue care with follow-up as given below or physician of their choice.     FOLLOW-UP  Bert Donis, DO  [address] [id number] [phone]    Schedule an appointment as soon as possible for a visit            Medication List      No changes were made to your prescriptions during this visit.           Latest Documented Vital Signs:  As of 5:07 PM  BP- 124/82 HR- 86 Temp- 99.7 °F (37.6 °C) O2 sat- 94%    --  Documentation assistance provided by tejinder Ahn for Dr. Carr.  Information recorded by the scribe was done at my direction and has been verified and validated by me.     Yamilet Ahn  10/04/19 1916       German Carr MD  10/04/19 9604

## 2019-10-04 NOTE — DISCHARGE INSTRUCTIONS
Do not drive today nor drink any more alcohol.  You should never drink and drive.  Continue taking your Keppra and Trileptal.  These return to the emergency department if symptoms worsen.

## 2019-10-04 NOTE — ED NOTES
Per EMS, pt called 9-1-1 after pulling over to the side of the road stating he had a seizure. Upon EMS arrival, states there were multiple open containers of beer present, called PD. While awaiting arrival from PD, pt started seizing again, fell to ground hitting head on concrete. Pt c/o posterior headache. Pt reports he has hx of seizures and takes medications as directed.      Darleen Rao, RN  10/04/19 6669

## 2019-10-04 NOTE — ED NOTES
Pt wife at bedside, updated on plan of care. Pt and pt wife verbalized understanding.      Darleen Rao, RN  10/04/19 4975

## 2019-10-07 LAB — OXCARBAZEPINE: 22 UG/ML (ref 10–35)

## 2019-10-13 ENCOUNTER — APPOINTMENT (OUTPATIENT)
Dept: GENERAL RADIOLOGY | Facility: HOSPITAL | Age: 49
End: 2019-10-13

## 2019-10-13 ENCOUNTER — HOSPITAL ENCOUNTER (EMERGENCY)
Facility: HOSPITAL | Age: 49
Discharge: HOME | End: 2019-10-13
Admitting: EMERGENCY MEDICINE

## 2019-10-13 VITALS
TEMPERATURE: 98.5 F | DIASTOLIC BLOOD PRESSURE: 99 MMHG | WEIGHT: 220.46 LBS | BODY MASS INDEX: 36.73 KG/M2 | RESPIRATION RATE: 16 BRPM | HEART RATE: 103 BPM | HEIGHT: 65 IN | SYSTOLIC BLOOD PRESSURE: 132 MMHG | OXYGEN SATURATION: 98 %
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
## 2019-10-13 DIAGNOSIS — S40.022A CONTUSION OF LEFT UPPER EXTREMITY, INITIAL ENCOUNTER: ICD-10-CM

## 2019-10-13 DIAGNOSIS — E87.1 HYPONATREMIA: ICD-10-CM

## 2019-10-13 DIAGNOSIS — R56.9 SEIZURE (HCC): Primary | ICD-10-CM

## 2019-10-13 DIAGNOSIS — E87.8 HYPOCHLOREMIA: ICD-10-CM

## 2019-10-13 LAB
ALBUMIN SERPL-MCNC: 4.2 G/DL (ref 3.5–4.8)
ALBUMIN/GLOB SERPL: 1.4 G/DL (ref 1–1.7)
ALP SERPL-CCNC: 114 U/L (ref 32–91)
ALT SERPL W P-5'-P-CCNC: 15 U/L (ref 17–63)
ANION GAP SERPL CALCULATED.3IONS-SCNC: 15.2 MMOL/L (ref 5–15)
AST SERPL-CCNC: 24 U/L (ref 15–41)
BASOPHILS # BLD AUTO: 0.1 10*3/MM3 (ref 0–0.2)
BASOPHILS NFR BLD AUTO: 1 % (ref 0–1.5)
BILIRUB SERPL-MCNC: 0.7 MG/DL (ref 0.3–1.2)
BUN BLD-MCNC: 5 MG/DL (ref 8–20)
BUN/CREAT SERPL: 5.6 (ref 6.2–20.3)
CALCIUM SPEC-SCNC: 8.7 MG/DL (ref 8.9–10.3)
CHLORIDE SERPL-SCNC: 94 MMOL/L (ref 101–111)
CO2 SERPL-SCNC: 24 MMOL/L (ref 22–32)
CREAT BLD-MCNC: 0.9 MG/DL (ref 0.7–1.2)
DEPRECATED RDW RBC AUTO: 42 FL (ref 37–54)
EOSINOPHIL # BLD AUTO: 0.1 10*3/MM3 (ref 0–0.4)
EOSINOPHIL NFR BLD AUTO: 1.3 % (ref 0.3–6.2)
ERYTHROCYTE [DISTWIDTH] IN BLOOD BY AUTOMATED COUNT: 13.5 % (ref 12.3–15.4)
GFR SERPL CREATININE-BSD FRML MDRD: 90 ML/MIN/1.73
GLOBULIN UR ELPH-MCNC: 2.9 GM/DL (ref 2.5–3.8)
GLUCOSE BLD-MCNC: 109 MG/DL (ref 65–99)
HCT VFR BLD AUTO: 40.8 % (ref 37.5–51)
HGB BLD-MCNC: 14.6 G/DL (ref 13–17.7)
HOLD SPECIMEN: NORMAL
HOLD SPECIMEN: NORMAL
LYMPHOCYTES # BLD AUTO: 0.7 10*3/MM3 (ref 0.7–3.1)
LYMPHOCYTES NFR BLD AUTO: 8.4 % (ref 19.6–45.3)
MCH RBC QN AUTO: 31.8 PG (ref 26.6–33)
MCHC RBC AUTO-ENTMCNC: 35.8 G/DL (ref 31.5–35.7)
MCV RBC AUTO: 89 FL (ref 79–97)
MONOCYTES # BLD AUTO: 0.5 10*3/MM3 (ref 0.1–0.9)
MONOCYTES NFR BLD AUTO: 6.7 % (ref 5–12)
NEUTROPHILS # BLD AUTO: 6.7 10*3/MM3 (ref 1.7–7)
NEUTROPHILS NFR BLD AUTO: 82.6 % (ref 42.7–76)
NRBC BLD AUTO-RTO: 0 /100 WBC (ref 0–0.2)
PLATELET # BLD AUTO: 268 10*3/MM3 (ref 140–450)
PMV BLD AUTO: 6.9 FL (ref 6–12)
POTASSIUM BLD-SCNC: 4.2 MMOL/L (ref 3.6–5.1)
PROT SERPL-MCNC: 7.1 G/DL (ref 6.1–7.9)
RBC # BLD AUTO: 4.59 10*6/MM3 (ref 4.14–5.8)
SODIUM BLD-SCNC: 129 MMOL/L (ref 136–144)
WBC NRBC COR # BLD: 8.1 10*3/MM3 (ref 3.4–10.8)
WHOLE BLOOD HOLD SPECIMEN: NORMAL
WHOLE BLOOD HOLD SPECIMEN: NORMAL

## 2019-10-13 PROCEDURE — 90715 TDAP VACCINE 7 YRS/> IM: CPT | Performed by: NURSE PRACTITIONER

## 2019-10-13 PROCEDURE — 99283 EMERGENCY DEPT VISIT LOW MDM: CPT

## 2019-10-13 PROCEDURE — 90471 IMMUNIZATION ADMIN: CPT | Performed by: NURSE PRACTITIONER

## 2019-10-13 PROCEDURE — 96365 THER/PROPH/DIAG IV INF INIT: CPT

## 2019-10-13 PROCEDURE — 25010000003 LEVETIRACETAM IN NACL 0.75% 1000 MG/100ML SOLUTION: Performed by: NURSE PRACTITIONER

## 2019-10-13 PROCEDURE — 25010000002 CEFTRIAXONE PER 250 MG: Performed by: NURSE PRACTITIONER

## 2019-10-13 PROCEDURE — 25010000002 ONDANSETRON PER 1 MG: Performed by: NURSE PRACTITIONER

## 2019-10-13 PROCEDURE — 96375 TX/PRO/DX INJ NEW DRUG ADDON: CPT

## 2019-10-13 PROCEDURE — 73090 X-RAY EXAM OF FOREARM: CPT

## 2019-10-13 PROCEDURE — 99284 EMERGENCY DEPT VISIT MOD MDM: CPT

## 2019-10-13 PROCEDURE — 96361 HYDRATE IV INFUSION ADD-ON: CPT

## 2019-10-13 PROCEDURE — 25010000002 TDAP 5-2.5-18.5 LF-MCG/0.5 SUSPENSION: Performed by: NURSE PRACTITIONER

## 2019-10-13 PROCEDURE — 85025 COMPLETE CBC W/AUTO DIFF WBC: CPT | Performed by: NURSE PRACTITIONER

## 2019-10-13 PROCEDURE — 80053 COMPREHEN METABOLIC PANEL: CPT | Performed by: NURSE PRACTITIONER

## 2019-10-13 PROCEDURE — 25010000002 MORPHINE PER 10 MG: Performed by: NURSE PRACTITIONER

## 2019-10-13 RX ORDER — MORPHINE SULFATE 4 MG/ML
2 INJECTION, SOLUTION INTRAMUSCULAR; INTRAVENOUS ONCE
Status: COMPLETED | OUTPATIENT
Start: 2019-10-13 | End: 2019-10-13

## 2019-10-13 RX ORDER — SODIUM CHLORIDE 0.9 % (FLUSH) 0.9 %
10 SYRINGE (ML) INJECTION AS NEEDED
Status: DISCONTINUED | OUTPATIENT
Start: 2019-10-13 | End: 2019-10-13 | Stop reason: HOSPADM

## 2019-10-13 RX ORDER — ONDANSETRON 2 MG/ML
4 INJECTION INTRAMUSCULAR; INTRAVENOUS ONCE
Status: COMPLETED | OUTPATIENT
Start: 2019-10-13 | End: 2019-10-13

## 2019-10-13 RX ORDER — AMOXICILLIN AND CLAVULANATE POTASSIUM 875; 125 MG/1; MG/1
1 TABLET, FILM COATED ORAL 2 TIMES DAILY
Qty: 20 TABLET | Refills: 0 | Status: SHIPPED | OUTPATIENT
Start: 2019-10-13 | End: 2019-10-23

## 2019-10-13 RX ORDER — RISPERIDONE 0.5 MG/1
0.5 TABLET ORAL
COMMUNITY
Start: 2019-10-02 | End: 2020-04-09

## 2019-10-13 RX ORDER — LEVETIRACETAM 10 MG/ML
1000 INJECTION INTRAVASCULAR ONCE
Status: COMPLETED | OUTPATIENT
Start: 2019-10-13 | End: 2019-10-13

## 2019-10-13 RX ADMIN — ONDANSETRON 4 MG: 2 INJECTION INTRAMUSCULAR; INTRAVENOUS at 14:38

## 2019-10-13 RX ADMIN — MORPHINE SULFATE 2 MG: 4 INJECTION INTRAVENOUS at 14:37

## 2019-10-13 RX ADMIN — SODIUM CHLORIDE 1000 ML: 900 INJECTION, SOLUTION INTRAVENOUS at 16:03

## 2019-10-13 RX ADMIN — CEFTRIAXONE SODIUM 1 G: 10 INJECTION, POWDER, FOR SOLUTION INTRAVENOUS at 16:06

## 2019-10-13 RX ADMIN — TETANUS TOXOID, REDUCED DIPHTHERIA TOXOID AND ACELLULAR PERTUSSIS VACCINE, ADSORBED 0.5 ML: 5; 2.5; 8; 8; 2.5 SUSPENSION INTRAMUSCULAR at 14:36

## 2019-10-13 RX ADMIN — LEVETIRACETAM 1000 MG: 10 INJECTION INTRAVENOUS at 14:36

## 2019-10-13 NOTE — DISCHARGE INSTRUCTIONS
It is important that you follow-up with the kidney specialist and take your seizure medication.  Take all of your antibiotics, even if you feel better.  Cleanse wound 3 times daily with antibacterial soap and water.  Okay to cover if in dirty environment, otherwise leave open to air.  Follow-up with PCP tomorrow for wound recheck.  Return to the ER for new or worsening symptoms.

## 2019-10-13 NOTE — ED PROVIDER NOTES
"Subjective   49-year-old male presents with multiple dog bites to bilateral upper extremities and a seizure status post \"breaking up a dog fight between my dog and friends that were watching\".  Patient does have a history of seizures and reports he was unable to take his noon dose of medication.     1. Location: Bilateral upper extremities  2. Quality: Throbbing  3. Severity: Moderate  4. Worsening factors: Palpation, rotation of forearm  5. Alleviating factors: Denies  6. Onset: Prior to arrival  7. Radiation: Denies  8. Frequency: Periods of intensity  9. Co-morbidities: Alcohol abuse, depression, head contusion, traumatic head injury, hypertension, migraine, pneumonia, seizures, suicidal ideation  10. Source: Patient                    Review of Systems   Constitutional: Negative for chills, diaphoresis and fever.   HENT: Negative for ear discharge and rhinorrhea.    Eyes: Negative for photophobia, pain and visual disturbance.   Musculoskeletal: Negative for gait problem.   Skin: Positive for wound. Negative for color change, pallor and rash.   Neurological: Positive for seizures. Negative for dizziness, speech difficulty, weakness, numbness and headaches.   Psychiatric/Behavioral: Negative for agitation and confusion.   All other systems reviewed and are negative.            Review of Systems    Past Medical History:   Diagnosis Date   • Alcohol abuse 7/29/2019   • Depression    • Head contusion 7/29/2019   • History of traumatic head injury 10/26/2018   • Hypertension    • Migraine    • Pneumonia    • Seizures (CMS/[facility])    • Suicidal ideation 8/7/2019       Allergies   Allergen Reactions   • Codeine Unknown (See Comments)     Pt is unsure       Past Surgical History:   Procedure Laterality Date   • HERNIA REPAIR         No family history on file.    Social History     Socioeconomic History   • Marital status: Single     Spouse name: Not on file   • Number of children: Not on file   • Years of education: Not on " file   • Highest education level: Not on file   Tobacco Use   • Smoking status: Never Smoker   • Smokeless tobacco: Never Used   Substance and Sexual Activity   • Alcohol use: Yes     Alcohol/week: 3.6 oz     Types: 6 Cans of beer per week     Comment: on weekends   • Drug use: No   • Sexual activity: Yes           Objective   Physical Exam  Physical Exam   Constitutional: He is oriented to person, place, and time. He appears well-developed and well-nourished. No distress.   HENT:   Head: Normocephalic and atraumatic.   Right Ear: External ear normal.   Left Ear: External ear normal.   Nose: Nose normal.   Mouth/Throat: Oropharynx is clear and moist.   Eyes: Conjunctivae and EOM are normal. Pupils are equal, round, and reactive to light.   Neck: Trachea normal, normal range of motion, full passive range of motion without pain and phonation normal. Neck supple. No JVD present. No spinous process tenderness present. No neck rigidity. No erythema and normal range of motion present. No Brudzinski's sign and no Kernig's sign noted.   Cardiovascular: Normal rate, regular rhythm, S1 normal, S2 normal, normal heart sounds, intact distal pulses and normal pulses. Exam reveals no gallop and no friction rub.   No murmur heard.  Pulmonary/Chest: Effort normal and breath sounds normal. No stridor. No respiratory distress. He has no wheezes. He has no rales. He exhibits no tenderness.   Abdominal: Soft. Bowel sounds are normal. He exhibits no distension and no mass. There is no tenderness. There is no rebound and no guarding. No hernia.   Musculoskeletal: Normal range of motion.   Neurological: He is alert and oriented to person, place, and time. He has normal strength. No cranial nerve deficit or sensory deficit. He exhibits normal muscle tone. GCS eye subscore is 4. GCS verbal subscore is 5. GCS motor subscore is 6.   Skin: Skin is warm and dry. Capillary refill takes less than 2 seconds.    Procedures           ED Course  ED  Course as of Oct 13 1712   Sun Oct 13, 2019   1507 Previously 130.  Sodium: (!) 129 [AL]   1507 Previously 92 Chloride: (!) 94 [AL]   1545 Patient's IV line infiltrated. Nurse is restarting and administering IVFs and Keppra.   [AL]      ED Course User Index  [AL] Melanie Zepeda, NP      Xr Forearm 2 View Left    Result Date: 10/13/2019   1. Soft tissue swelling and air at the distal forearm and wrist, without radiographic evidence of acute fracture or dislocation. 2. Mild to moderate multifocal DJD at the wrist and thumb.  Electronically Signed By-Fabian Calix On:10/13/2019 2:29 PM This report was finalized on [id number] by  Fabian Calix, .    Medications   sodium chloride 0.9 % flush 10 mL (not administered)   Tdap (BOOSTRIX) injection 0.5 mL (0.5 mL Intramuscular Given 10/13/19 1436)   levETIRAcetam in NaCl 0.75% (KEPPRA) IVPB 1,000 mg (0 mg Intravenous Stopped 10/13/19 1604)   Morphine sulfate (PF) injection 2 mg (2 mg Intravenous Given 10/13/19 1437)   ondansetron (ZOFRAN) injection 4 mg (4 mg Intravenous Given 10/13/19 1438)   sodium chloride 0.9 % bolus 1,000 mL (0 mL Intravenous Stopped 10/13/19 1706)   cefTRIAXone (ROCEPHIN) in SWFI 1 gram/10ml IV PUSH syringe (1 g Intravenous Given 10/13/19 1606)     Labs Reviewed   COMPREHENSIVE METABOLIC PANEL - Abnormal; Notable for the following components:       Result Value    Glucose 109 (*)     BUN 5 (*)     Sodium 129 (*)     Chloride 94 (*)     Calcium 8.7 (*)     ALT (SGPT) 15 (*)     Alkaline Phosphatase 114 (*)     BUN/Creatinine Ratio 5.6 (*)     Anion Gap 15.2 (*)     All other components within normal limits   CBC WITH AUTO DIFFERENTIAL - Abnormal; Notable for the following components:    MCHC 35.8 (*)     Neutrophil % 82.6 (*)     Lymphocyte % 8.4 (*)     All other components within normal limits   RAINBOW DRAW    Narrative:     The following orders were created for panel order [place] Draw.  Procedure                               Abnormality          Status                     ---------                               -----------         ------                     Light Blue Top[[id number]]                                   Final result               Green Top (Gel)[[id number]]                                  Final result               Lavender Top[[id number]]                                     Final result               Gold Top - SST[[id number]]                                   Final result                 Please view results for these tests on the individual orders.   POCT GLUCOSE FINGERSTICK   CBC AND DIFFERENTIAL    Narrative:     The following orders were created for panel order CBC & Differential.  Procedure                               Abnormality         Status                     ---------                               -----------         ------                     CBC Auto Differential[[id number]]        Abnormal            Final result                 Please view results for these tests on the individual orders.   LIGHT BLUE TOP   GREEN TOP   LAVENDER TOP   GOLD TOP - SST                 MDM  Number of Diagnoses or Management Options  Contusion of left upper extremity, initial encounter:   Dog bite, initial encounter:   Hypochloremia:   Hyponatremia:   Seizure (CMS/HCC):   Diagnosis management comments: Chart Review: Patient last seen 10/2/2019 for seizure.   Comorbidity: Alcohol abuse, depression, head contusion, traumatic head injury, hypertension, migraine, pneumonia, seizures, suicidal ideation  Imaging: Was interpreted by physician and reviewed by myself: Xr Forearm 2 View Left    Result Date: 10/13/2019   1. Soft tissue swelling and air at the distal forearm and wrist, without radiographic evidence of acute fracture or dislocation. 2. Mild to moderate multifocal DJD at the wrist and thumb.  Electronically Signed By-Fabian Calix On:10/13/2019 2:29 PM This report was finalized on [id number] by  Fabian Calix, .    Disposition/Treatment: Discussed  results with patient, verbalized understanding.  Agreeable with plan of care.    Patient undressed and placed in gown for exam.  IV established and labs obtained.  Seizure protocol initiated.  Patient was given Keppra 1 g IV.  Patient given 1 L normal saline bolus, morphine 2 mg IV, and Zofran 4 mg IV.  Wounds were irrigated with 50-50 mixture of normal saline and Betadine.  50-50 mixture normal saline and Betadine soaked were performed for 30 minutes.  Patient was given Rocephin 1 g IV.  Patient was discharged home with prescription for Augmentin and follow-up with PCP in 1 day for wound recheck.  Encouraged to cleanse 3 times daily with antibacterial soap and water and keep open to air and less in dirty environment.  Encouraged to return to the ER for new or worsening symptoms. Patient reports that he is seeing a nephrologist for his recurrent hyponatremia and hypochloremia.             Amount and/or Complexity of Data Reviewed  Clinical lab tests: reviewed  Tests in the radiology section of CPT®: reviewed        Final diagnoses:   Seizure (CMS/HCC)   Dog bite, initial encounter   Hypochloremia   Hyponatremia   Contusion of left upper extremity, initial encounter              Melanie Zepeda, JAMAL  10/13/19 5035

## 2020-04-09 ENCOUNTER — APPOINTMENT (OUTPATIENT)
Dept: GENERAL RADIOLOGY | Facility: HOSPITAL | Age: 50
End: 2020-04-09

## 2020-04-09 ENCOUNTER — HOSPITAL ENCOUNTER (EMERGENCY)
Facility: HOSPITAL | Age: 50
Discharge: HOME | End: 2020-04-09
Admitting: EMERGENCY MEDICINE

## 2020-04-09 ENCOUNTER — APPOINTMENT (OUTPATIENT)
Dept: CT IMAGING | Facility: HOSPITAL | Age: 50
End: 2020-04-09

## 2020-04-09 VITALS
OXYGEN SATURATION: 99 % | HEART RATE: 79 BPM | TEMPERATURE: 98.7 F | RESPIRATION RATE: 18 BRPM | WEIGHT: 225 LBS | BODY MASS INDEX: 37.49 KG/M2 | DIASTOLIC BLOOD PRESSURE: 73 MMHG | SYSTOLIC BLOOD PRESSURE: 128 MMHG | HEIGHT: 65 IN

## 2020-04-09 DIAGNOSIS — F10.920 ALCOHOLIC INTOXICATION WITHOUT COMPLICATION (HCC): ICD-10-CM

## 2020-04-09 DIAGNOSIS — R56.9 SEIZURE (HCC): Primary | ICD-10-CM

## 2020-04-09 LAB
ALBUMIN SERPL-MCNC: 4.3 G/DL (ref 3.5–5.2)
ALBUMIN/GLOB SERPL: 1.4 G/DL
ALP SERPL-CCNC: 93 U/L (ref 39–117)
ALT SERPL W P-5'-P-CCNC: 14 U/L (ref 1–41)
AMPHET+METHAMPHET UR QL: NEGATIVE
ANION GAP SERPL CALCULATED.3IONS-SCNC: 14 MMOL/L (ref 5–15)
AST SERPL-CCNC: 20 U/L (ref 1–40)
BARBITURATES UR QL SCN: NEGATIVE
BASOPHILS # BLD AUTO: 0 10*3/MM3 (ref 0–0.2)
BASOPHILS NFR BLD AUTO: 0.6 % (ref 0–1.5)
BENZODIAZ UR QL SCN: NEGATIVE
BILIRUB SERPL-MCNC: 0.2 MG/DL (ref 0.2–1.2)
BUN BLD-MCNC: 6 MG/DL (ref 6–20)
BUN/CREAT SERPL: 6.5 (ref 7–25)
CALCIUM SPEC-SCNC: 8.7 MG/DL (ref 8.6–10.5)
CANNABINOIDS SERPL QL: NEGATIVE
CHLORIDE SERPL-SCNC: 102 MMOL/L (ref 98–107)
CO2 SERPL-SCNC: 23 MMOL/L (ref 22–29)
COCAINE UR QL: NEGATIVE
CREAT BLD-MCNC: 0.93 MG/DL (ref 0.76–1.27)
DEPRECATED RDW RBC AUTO: 40.3 FL (ref 37–54)
EOSINOPHIL # BLD AUTO: 0.2 10*3/MM3 (ref 0–0.4)
EOSINOPHIL NFR BLD AUTO: 2.9 % (ref 0.3–6.2)
ERYTHROCYTE [DISTWIDTH] IN BLOOD BY AUTOMATED COUNT: 13 % (ref 12.3–15.4)
ETHANOL UR QL: 0.2 %
GFR SERPL CREATININE-BSD FRML MDRD: 86 ML/MIN/1.73
GLOBULIN UR ELPH-MCNC: 3 GM/DL
GLUCOSE BLD-MCNC: 108 MG/DL (ref 65–99)
HCT VFR BLD AUTO: 38.7 % (ref 37.5–51)
HGB BLD-MCNC: 13.8 G/DL (ref 13–17.7)
LYMPHOCYTES # BLD AUTO: 1.4 10*3/MM3 (ref 0.7–3.1)
LYMPHOCYTES NFR BLD AUTO: 20 % (ref 19.6–45.3)
MCH RBC QN AUTO: 31.4 PG (ref 26.6–33)
MCHC RBC AUTO-ENTMCNC: 35.7 G/DL (ref 31.5–35.7)
MCV RBC AUTO: 87.8 FL (ref 79–97)
METHADONE UR QL SCN: NEGATIVE
MONOCYTES # BLD AUTO: 0.4 10*3/MM3 (ref 0.1–0.9)
MONOCYTES NFR BLD AUTO: 6.2 % (ref 5–12)
NEUTROPHILS # BLD AUTO: 4.8 10*3/MM3 (ref 1.7–7)
NEUTROPHILS NFR BLD AUTO: 70.3 % (ref 42.7–76)
NRBC BLD AUTO-RTO: 0 /100 WBC (ref 0–0.2)
OPIATES UR QL: NEGATIVE
OXYCODONE UR QL SCN: NEGATIVE
PLATELET # BLD AUTO: 247 10*3/MM3 (ref 140–450)
PMV BLD AUTO: 6.8 FL (ref 6–12)
POTASSIUM BLD-SCNC: 3.8 MMOL/L (ref 3.5–5.2)
PROT SERPL-MCNC: 7.3 G/DL (ref 6–8.5)
RBC # BLD AUTO: 4.41 10*6/MM3 (ref 4.14–5.8)
SODIUM BLD-SCNC: 139 MMOL/L (ref 136–145)
WBC NRBC COR # BLD: 6.9 10*3/MM3 (ref 3.4–10.8)

## 2020-04-09 PROCEDURE — 80307 DRUG TEST PRSMV CHEM ANLYZR: CPT | Performed by: NURSE PRACTITIONER

## 2020-04-09 PROCEDURE — 96367 TX/PROPH/DG ADDL SEQ IV INF: CPT

## 2020-04-09 PROCEDURE — 85025 COMPLETE CBC W/AUTO DIFF WBC: CPT | Performed by: NURSE PRACTITIONER

## 2020-04-09 PROCEDURE — 25010000002 MAGNESIUM SULFATE PER 500 MG OF MAGNESIUM: Performed by: NURSE PRACTITIONER

## 2020-04-09 PROCEDURE — 99284 EMERGENCY DEPT VISIT MOD MDM: CPT

## 2020-04-09 PROCEDURE — 70486 CT MAXILLOFACIAL W/O DYE: CPT

## 2020-04-09 PROCEDURE — 96365 THER/PROPH/DIAG IV INF INIT: CPT

## 2020-04-09 PROCEDURE — 80053 COMPREHEN METABOLIC PANEL: CPT | Performed by: NURSE PRACTITIONER

## 2020-04-09 PROCEDURE — 70450 CT HEAD/BRAIN W/O DYE: CPT

## 2020-04-09 PROCEDURE — 25010000003 LEVETIRACETAM IN NACL 0.75% 1000 MG/100ML SOLUTION: Performed by: NURSE PRACTITIONER

## 2020-04-09 PROCEDURE — 25010000002 THIAMINE PER 100 MG: Performed by: NURSE PRACTITIONER

## 2020-04-09 PROCEDURE — 72110 X-RAY EXAM L-2 SPINE 4/>VWS: CPT

## 2020-04-09 PROCEDURE — 72125 CT NECK SPINE W/O DYE: CPT

## 2020-04-09 RX ORDER — LAMOTRIGINE 25 MG/1
100 TABLET ORAL 2 TIMES DAILY
COMMUNITY

## 2020-04-09 RX ORDER — NABUMETONE 500 MG/1
500 TABLET, FILM COATED ORAL 2 TIMES DAILY PRN
COMMUNITY

## 2020-04-09 RX ORDER — GABAPENTIN 400 MG/1
400 CAPSULE ORAL 3 TIMES DAILY
COMMUNITY

## 2020-04-09 RX ORDER — FUROSEMIDE 40 MG/1
40 TABLET ORAL 2 TIMES DAILY
COMMUNITY

## 2020-04-09 RX ORDER — ESCITALOPRAM OXALATE 10 MG/1
10 TABLET ORAL DAILY
COMMUNITY

## 2020-04-09 RX ORDER — LEVETIRACETAM 10 MG/ML
1000 INJECTION INTRAVASCULAR ONCE
Status: COMPLETED | OUTPATIENT
Start: 2020-04-09 | End: 2020-04-09

## 2020-04-09 RX ORDER — SODIUM CHLORIDE 1000 MG
1 TABLET, SOLUBLE MISCELLANEOUS 3 TIMES DAILY
COMMUNITY

## 2020-04-09 RX ORDER — CYCLOBENZAPRINE HCL 10 MG
10 TABLET ORAL 2 TIMES DAILY PRN
COMMUNITY

## 2020-04-09 RX ORDER — FOLIC ACID 1 MG/1
1 TABLET ORAL DAILY
COMMUNITY

## 2020-04-09 RX ORDER — OLANZAPINE 10 MG/1
10 TABLET ORAL NIGHTLY
COMMUNITY

## 2020-04-09 RX ORDER — HYDROCODONE BITARTRATE AND ACETAMINOPHEN 7.5; 325 MG/1; MG/1
1 TABLET ORAL EVERY 6 HOURS PRN
COMMUNITY

## 2020-04-09 RX ORDER — SODIUM CHLORIDE 0.9 % (FLUSH) 0.9 %
10 SYRINGE (ML) INJECTION AS NEEDED
Status: DISCONTINUED | OUTPATIENT
Start: 2020-04-09 | End: 2020-04-10 | Stop reason: HOSPADM

## 2020-04-09 RX ADMIN — FOLIC ACID 1000 ML/HR: 5 INJECTION, SOLUTION INTRAMUSCULAR; INTRAVENOUS; SUBCUTANEOUS at 22:29

## 2020-04-09 RX ADMIN — LEVETIRACETAM 1000 MG: 10 INJECTION INTRAVENOUS at 21:59

## 2020-04-10 NOTE — ED PROVIDER NOTES
"Subjective   Patient is a 50-year-old obese white male with history of alcohol abuse, hypertension, seizure disorder who is brought in by EMS today from home with reports of multiple seizures today with falls.  Patient states he has been drinking alcohol today and states he has had 5-6 beers.  He states he drinks every day \"to take the pain away.\"  Complains of chronic pain in his lower back.  He states he is awaiting surgery.  States he was prescribed hydrocodone but it does not help so he drinks alcohol to take his pain away.  States he has been taking his seizure medication as prescribed.  Today complains of pain in his lower back radiates into both of his legs.  Denies any numbness tingling or weakness in either lower extremity.  He denies any bowel or bladder dysfunction or saddle anesthesia.  He also complains of pain in the right side of his face from the fall today after seizure.  EMS reports that patient's wife stated patient has had approximately 8 seizures today all lasting approximately 30 seconds.  Patient denies any neck pain, chest pain shortness of breath abdominal pain nausea vomiting or diarrhea.  Denies any fever chills cough or congestion.          Review of Systems   Constitutional: Negative for chills and fever.   HENT: Negative for congestion.    Respiratory: Negative for cough and shortness of breath.    Cardiovascular: Negative for chest pain.   Gastrointestinal: Negative for abdominal pain, diarrhea, nausea and vomiting.   Genitourinary: Negative for decreased urine volume, difficulty urinating, dysuria, frequency and urgency.   Musculoskeletal: Positive for back pain. Negative for neck pain.   Neurological: Positive for seizures. Negative for dizziness, facial asymmetry, speech difficulty, weakness, light-headedness, numbness and headaches.       Past Medical History:   Diagnosis Date   • Alcohol abuse 7/29/2019   • Depression    • Head contusion 7/29/2019   • History of traumatic head " injury 10/26/2018   • Hypertension    • Migraine    • Pneumonia    • Seizures (CMS/HCC)    • Suicidal ideation 8/7/2019       Allergies   Allergen Reactions   • Codeine Unknown (See Comments)     Pt is unsure       Past Surgical History:   Procedure Laterality Date   • HERNIA REPAIR         History reviewed. No pertinent family history.    Social History     Socioeconomic History   • Marital status: Single     Spouse name: Not on file   • Number of children: Not on file   • Years of education: Not on file   • Highest education level: Not on file   Tobacco Use   • Smoking status: Never Smoker   • Smokeless tobacco: Never Used   Substance and Sexual Activity   • Alcohol use: Yes     Alcohol/week: 6.0 standard drinks     Types: 6 Cans of beer per week     Comment: on weekends   • Drug use: No   • Sexual activity: Yes           Objective   Physical Exam   Constitutional: He appears well-developed.   Vital signs and triage nurse note reviewed.  Constitutional: Awake, alert; well developed and well nourished. No acute distress, the patient is examined in hospital gown.  Smells of alcohol.  HEENT: Normocephalic; pupils are PERRL with intact EOM; oropharynx is pink and moist without edema or erythema.  There is tenderness bruising and swelling with minor superficial abrasion over the right cheek and lateral eyebrow.  No bleeding.  No crepitus.  No entrapment.  Sclera clear.  No hyphema.  Neck: Supple, full range of motion without pain; no cervical lymphadenopathy.  Cardiovascular: Regular rate and rhythm, normal S1-S2.  Pulmonary: Respiratory effort regular, nonlabored; breath sounds CTA without wheezing or rhonchi.  Abdomen: Soft, nontender, nondistended with normoactive bowel sounds; no rebound or guarding.  Musculoskeletal: Independent range of motion of all extremities, no palpable tenderness or edema.  Back: Spine is midline and with midline tenderness on palpation of lumbar spine.  No crepitus or step-off noted.;  paraspinal musculature is nontender and without soft tissue swelling, overlying ecchymosis or erythema. ROM elicits pain, Distal muscle strength is 5/5.  Neuro: Alert oriented x3, speech is clear and appropriate.  Patient appears intoxicated.  DTRs are symmetrical bilateral lower extremity, negative Babinski BLE, negative straight leg raise BLE, strong and equal dorsiflexion of bilateral great toes L4, L5, S1 motor sensory intact. No focal sensorimotor deficits noted. No facial asymmetry. Muscle strength 5/5 bilateral.  Cranial nerves 2-12 grossly intact. Normal coordination.  No gaze, deviation, or nystagmus.  Follows commands.          Procedures           ED Course      Labs Reviewed   COMPREHENSIVE METABOLIC PANEL - Abnormal; Notable for the following components:       Result Value    Glucose 108 (*)     BUN/Creatinine Ratio 6.5 (*)     All other components within normal limits    Narrative:     GFR Normal >60  Chronic Kidney Disease <60  Kidney Failure <15     URINE DRUG SCREEN - Normal    Narrative:     Negative Thresholds For Drugs Screened:     Amphetamines               500 ng/ml   Barbiturates               200 ng/ml   Benzodiazepines            100 ng/ml   Cocaine                    300 ng/ml   Methadone                  300 ng/ml   Opiates                    300 ng/ml   Oxycodone                  100 ng/ml   THC                        50 ng/ml    The Normal Value for all drugs tested is negative. This report includes final unconfirmed screening results to be used for medical treatment purposes only. Unconfirmed results must not be used for non-medical purposes such as employment or legal testing. Clinical consideration should be applied to any drug of abuse test, particulary when unconfirmed results are used.  All urine drugs of abuse requests without chain of custody are for medical screening purposes only.  False positives are possible.     CBC WITH AUTO DIFFERENTIAL - Normal   ETHANOL    Narrative:      Plasma Ethanol Clinical Symptoms:    ETOH (%)               Clinical Symptom  .01-.05              No apparent influence  .03-.12              Euphoria, Diminished judgment and attention   .09-.25              Impaired comprehension, Muscle incoordination  .18-.30              Confusion, Staggered gait, Slurred speech  .25-.40              Markedly decreased response to stimuli, unable to stand or                        walk, vomitting, sleep or stupor  .35-.50              Comatose, Anesthesia, Subnormal body temperature       CBC AND DIFFERENTIAL    Narrative:     The following orders were created for panel order CBC & Differential.  Procedure                               Abnormality         Status                     ---------                               -----------         ------                     CBC Auto Differential[[id number]]        Normal              Final result                 Please view results for these tests on the individual orders.     Ct Head Without Contrast    Result Date: 4/9/2020  1. No acute intracranial abnormality. Specifically, no evidence of hemorrhage, mass effect or midline shift. 2. Right-sided facial soft tissue swelling. 3. Complete opacification of the left maxillary sinus with additional patchy partial opacification of the right maxillary and ethmoid sinuses.  Electronically Signed ByRolando Singh On:4/9/2020 10:29 PM This report was finalized on [id number] by  Dorian Sanchez    Ct Cervical Spine Without Contrast    Result Date: 4/9/2020  1. No acute fracture or traumatic subluxation. 2. Mild multilevel degenerative disc disease most notably at C5-C6 with moderate neural foraminal narrowing  Electronically Signed ByRolando Singh On:4/9/2020 10:36 PM This report was finalized on [id number] by  Dorian Sanchez    Ct Facial Bones Without Contrast    Addendum Date: 4/9/2020    Typographical error in Impression #2. It should read: 2. Severe dental caries  involving a left maxillary molar with surrounding periapical lucency and possible oral antral communication. There is complete opacification of the left maxillary sinus which may be odontogenic sinusitis. Dentistry follow-up is recommended.  Electronically Signed By-Macario Singh On:4/9/2020 10:58 PM This report was finalized on [id number] by  Macario Singh, .    Result Date: 4/9/2020  1. Right-sided soft tissue swelling without underlying fracture. 2. Severe dental caries involving a left maxillary molar with surrounding periapical lucency and possible oral antral communication. There is complete opacification of the left maxillary sinus which may be odontogenic sinusitis. Demonstrate follow-up is recommended. 3. Postsurgical and old post traumatic findings of the left orbit.  Electronically Signed By-Macario Singh On:4/9/2020 10:45 PM This report was finalized on [id number] by  Macario Singh, .    Medications   sodium chloride 0.9 % flush 10 mL (has no administration in time range)   multiple vitamin (M.V.I. Adult) 10 mL, thiamine (B-1) 100 mg, folic acid 1 mg, magnesium sulfate 2 g in sodium chloride 0.9 % 1,000 mL infusion (1,000 mL/hr Intravenous New Bag 4/9/20 2229)   levETIRAcetam in NaCl 0.75% (KEPPRA) IVPB 1,000 mg (0 mg Intravenous Stopped 4/9/20 2226)                                          MDM  Number of Diagnoses or Management Options  Alcoholic intoxication without complication (CMS/HCC):   Seizure (CMS/HCC):   Diagnosis management comments: Comorbidities: Seizure disorder, alcohol abuse  Differentials: Alcohol intoxication, seizure, drug abuse, fracture, contusion, ICH;this list is not all inclusive and does not constitute the entirety of considered causes  Discussion with provider:  Radiology interpretation: X-rays reviewed by me and interpreted by radiologist: As above  Lab interpretation: Labs viewed by me significant for: As above    Patient is placed on continuous cardiac  monitor.  He had IV established.  He had seizure precautions initiated.  He had labs, x-ray and CTs obtained.  He was given 1 L banana bag.  He was also given 1 g of Keppra.    On reexamination, patient is resting comfortably no acute distress.  Remains awake alert and oriented neurologically stable.  He has had no seizure activity since arrival to ED.  He remains well-appearing and with stable vital signs.    Diagnosis and treatment plan discussed with patient.  Patient agreeable to plan.   I discussed findings with patient who voices understanding of discharge instructions, signs and symptoms requiring return to ED; discharged improved and in stable condition with follow up for re-evaluation.           Amount and/or Complexity of Data Reviewed  Clinical lab tests: ordered and reviewed  Tests in the radiology section of CPT®: ordered and reviewed    Patient Progress  Patient progress: stable      Final diagnoses:   Seizure (CMS/HCC)   Alcoholic intoxication without complication (CMS/HCC)            Palma Lawton APRN  04/09/20 [id number]

## 2020-04-10 NOTE — DISCHARGE INSTRUCTIONS
No more alcohol.  Drink plenty of fluids.  Continue to take your seizure medications as prescribed.  Follow-up with your primary care physician.  Return for new or worsening symptoms.

## 2020-04-10 NOTE — ED NOTES
"EMS reports pts S/O called for multiple seizures throughout the day, reports 8 total lasting an estimated 30 seconds. Pt reports he drank 5 beers today. Pt c/o facial, lower back, and neck pain. Swelling noted to R side of face.      Riana English LPN  04/09/20 2136    Pt reports he drinks daily, states \"I drink until the pain goes away\". Pt reports he drinks alcohol and takes narcotics.      Riana English LPN  04/09/20 2146    "

## 2020-05-31 ENCOUNTER — APPOINTMENT (OUTPATIENT)
Dept: CT IMAGING | Facility: HOSPITAL | Age: 50
End: 2020-05-31

## 2020-05-31 ENCOUNTER — HOSPITAL ENCOUNTER (EMERGENCY)
Facility: HOSPITAL | Age: 50
Discharge: HOME | End: 2020-05-31
Attending: EMERGENCY MEDICINE | Admitting: EMERGENCY MEDICINE

## 2020-05-31 VITALS
DIASTOLIC BLOOD PRESSURE: 78 MMHG | BODY MASS INDEX: 38.82 KG/M2 | HEART RATE: 86 BPM | OXYGEN SATURATION: 99 % | TEMPERATURE: 98.3 F | RESPIRATION RATE: 18 BRPM | WEIGHT: 233 LBS | SYSTOLIC BLOOD PRESSURE: 129 MMHG | HEIGHT: 65 IN

## 2020-05-31 DIAGNOSIS — F10.920 ALCOHOLIC INTOXICATION WITHOUT COMPLICATION (HCC): ICD-10-CM

## 2020-05-31 DIAGNOSIS — R56.9 SEIZURES (HCC): Primary | ICD-10-CM

## 2020-05-31 LAB
ALBUMIN SERPL-MCNC: 4.7 G/DL (ref 3.5–5.2)
ALBUMIN/GLOB SERPL: 1.5 G/DL
ALP SERPL-CCNC: 126 U/L (ref 39–117)
ALT SERPL W P-5'-P-CCNC: 15 U/L (ref 1–41)
ANION GAP SERPL CALCULATED.3IONS-SCNC: 18 MMOL/L (ref 5–15)
AST SERPL-CCNC: 19 U/L (ref 1–40)
BASOPHILS # BLD AUTO: 0.1 10*3/MM3 (ref 0–0.2)
BASOPHILS NFR BLD AUTO: 0.8 % (ref 0–1.5)
BILIRUB SERPL-MCNC: <0.2 MG/DL (ref 0.2–1.2)
BUN BLD-MCNC: 9 MG/DL (ref 6–20)
BUN/CREAT SERPL: 9.2 (ref 7–25)
CALCIUM SPEC-SCNC: 9.4 MG/DL (ref 8.6–10.5)
CHLORIDE SERPL-SCNC: 98 MMOL/L (ref 98–107)
CK SERPL-CCNC: 124 U/L (ref 20–200)
CO2 SERPL-SCNC: 19 MMOL/L (ref 22–29)
CREAT BLD-MCNC: 0.98 MG/DL (ref 0.76–1.27)
DEPRECATED RDW RBC AUTO: 43.8 FL (ref 37–54)
EOSINOPHIL # BLD AUTO: 0.4 10*3/MM3 (ref 0–0.4)
EOSINOPHIL NFR BLD AUTO: 4.7 % (ref 0.3–6.2)
ERYTHROCYTE [DISTWIDTH] IN BLOOD BY AUTOMATED COUNT: 14 % (ref 12.3–15.4)
ETHANOL UR QL: 0.23 %
GFR SERPL CREATININE-BSD FRML MDRD: 81 ML/MIN/1.73
GLOBULIN UR ELPH-MCNC: 3.2 GM/DL
GLUCOSE BLD-MCNC: 99 MG/DL (ref 65–99)
HCT VFR BLD AUTO: 44.6 % (ref 37.5–51)
HGB BLD-MCNC: 15.5 G/DL (ref 13–17.7)
LYMPHOCYTES # BLD AUTO: 2 10*3/MM3 (ref 0.7–3.1)
LYMPHOCYTES NFR BLD AUTO: 26.3 % (ref 19.6–45.3)
MAGNESIUM SERPL-MCNC: 2.4 MG/DL (ref 1.6–2.6)
MCH RBC QN AUTO: 31 PG (ref 26.6–33)
MCHC RBC AUTO-ENTMCNC: 34.8 G/DL (ref 31.5–35.7)
MCV RBC AUTO: 88.9 FL (ref 79–97)
MONOCYTES # BLD AUTO: 0.6 10*3/MM3 (ref 0.1–0.9)
MONOCYTES NFR BLD AUTO: 7.9 % (ref 5–12)
NEUTROPHILS # BLD AUTO: 4.5 10*3/MM3 (ref 1.7–7)
NEUTROPHILS NFR BLD AUTO: 60.3 % (ref 42.7–76)
NRBC BLD AUTO-RTO: 0.1 /100 WBC (ref 0–0.2)
PLATELET # BLD AUTO: 238 10*3/MM3 (ref 140–450)
PMV BLD AUTO: 7 FL (ref 6–12)
POTASSIUM BLD-SCNC: 3.8 MMOL/L (ref 3.5–5.2)
PROT SERPL-MCNC: 7.9 G/DL (ref 6–8.5)
RBC # BLD AUTO: 5.01 10*6/MM3 (ref 4.14–5.8)
SODIUM BLD-SCNC: 135 MMOL/L (ref 136–145)
TROPONIN T SERPL-MCNC: <0.01 NG/ML (ref 0–0.03)
WBC NRBC COR # BLD: 7.4 10*3/MM3 (ref 3.4–10.8)

## 2020-05-31 PROCEDURE — 72125 CT NECK SPINE W/O DYE: CPT

## 2020-05-31 PROCEDURE — 83735 ASSAY OF MAGNESIUM: CPT | Performed by: EMERGENCY MEDICINE

## 2020-05-31 PROCEDURE — 70450 CT HEAD/BRAIN W/O DYE: CPT

## 2020-05-31 PROCEDURE — 80307 DRUG TEST PRSMV CHEM ANLYZR: CPT | Performed by: EMERGENCY MEDICINE

## 2020-05-31 PROCEDURE — 82550 ASSAY OF CK (CPK): CPT | Performed by: EMERGENCY MEDICINE

## 2020-05-31 PROCEDURE — 80053 COMPREHEN METABOLIC PANEL: CPT | Performed by: EMERGENCY MEDICINE

## 2020-05-31 PROCEDURE — 25010000002 KETOROLAC TROMETHAMINE PER 15 MG: Performed by: EMERGENCY MEDICINE

## 2020-05-31 PROCEDURE — 84484 ASSAY OF TROPONIN QUANT: CPT | Performed by: EMERGENCY MEDICINE

## 2020-05-31 PROCEDURE — 99284 EMERGENCY DEPT VISIT MOD MDM: CPT

## 2020-05-31 PROCEDURE — 96374 THER/PROPH/DIAG INJ IV PUSH: CPT

## 2020-05-31 PROCEDURE — 85025 COMPLETE CBC W/AUTO DIFF WBC: CPT | Performed by: EMERGENCY MEDICINE

## 2020-05-31 PROCEDURE — 93005 ELECTROCARDIOGRAM TRACING: CPT | Performed by: EMERGENCY MEDICINE

## 2020-05-31 RX ORDER — KETOROLAC TROMETHAMINE 15 MG/ML
15 INJECTION, SOLUTION INTRAMUSCULAR; INTRAVENOUS ONCE
Status: COMPLETED | OUTPATIENT
Start: 2020-05-31 | End: 2020-05-31

## 2020-05-31 RX ORDER — LAMOTRIGINE 100 MG/1
100 TABLET ORAL ONCE
Status: COMPLETED | OUTPATIENT
Start: 2020-05-31 | End: 2020-05-31

## 2020-05-31 RX ADMIN — KETOROLAC TROMETHAMINE 15 MG: 15 INJECTION, SOLUTION INTRAMUSCULAR; INTRAVENOUS at 21:13

## 2020-05-31 RX ADMIN — LAMOTRIGINE 100 MG: 100 TABLET ORAL at 23:04

## 2020-06-01 NOTE — ED PROVIDER NOTES
Subjective   Chief complaint seizures head pain    History of present illness 50-year-old male with a history of seizures reported to have a couple seizures today that were normal typical myoclonic seizures who was brought to the hospital for evaluation the patient's had no seizure in route he complains of some headache.  He states this started after the seizure unsure if he had any injury.  Complains of some soreness in his chest and all over.  Long history of seizures he states since he was 13 years old no recent fever chills sweats cough congestion vomiting or diarrhea.  Patient does drink alcohol and has been drinking today.  He states a normal amount.  Stable in route no other complaints          Review of Systems   Constitutional: Negative for chills and fever.   HENT: Negative for congestion and sinus pressure.    Eyes: Negative for photophobia and visual disturbance.   Respiratory: Negative for chest tightness and shortness of breath.    Cardiovascular: Positive for chest pain.   Gastrointestinal: Negative for abdominal pain and vomiting.   Endocrine: Negative for cold intolerance and heat intolerance.   Musculoskeletal: Positive for neck pain. Negative for joint swelling.   Neurological: Positive for headaches. Negative for weakness.   Psychiatric/Behavioral: Negative for agitation and behavioral problems.       Past Medical History:   Diagnosis Date   • Alcohol abuse 7/29/2019   • Depression    • Head contusion 7/29/2019   • History of traumatic head injury 10/26/2018   • Hypertension    • Migraine    • Pneumonia    • Seizures (CMS/[facility])    • Suicidal ideation 8/7/2019       Allergies   Allergen Reactions   • Codeine Unknown (See Comments)     Pt is unsure       Past Surgical History:   Procedure Laterality Date   • HERNIA REPAIR         No family history on file.    Social History     Socioeconomic History   • Marital status: Single     Spouse name: Not on file   • Number of children: Not on file   • Years  of education: Not on file   • Highest education level: Not on file   Tobacco Use   • Smoking status: Never Smoker   • Smokeless tobacco: Never Used   Substance and Sexual Activity   • Alcohol use: Yes     Alcohol/week: 6.0 standard drinks     Types: 6 Cans of beer per week     Comment: on weekends   • Drug use: No   • Sexual activity: Yes     Prior to Admission medications    Medication Sig Start Date End Date Taking? Authorizing Provider   cyclobenzaprine (FLEXERIL) 10 MG tablet Take 10 mg by mouth 2 (Two) Times a Day As Needed for Muscle Spasms.    Paulina Wheeler MD   escitalopram (LEXAPRO) 10 MG tablet Take 10 mg by mouth Daily.    Paulina Wheeler MD   folic acid (FOLVITE) 1 MG tablet Take 1 mg by mouth Daily.    Paulina Wheeler MD   furosemide (LASIX) 40 MG tablet Take 40 mg by mouth 2 (Two) Times a Day.    Paulina Wheeler MD   gabapentin (NEURONTIN) 400 MG capsule Take 400 mg by mouth 3 (Three) Times a Day.    Paulina Wheeler MD   HYDROcodone-acetaminophen (NORCO) 7.5-325 MG per tablet Take 1 tablet by mouth Every 6 (Six) Hours As Needed for Moderate Pain .    Paulina Wheeler MD   lamoTRIgine (LaMICtal) 25 MG tablet Take 100 mg by mouth 2 (Two) Times a Day.    Paulina Wheeler MD   nabumetone (RELAFEN) 500 MG tablet Take 500 mg by mouth 2 (Two) Times a Day As Needed for Mild Pain .    Paulina Wheeler MD   OLANZapine (zyPREXA) 10 MG tablet Take 10 mg by mouth Every Night.    Paulina Wheeler MD   OXcarbazepine (TRILEPTAL) 300 MG tablet Take 3 tablets by mouth 2 (Two) Times a Day.  Patient taking differently: Take 600 mg by mouth 2 (Two) Times a Day. 7/30/19   Jesus Christensen DO   sodium chloride 1 g tablet Take 1 g by mouth 3 (Three) Times a Day.    Paulina Wheeler MD           Objective   Physical Exam  50-year-old awake alert no acute distress HEENT extraocular muscles intact pupils equal reactive there is no raccoon or hdz sign mild cervical spine  tenderness but no step-off or deformity no thoracic lumbar spine tenderness noted trachea midline chest wall nontender good breath sounds bilaterally heart regular without murmur abdomen soft no tenderness good bowel sounds extremities full range of motion no deformities or pain patient is awake alert follows commands no facial asymmetry normal speech moves everything without difficulty without focal weakness  Procedures           ED Course      Results for orders placed or performed during the hospital encounter of 05/31/20   Comprehensive Metabolic Panel   Result Value Ref Range    Glucose 99 65 - 99 mg/dL    BUN 9 6 - 20 mg/dL    Creatinine 0.98 0.76 - 1.27 mg/dL    Sodium 135 (L) 136 - 145 mmol/L    Potassium 3.8 3.5 - 5.2 mmol/L    Chloride 98 98 - 107 mmol/L    CO2 19.0 (L) 22.0 - 29.0 mmol/L    Calcium 9.4 8.6 - 10.5 mg/dL    Total Protein 7.9 6.0 - 8.5 g/dL    Albumin 4.70 3.50 - 5.20 g/dL    ALT (SGPT) 15 1 - 41 U/L    AST (SGOT) 19 1 - 40 U/L    Alkaline Phosphatase 126 (H) 39 - 117 U/L    Total Bilirubin <0.2 (L) 0.2 - 1.2 mg/dL    eGFR Non African Amer 81 >60 mL/min/1.73    Globulin 3.2 gm/dL    A/G Ratio 1.5 g/dL    BUN/Creatinine Ratio 9.2 7.0 - 25.0    Anion Gap 18.0 (H) 5.0 - 15.0 mmol/L   CK   Result Value Ref Range    Creatine Kinase 124 20 - 200 U/L   Ethanol   Result Value Ref Range    Ethanol % 0.234 %   Magnesium   Result Value Ref Range    Magnesium 2.4 1.6 - 2.6 mg/dL   CBC Auto Differential   Result Value Ref Range    WBC 7.40 3.40 - 10.80 10*3/mm3    RBC 5.01 4.14 - 5.80 10*6/mm3    Hemoglobin 15.5 13.0 - 17.7 g/dL    Hematocrit 44.6 37.5 - 51.0 %    MCV 88.9 79.0 - 97.0 fL    MCH 31.0 26.6 - 33.0 pg    MCHC 34.8 31.5 - 35.7 g/dL    RDW 14.0 12.3 - 15.4 %    RDW-SD 43.8 37.0 - 54.0 fl    MPV 7.0 6.0 - 12.0 fL    Platelets 238 140 - 450 10*3/mm3    Neutrophil % 60.3 42.7 - 76.0 %    Lymphocyte % 26.3 19.6 - 45.3 %    Monocyte % 7.9 5.0 - 12.0 %    Eosinophil % 4.7 0.3 - 6.2 %    Basophil %  0.8 0.0 - 1.5 %    Neutrophils, Absolute 4.50 1.70 - 7.00 10*3/mm3    Lymphocytes, Absolute 2.00 0.70 - 3.10 10*3/mm3    Monocytes, Absolute 0.60 0.10 - 0.90 10*3/mm3    Eosinophils, Absolute 0.40 0.00 - 0.40 10*3/mm3    Basophils, Absolute 0.10 0.00 - 0.20 10*3/mm3    nRBC 0.1 0.0 - 0.2 /100 WBC   Troponin   Result Value Ref Range    Troponin T <0.010 0.000 - 0.030 ng/mL     Ct Head Without Contrast    Result Date: 5/31/2020   1.  No acute intracranial abnormality detected. 2.  Mild volume loss. 3.  Moderate changes of chronic sinusitis.   Josr Carlson MD Neuroradiologist Thank you for this referral.  This exam was interpreted by a fellowship trained neuroradiologist.   SLOT:  68  Electronically signed by:  Josr Carlson  5/31/2020 8:06 PM    Ct Cervical Spine Without Contrast    Result Date: 5/31/2020   1.  No evidence of acute fracture or traumatic malalignment. 2.  Mild degenerative changes, as above.     Josr Carlson MD Neuroradiologist Thank you for this referral.  This exam was interpreted by a fellowship trained neuroradiologist.   SLOT:  68  Electronically signed by:  Josr Carlson  5/31/2020 8:10 PM    Medications   lamoTRIgine (LaMICtal) tablet 100 mg (has no administration in time range)   ketorolac (TORADOL) injection 15 mg (15 mg Intravenous Given 5/31/20 2113)          EKG my interpretation normal sinus rhythm rate of 87 normal axis no hypertrophy QTC of 440 normal EKG                                  MDM  Number of Diagnoses or Management Options  Alcoholic intoxication without complication (CMS/[facility]):   Seizures (CMS/[facility]):   Diagnosis management comments: Medical decision making.  Patient IV established prehospital he had the above exam evaluation was placed on a monitor patient given Toradol 15 mg IV for pain.  CBC electrolytes are unremarkable troponin is negative magnesium normal CK was normal alcohol was 0.234.  Chemistries unremarkable and a CO2 of 19 CT head without no acute  findings CT cervical spine no fracture.  Patient was given Lamictal 100 mg p.o. he had no further seizure activity was resting comfortably.  In no distress with unremarkable neurological exam.  Patient desires to go home discharged home stable condition I see no evidence of acute stroke or acute infection no evidence of acute cardiac ischemia.  No evidence of DVT or formal embolism or aortic dissection.  Looks well orientated x3 Hostetter Coma Scale 15      Final diagnoses:   Seizures (CMS/[facility])   Alcoholic intoxication without complication (CMS/[facility])            Willie Castillo MD  05/31/20 9609

## 2020-06-01 NOTE — DISCHARGE INSTRUCTIONS
No driving no heights no ladders.  Quit drinking alcohol  Return for fever vomiting increasing pain mental status changes unequal pupils increasing seizures or any other new or worse problems or concerns

## 2020-07-19 ENCOUNTER — APPOINTMENT (OUTPATIENT)
Dept: CT IMAGING | Facility: HOSPITAL | Age: 50
End: 2020-07-19

## 2020-07-19 ENCOUNTER — HOSPITAL ENCOUNTER (EMERGENCY)
Facility: HOSPITAL | Age: 50
Discharge: HOME | End: 2020-07-19
Attending: EMERGENCY MEDICINE | Admitting: EMERGENCY MEDICINE

## 2020-07-19 VITALS
WEIGHT: 235 LBS | HEART RATE: 98 BPM | DIASTOLIC BLOOD PRESSURE: 96 MMHG | RESPIRATION RATE: 15 BRPM | SYSTOLIC BLOOD PRESSURE: 137 MMHG | HEIGHT: 69 IN | TEMPERATURE: 98.6 F | OXYGEN SATURATION: 99 % | BODY MASS INDEX: 34.8 KG/M2

## 2020-07-19 DIAGNOSIS — S16.1XXA STRAIN OF NECK MUSCLE, INITIAL ENCOUNTER: ICD-10-CM

## 2020-07-19 DIAGNOSIS — F10.920 ALCOHOLIC INTOXICATION WITHOUT COMPLICATION (HCC): Primary | ICD-10-CM

## 2020-07-19 DIAGNOSIS — F44.5 PSEUDOSEIZURE: ICD-10-CM

## 2020-07-19 DIAGNOSIS — S09.90XA INJURY OF HEAD, INITIAL ENCOUNTER: ICD-10-CM

## 2020-07-19 LAB
ALBUMIN SERPL-MCNC: 4.8 G/DL (ref 3.5–5.2)
ALBUMIN/GLOB SERPL: 1.7 G/DL
ALP SERPL-CCNC: 112 U/L (ref 39–117)
ALT SERPL W P-5'-P-CCNC: 12 U/L (ref 1–41)
AMPHET+METHAMPHET UR QL: NEGATIVE
ANION GAP SERPL CALCULATED.3IONS-SCNC: 25 MMOL/L (ref 5–15)
APAP SERPL-MCNC: <5 MCG/ML (ref 10–30)
AST SERPL-CCNC: 19 U/L (ref 1–40)
BACTERIA UR QL AUTO: ABNORMAL /HPF
BARBITURATES UR QL SCN: NEGATIVE
BASOPHILS # BLD AUTO: 0 10*3/MM3 (ref 0–0.2)
BASOPHILS NFR BLD AUTO: 0.4 % (ref 0–1.5)
BENZODIAZ UR QL SCN: NEGATIVE
BILIRUB SERPL-MCNC: <0.2 MG/DL (ref 0–1.2)
BILIRUB UR QL STRIP: NEGATIVE
BUN SERPL-MCNC: 14 MG/DL (ref 6–20)
BUN SERPL-MCNC: ABNORMAL MG/DL
BUN/CREAT SERPL: ABNORMAL
CALCIUM SPEC-SCNC: 8.6 MG/DL (ref 8.6–10.5)
CANNABINOIDS SERPL QL: NEGATIVE
CHLORIDE SERPL-SCNC: 95 MMOL/L (ref 98–107)
CLARITY UR: CLEAR
CO2 SERPL-SCNC: 16 MMOL/L (ref 22–29)
COCAINE UR QL: NEGATIVE
COLOR UR: YELLOW
CREAT SERPL-MCNC: 1.04 MG/DL (ref 0.76–1.27)
DEPRECATED RDW RBC AUTO: 42 FL (ref 37–54)
EOSINOPHIL # BLD AUTO: 0.2 10*3/MM3 (ref 0–0.4)
EOSINOPHIL NFR BLD AUTO: 3 % (ref 0.3–6.2)
ERYTHROCYTE [DISTWIDTH] IN BLOOD BY AUTOMATED COUNT: 13.3 % (ref 12.3–15.4)
ETHANOL UR QL: 0.25 %
GFR SERPL CREATININE-BSD FRML MDRD: 76 ML/MIN/1.73
GLOBULIN UR ELPH-MCNC: 2.8 GM/DL
GLUCOSE BLDC GLUCOMTR-MCNC: 98 MG/DL (ref 70–105)
GLUCOSE SERPL-MCNC: 99 MG/DL (ref 65–99)
GLUCOSE UR STRIP-MCNC: NEGATIVE MG/DL
HCT VFR BLD AUTO: 41.6 % (ref 37.5–51)
HGB BLD-MCNC: 14.3 G/DL (ref 13–17.7)
HGB UR QL STRIP.AUTO: ABNORMAL
HYALINE CASTS UR QL AUTO: ABNORMAL /LPF
KETONES UR QL STRIP: NEGATIVE
LEUKOCYTE ESTERASE UR QL STRIP.AUTO: NEGATIVE
LYMPHOCYTES # BLD AUTO: 1.8 10*3/MM3 (ref 0.7–3.1)
LYMPHOCYTES NFR BLD AUTO: 23.2 % (ref 19.6–45.3)
MCH RBC QN AUTO: 30.6 PG (ref 26.6–33)
MCHC RBC AUTO-ENTMCNC: 34.4 G/DL (ref 31.5–35.7)
MCV RBC AUTO: 88.8 FL (ref 79–97)
METHADONE UR QL SCN: NEGATIVE
MONOCYTES # BLD AUTO: 0.7 10*3/MM3 (ref 0.1–0.9)
MONOCYTES NFR BLD AUTO: 8.8 % (ref 5–12)
NEUTROPHILS NFR BLD AUTO: 5 10*3/MM3 (ref 1.7–7)
NEUTROPHILS NFR BLD AUTO: 64.6 % (ref 42.7–76)
NITRITE UR QL STRIP: NEGATIVE
NRBC BLD AUTO-RTO: 0 /100 WBC (ref 0–0.2)
OPIATES UR QL: NEGATIVE
OXYCODONE UR QL SCN: NEGATIVE
PH UR STRIP.AUTO: 5.5 [PH] (ref 5–8)
PLATELET # BLD AUTO: 257 10*3/MM3 (ref 140–450)
PMV BLD AUTO: 6.6 FL (ref 6–12)
POTASSIUM SERPL-SCNC: 3.7 MMOL/L (ref 3.5–5.2)
PROT SERPL-MCNC: 7.6 G/DL (ref 6–8.5)
PROT UR QL STRIP: NEGATIVE
RBC # BLD AUTO: 4.68 10*6/MM3 (ref 4.14–5.8)
RBC # UR: ABNORMAL /HPF
REF LAB TEST METHOD: ABNORMAL
SALICYLATES SERPL-MCNC: <0.3 MG/DL
SODIUM SERPL-SCNC: 136 MMOL/L (ref 136–145)
SP GR UR STRIP: <=1.005 (ref 1–1.03)
SQUAMOUS #/AREA URNS HPF: ABNORMAL /HPF
UROBILINOGEN UR QL STRIP: ABNORMAL
WBC # BLD AUTO: 7.7 10*3/MM3 (ref 3.4–10.8)
WBC UR QL AUTO: ABNORMAL /HPF

## 2020-07-19 PROCEDURE — 25010000002 THIAMINE PER 100 MG: Performed by: EMERGENCY MEDICINE

## 2020-07-19 PROCEDURE — 25010000002 ZIPRASIDONE MESYLATE PER 10 MG

## 2020-07-19 PROCEDURE — 80307 DRUG TEST PRSMV CHEM ANLYZR: CPT | Performed by: EMERGENCY MEDICINE

## 2020-07-19 PROCEDURE — 72125 CT NECK SPINE W/O DYE: CPT

## 2020-07-19 PROCEDURE — 82962 GLUCOSE BLOOD TEST: CPT

## 2020-07-19 PROCEDURE — 96372 THER/PROPH/DIAG INJ SC/IM: CPT

## 2020-07-19 PROCEDURE — 85025 COMPLETE CBC W/AUTO DIFF WBC: CPT | Performed by: EMERGENCY MEDICINE

## 2020-07-19 PROCEDURE — 70450 CT HEAD/BRAIN W/O DYE: CPT

## 2020-07-19 PROCEDURE — 99284 EMERGENCY DEPT VISIT MOD MDM: CPT

## 2020-07-19 PROCEDURE — 25010000002 MAGNESIUM SULFATE PER 500 MG OF MAGNESIUM: Performed by: EMERGENCY MEDICINE

## 2020-07-19 PROCEDURE — 80053 COMPREHEN METABOLIC PANEL: CPT | Performed by: EMERGENCY MEDICINE

## 2020-07-19 PROCEDURE — 81001 URINALYSIS AUTO W/SCOPE: CPT | Performed by: EMERGENCY MEDICINE

## 2020-07-19 PROCEDURE — 96365 THER/PROPH/DIAG IV INF INIT: CPT

## 2020-07-19 RX ORDER — ZIPRASIDONE MESYLATE 20 MG/ML
INJECTION, POWDER, LYOPHILIZED, FOR SOLUTION INTRAMUSCULAR
Status: COMPLETED
Start: 2020-07-19 | End: 2020-07-19

## 2020-07-19 RX ORDER — ZIPRASIDONE MESYLATE 20 MG/ML
20 INJECTION, POWDER, LYOPHILIZED, FOR SOLUTION INTRAMUSCULAR ONCE
Status: COMPLETED | OUTPATIENT
Start: 2020-07-19 | End: 2020-07-19

## 2020-07-19 RX ORDER — WATER 1000 ML/1000ML
INJECTION, SOLUTION INTRAVENOUS
Status: DISCONTINUED
Start: 2020-07-19 | End: 2020-07-19 | Stop reason: HOSPADM

## 2020-07-19 RX ADMIN — ZIPRASIDONE MESYLATE 20 MG: 20 INJECTION, POWDER, LYOPHILIZED, FOR SOLUTION INTRAMUSCULAR at 03:50

## 2020-07-19 RX ADMIN — MAGNESIUM SULFATE HEPTAHYDRATE 1000 ML/HR: 500 INJECTION, SOLUTION INTRAMUSCULAR; INTRAVENOUS at 05:36

## 2020-07-19 NOTE — ED PROVIDER NOTES
Subjective   50-year-old intoxicated male with a history of seizures and pseudoseizures transported by EMS for possible recurrent seizures.  EMS reports patient was on the floor shaking upon their arrival.  Versed 5 mg IM total was given.  Patient is alert, yelling profanity at staff reports that he faked the whole thing and again complains of suicidal ideation.  This encounter is very similar to the prior encounters I have had with this individual.  Patient has no specific plan regarding his suicidal ideation.  Patient has chronic back pain, unchanged.          Review of Systems   Unable to perform ROS: Mental status change       Past Medical History:   Diagnosis Date   • Alcohol abuse 7/29/2019   • Depression    • Head contusion 7/29/2019   • History of traumatic head injury 10/26/2018   • Hypertension    • Migraine    • Pneumonia    • Seizures (CMS/HCC)    • Suicidal ideation 8/7/2019       Allergies   Allergen Reactions   • Codeine Unknown (See Comments)     Pt is unsure       Past Surgical History:   Procedure Laterality Date   • HERNIA REPAIR         No family history on file.    Social History     Socioeconomic History   • Marital status: Single     Spouse name: Not on file   • Number of children: Not on file   • Years of education: Not on file   • Highest education level: Not on file   Tobacco Use   • Smoking status: Never Smoker   • Smokeless tobacco: Never Used   Substance and Sexual Activity   • Alcohol use: Yes     Alcohol/week: 6.0 standard drinks     Types: 6 Cans of beer per week     Comment: on weekends   • Drug use: No   • Sexual activity: Yes           Objective   Physical Exam   Constitutional: He is oriented to person, place, and time. He appears well-developed and well-nourished.   HENT:   Head: Normocephalic and atraumatic.   Mouth/Throat: Oropharynx is clear and moist.   No tongue abrasion seen   Eyes: Pupils are equal, round, and reactive to light. Conjunctivae and EOM are normal.   Neck:    Nontender, pain with decreased range of motion   Cardiovascular: Normal rate, regular rhythm, normal heart sounds and intact distal pulses.   Pulmonary/Chest: Effort normal and breath sounds normal.   Abdominal: Soft. Bowel sounds are normal. He exhibits no distension. There is no tenderness.   Musculoskeletal: Normal range of motion. He exhibits no edema or tenderness.   Neurological: He is alert and oriented to person, place, and time.   Skin: Skin is warm and dry. Capillary refill takes less than 2 seconds.   Psychiatric:   Irritable, rude, reports SI, mild clinical alcohol intoxication       Procedures           ED Course                                           MDM  Number of Diagnoses or Management Options  Alcoholic intoxication without complication (CMS/[facility]):   Injury of head, initial encounter:   Pseudoseizure:   Strain of neck muscle, initial encounter:   Diagnosis management comments: Results for orders placed or performed during the hospital encounter of 07/19/20  -Comprehensive Metabolic Panel       Result                      Value             Ref Range           Glucose                     99                65 - 99 mg/dL       BUN                                                               Creatinine                  1.04              0.76 - 1.27 *       Sodium                      136               136 - 145 mm*       Potassium                   3.7               3.5 - 5.2 mm*       Chloride                    95 (L)            98 - 107 mmo*       CO2                         16.0 (L)          22.0 - 29.0 *       Calcium                     8.6               8.6 - 10.5 m*       Total Protein               7.6               6.0 - 8.5 g/*       Albumin                     4.80              3.50 - 5.20 *       ALT (SGPT)                  12                1 - 41 U/L          AST (SGOT)                  19                1 - 40 U/L          Alkaline Phosphatase        112               39 - 117 U/L         Total Bilirubin             <0.2              0.0 - 1.2 mg*       eGFR Non African Amer       76                >60 mL/min/1*       Globulin                    2.8               gm/dL               A/G Ratio                   1.7               g/dL                BUN/Creatinine Ratio                                              Anion Gap                   25.0 (H)          5.0 - 15.0 m*  -Ethanol       Result                      Value             Ref Range           Ethanol %                   0.252             %              -Acetaminophen Level       Result                      Value             Ref Range           Acetaminophen               <5.0 (L)          10.0 - 30.0 *  -Urine Drug Screen - Urine, Clean Catch       Result                      Value             Ref Range           Amphet/Methamphet, Scr*     Negative          Negative            Barbiturates Screen, U*     Negative          Negative            Benzodiazepine Screen,*     Negative          Negative            Cocaine Screen, Urine       Negative          Negative            Opiate Screen               Negative          Negative            THC, Screen, Urine          Negative          Negative            Methadone Screen, Urine     Negative          Negative            Oxycodone Screen, Urine     Negative          Negative       -Salicylate Level       Result                      Value             Ref Range           Salicylate                  <0.3              <=30.0 mg/dL   -Urinalysis With Culture If Indicated - Urine, Clean Catch       Result                      Value             Ref Range           Color, UA                   Yellow            Yellow, Straw       Appearance, UA              Clear             Clear               pH, UA                      5.5               5.0 - 8.0           Specific Gravity, UA        <=1.005           1.005 - 1.030       Glucose, UA                 Negative          Negative            Ketones,  UA                 Negative          Negative            Bilirubin, UA               Negative          Negative            Blood, UA                   Trace (A)         Negative            Protein, UA                 Negative          Negative            Leuk Esterase, UA           Negative          Negative            Nitrite, UA                 Negative          Negative            Urobilinogen, UA            0.2 E.U./dL       0.2 - 1.0 E.*  -CBC Auto Differential       Result                      Value             Ref Range           WBC                         7.70              3.40 - 10.80*       RBC                         4.68              4.14 - 5.80 *       Hemoglobin                  14.3              13.0 - 17.7 *       Hematocrit                  41.6              37.5 - 51.0 %       MCV                         88.8              79.0 - 97.0 *       MCH                         30.6              26.6 - 33.0 *       MCHC                        34.4              31.5 - 35.7 *       RDW                         13.3              12.3 - 15.4 %       RDW-SD                      42.0              37.0 - 54.0 *       MPV                         6.6               6.0 - 12.0 fL       Platelets                   257               140 - 450 10*       Neutrophil %                64.6              42.7 - 76.0 %       Lymphocyte %                23.2              19.6 - 45.3 %       Monocyte %                  8.8               5.0 - 12.0 %        Eosinophil %                3.0               0.3 - 6.2 %         Basophil %                  0.4               0.0 - 1.5 %         Neutrophils, Absolute       5.00              1.70 - 7.00 *       Lymphocytes, Absolute       1.80              0.70 - 3.10 *       Monocytes, Absolute         0.70              0.10 - 0.90 *       Eosinophils, Absolute       0.20              0.00 - 0.40 *       Basophils, Absolute         0.00              0.00 - 0.20 *       nRBC                         0.0               0.0 - 0.2 /1*  -Urinalysis, Microscopic Only - Urine, Clean Catch       Result                      Value             Ref Range           RBC, UA                     0-2 (A)           None Seen /H*       WBC, UA                     None Seen         None Seen /H*       Bacteria, UA                None Seen         None Seen /H*       Squamous Epithelial Ce*     None Seen         None Seen, 0*       Hyaline Casts, UA           None Seen         None Seen /L*       Methodology                                                   Manual Light Microscopy  -BUN       Result                      Value             Ref Range           BUN                         14                6 - 20 mg/dL   -POC Glucose Once       Result                      Value             Ref Range           Glucose                     98                70 - 105 mg/*  Ct Head Without Contrast    Result Date: 7/19/2020  1. No acute intracranial abnormality. 2. No fracture of the calvarium, skull base or cervical spine. 3. Mild to moderate degenerative changes in the cervical spine greatest at C5-C6, unchanged from prior.  This examination was interpreted by Filiberto Cast M.D. Electronically signed by:  Filiberto Cast M.D.  7/19/2020 2:57 AM    Ct Cervical Spine Without Contrast    Result Date: 7/19/2020  1. No acute intracranial abnormality. 2. No fracture of the calvarium, skull base or cervical spine. 3. Mild to moderate degenerative changes in the cervical spine greatest at C5-C6, unchanged from prior.  This examination was interpreted by Filiberto Cast M.D. Electronically signed by:  Filiberto Cast M.D.  7/19/2020 2:57 AM      On reevaluation, patient alert and oriented x3, again request discharge.  No SI.  Patient does not believe he has a problem with alcohol abuse or alcoholism and declines any referral for treatment.  Patient states he has all of his seizure medicines and will take them as prescribed.   Patient understands he is not to drive or operate any heavy machinery until seen by his family doctor.  However the patient did admit that tonight's episodes were him faking.       Amount and/or Complexity of Data Reviewed  Clinical lab tests: reviewed  Tests in the radiology section of CPT®: reviewed  Decide to obtain previous medical records or to obtain history from someone other than the patient: yes        Final diagnoses:   Alcoholic intoxication without complication (CMS/HCC)   Pseudoseizure   Injury of head, initial encounter   Strain of neck muscle, initial encounter            Broad RunWillie rosenberg MD  07/19/20 0742

## 2020-07-19 NOTE — ED NOTES
Patient spouse called 911 for patient going unresponsive after a seizure. EMS witnessed multiple tonic clonic seizures. Was given versed in route.      Soraida Bassett RN  07/19/20 025

## 2020-08-21 ENCOUNTER — APPOINTMENT (OUTPATIENT)
Dept: CT IMAGING | Facility: HOSPITAL | Age: 50
End: 2020-08-21

## 2020-08-21 ENCOUNTER — HOSPITAL ENCOUNTER (EMERGENCY)
Facility: HOSPITAL | Age: 50
Discharge: HOME | End: 2020-08-22
Attending: EMERGENCY MEDICINE | Admitting: EMERGENCY MEDICINE

## 2020-08-21 VITALS
BODY MASS INDEX: 50.62 KG/M2 | RESPIRATION RATE: 17 BRPM | OXYGEN SATURATION: 96 % | SYSTOLIC BLOOD PRESSURE: 108 MMHG | TEMPERATURE: 98.1 F | DIASTOLIC BLOOD PRESSURE: 54 MMHG | HEART RATE: 105 BPM | HEIGHT: 66 IN | WEIGHT: 315 LBS

## 2020-08-21 DIAGNOSIS — R56.9 SEIZURE (HCC): Primary | ICD-10-CM

## 2020-08-21 LAB
ALBUMIN SERPL-MCNC: 4.7 G/DL (ref 3.5–5.2)
ALBUMIN/GLOB SERPL: 1.7 G/DL
ALP SERPL-CCNC: 114 U/L (ref 39–117)
ALT SERPL W P-5'-P-CCNC: 13 U/L (ref 1–41)
ANION GAP SERPL CALCULATED.3IONS-SCNC: 18 MMOL/L (ref 5–15)
AST SERPL-CCNC: 21 U/L (ref 1–40)
BASOPHILS # BLD AUTO: 0.1 10*3/MM3 (ref 0–0.2)
BASOPHILS NFR BLD AUTO: 0.8 % (ref 0–1.5)
BILIRUB SERPL-MCNC: 0.3 MG/DL (ref 0–1.2)
BUN SERPL-MCNC: 12 MG/DL (ref 6–20)
BUN SERPL-MCNC: ABNORMAL MG/DL
BUN/CREAT SERPL: ABNORMAL
CALCIUM SPEC-SCNC: 8.5 MG/DL (ref 8.6–10.5)
CHLORIDE SERPL-SCNC: 98 MMOL/L (ref 98–107)
CK SERPL-CCNC: 222 U/L (ref 20–200)
CO2 SERPL-SCNC: 18 MMOL/L (ref 22–29)
CREAT SERPL-MCNC: 0.99 MG/DL (ref 0.76–1.27)
DEPRECATED RDW RBC AUTO: 42.9 FL (ref 37–54)
EOSINOPHIL # BLD AUTO: 0.2 10*3/MM3 (ref 0–0.4)
EOSINOPHIL NFR BLD AUTO: 2.2 % (ref 0.3–6.2)
ERYTHROCYTE [DISTWIDTH] IN BLOOD BY AUTOMATED COUNT: 13.6 % (ref 12.3–15.4)
GFR SERPL CREATININE-BSD FRML MDRD: 80 ML/MIN/1.73
GLOBULIN UR ELPH-MCNC: 2.8 GM/DL
GLUCOSE SERPL-MCNC: 103 MG/DL (ref 65–99)
HCT VFR BLD AUTO: 42.4 % (ref 37.5–51)
HGB BLD-MCNC: 14.7 G/DL (ref 13–17.7)
LYMPHOCYTES # BLD AUTO: 1.5 10*3/MM3 (ref 0.7–3.1)
LYMPHOCYTES NFR BLD AUTO: 18 % (ref 19.6–45.3)
MAGNESIUM SERPL-MCNC: 2.2 MG/DL (ref 1.6–2.6)
MCH RBC QN AUTO: 31.2 PG (ref 26.6–33)
MCHC RBC AUTO-ENTMCNC: 34.6 G/DL (ref 31.5–35.7)
MCV RBC AUTO: 90.1 FL (ref 79–97)
MONOCYTES # BLD AUTO: 0.8 10*3/MM3 (ref 0.1–0.9)
MONOCYTES NFR BLD AUTO: 9.8 % (ref 5–12)
NEUTROPHILS NFR BLD AUTO: 5.6 10*3/MM3 (ref 1.7–7)
NEUTROPHILS NFR BLD AUTO: 69.2 % (ref 42.7–76)
NRBC BLD AUTO-RTO: 0.1 /100 WBC (ref 0–0.2)
PLATELET # BLD AUTO: 270 10*3/MM3 (ref 140–450)
PMV BLD AUTO: 7.1 FL (ref 6–12)
POTASSIUM SERPL-SCNC: 3.6 MMOL/L (ref 3.5–5.2)
PROT SERPL-MCNC: 7.5 G/DL (ref 6–8.5)
RBC # BLD AUTO: 4.71 10*6/MM3 (ref 4.14–5.8)
SODIUM SERPL-SCNC: 134 MMOL/L (ref 136–145)
WBC # BLD AUTO: 8.1 10*3/MM3 (ref 3.4–10.8)

## 2020-08-21 PROCEDURE — 85025 COMPLETE CBC W/AUTO DIFF WBC: CPT | Performed by: EMERGENCY MEDICINE

## 2020-08-21 PROCEDURE — 83735 ASSAY OF MAGNESIUM: CPT | Performed by: EMERGENCY MEDICINE

## 2020-08-21 PROCEDURE — 25010000002 LORAZEPAM PER 2 MG: Performed by: EMERGENCY MEDICINE

## 2020-08-21 PROCEDURE — 80053 COMPREHEN METABOLIC PANEL: CPT | Performed by: EMERGENCY MEDICINE

## 2020-08-21 PROCEDURE — 82550 ASSAY OF CK (CPK): CPT | Performed by: EMERGENCY MEDICINE

## 2020-08-21 PROCEDURE — 96374 THER/PROPH/DIAG INJ IV PUSH: CPT

## 2020-08-21 PROCEDURE — 99284 EMERGENCY DEPT VISIT MOD MDM: CPT

## 2020-08-21 RX ORDER — OXCARBAZEPINE 150 MG/1
300 TABLET, FILM COATED ORAL ONCE
Status: COMPLETED | OUTPATIENT
Start: 2020-08-21 | End: 2020-08-21

## 2020-08-21 RX ORDER — LAMOTRIGINE 100 MG/1
100 TABLET ORAL ONCE
Status: COMPLETED | OUTPATIENT
Start: 2020-08-21 | End: 2020-08-21

## 2020-08-21 RX ORDER — LORAZEPAM 2 MG/ML
1 INJECTION INTRAMUSCULAR ONCE
Status: COMPLETED | OUTPATIENT
Start: 2020-08-21 | End: 2020-08-21

## 2020-08-21 RX ORDER — SODIUM CHLORIDE 0.9 % (FLUSH) 0.9 %
10 SYRINGE (ML) INJECTION AS NEEDED
Status: DISCONTINUED | OUTPATIENT
Start: 2020-08-21 | End: 2020-08-22 | Stop reason: HOSPADM

## 2020-08-21 RX ORDER — AMMONIA INHALANTS 0.04 G/.3ML
INHALANT RESPIRATORY (INHALATION)
Status: DISCONTINUED
Start: 2020-08-21 | End: 2020-08-21 | Stop reason: WASHOUT

## 2020-08-21 RX ADMIN — LORAZEPAM 1 MG: 2 INJECTION INTRAMUSCULAR; INTRAVENOUS at 21:24

## 2020-08-21 RX ADMIN — OXCARBAZEPINE 300 MG: 150 TABLET, FILM COATED ORAL at 23:54

## 2020-08-21 RX ADMIN — LAMOTRIGINE 100 MG: 100 TABLET ORAL at 23:54

## 2020-08-22 NOTE — DISCHARGE INSTRUCTIONS
Follow-up primary care doctor and neurologist Monday.  Stop drinking alcohol.  Return for increasing seizure fever vomiting mental status changes or any other new or worsening problems or concerns

## 2020-08-22 NOTE — ED PROVIDER NOTES
Subjective   Chief complaint seizure    History of present illness this is a 50-year-old male with a history of seizures alcohol abuse who reportedly had a seizure at home EMS that may have had 1 in route as well generalized tonic-clonic.  Patient is on Lamictal.  He denies any other injury he complains of pain to his head and back which he states is ongoing chronic.  Moderate severe worse with movement better with rest and ongoing continuous.  No fever chills or recent illness or change medication he states he has been taking his medications.  Nothing makes this better or worse and is ongoing tonight reported to seizures between home and EMS.          Review of Systems   Constitutional: Negative for chills and fever.   Eyes: Negative for photophobia and visual disturbance.   Respiratory: Negative for chest tightness and shortness of breath.    Cardiovascular: Negative for chest pain and leg swelling.   Gastrointestinal: Negative for abdominal pain and vomiting.   Musculoskeletal: Positive for arthralgias and back pain.   Skin: Negative for color change and pallor.   Neurological: Negative for dizziness and light-headedness.   Psychiatric/Behavioral: Negative for agitation and behavioral problems.       Past Medical History:   Diagnosis Date   • Alcohol abuse 7/29/2019   • Depression    • Head contusion 7/29/2019   • History of traumatic head injury 10/26/2018   • Hypertension    • Migraine    • Pneumonia    • Seizures (CMS/HCC)    • Suicidal ideation 8/7/2019       Allergies   Allergen Reactions   • Codeine Unknown (See Comments)     Pt is unsure       Past Surgical History:   Procedure Laterality Date   • HERNIA REPAIR         No family history on file.    Social History     Socioeconomic History   • Marital status: Single     Spouse name: Not on file   • Number of children: Not on file   • Years of education: Not on file   • Highest education level: Not on file   Tobacco Use   • Smoking status: Never Smoker   •  Smokeless tobacco: Never Used   Substance and Sexual Activity   • Alcohol use: Yes     Alcohol/week: 6.0 standard drinks     Types: 6 Cans of beer per week     Comment: on weekends   • Drug use: No   • Sexual activity: Yes     Prior to Admission medications    Medication Sig Start Date End Date Taking? Authorizing Provider   cyclobenzaprine (FLEXERIL) 10 MG tablet Take 10 mg by mouth 2 (Two) Times a Day As Needed for Muscle Spasms.    Paulina Wheeler MD   escitalopram (LEXAPRO) 10 MG tablet Take 10 mg by mouth Daily.    Paulina Wheeler MD   folic acid (FOLVITE) 1 MG tablet Take 1 mg by mouth Daily.    Paulina Wheeler MD   furosemide (LASIX) 40 MG tablet Take 40 mg by mouth 2 (Two) Times a Day.    Paulina Wheeler MD   gabapentin (NEURONTIN) 400 MG capsule Take 400 mg by mouth 3 (Three) Times a Day.    Paulina Wheeler MD   HYDROcodone-acetaminophen (NORCO) 7.5-325 MG per tablet Take 1 tablet by mouth Every 6 (Six) Hours As Needed for Moderate Pain .    Paulina Wheeler MD   lamoTRIgine (LaMICtal) 25 MG tablet Take 100 mg by mouth 2 (Two) Times a Day.    Paulina Wheeler MD   nabumetone (RELAFEN) 500 MG tablet Take 500 mg by mouth 2 (Two) Times a Day As Needed for Mild Pain .    Paulina Wheeler MD   OLANZapine (zyPREXA) 10 MG tablet Take 10 mg by mouth Every Night.    Paulina Wheeler MD   OXcarbazepine (TRILEPTAL) 300 MG tablet Take 3 tablets by mouth 2 (Two) Times a Day.  Patient taking differently: Take 600 mg by mouth 2 (Two) Times a Day. 7/30/19   Jesus Christensen,    sodium chloride 1 g tablet Take 1 g by mouth 3 (Three) Times a Day.    Paulina Wheeler MD           Objective   Physical Exam  50-year-old male awake alert screams and yells at the staff HEENT extraocular muscles intact pupils equal round reactive there is no raccoon or hdz sign neck supple no adenopathy no meningeal signs no JVD lungs clear no retraction heart regular without murmur abdomen  soft no tenderness good bowel sounds extremities pulses are equal throughout upper and lower extremities no edema cords or Homans sign evidence DVT.  Patient's awake alert orientated x3 no facial asymmetry normal speech moves her without difficulty no focal weakness Darshana Coma Scale 15  Procedures           ED Course      Results for orders placed or performed during the hospital encounter of 08/21/20   Comprehensive Metabolic Panel   Result Value Ref Range    Glucose 103 (H) 65 - 99 mg/dL    BUN      Creatinine 0.99 0.76 - 1.27 mg/dL    Sodium 134 (L) 136 - 145 mmol/L    Potassium 3.6 3.5 - 5.2 mmol/L    Chloride 98 98 - 107 mmol/L    CO2 18.0 (L) 22.0 - 29.0 mmol/L    Calcium 8.5 (L) 8.6 - 10.5 mg/dL    Total Protein 7.5 6.0 - 8.5 g/dL    Albumin 4.70 3.50 - 5.20 g/dL    ALT (SGPT) 13 1 - 41 U/L    AST (SGOT) 21 1 - 40 U/L    Alkaline Phosphatase 114 39 - 117 U/L    Total Bilirubin 0.3 0.0 - 1.2 mg/dL    eGFR Non African Amer 80 >60 mL/min/1.73    Globulin 2.8 gm/dL    A/G Ratio 1.7 g/dL    BUN/Creatinine Ratio      Anion Gap 18.0 (H) 5.0 - 15.0 mmol/L   Magnesium   Result Value Ref Range    Magnesium 2.2 1.6 - 2.6 mg/dL   CK   Result Value Ref Range    Creatine Kinase 222 (H) 20 - 200 U/L   CBC Auto Differential   Result Value Ref Range    WBC 8.10 3.40 - 10.80 10*3/mm3    RBC 4.71 4.14 - 5.80 10*6/mm3    Hemoglobin 14.7 13.0 - 17.7 g/dL    Hematocrit 42.4 37.5 - 51.0 %    MCV 90.1 79.0 - 97.0 fL    MCH 31.2 26.6 - 33.0 pg    MCHC 34.6 31.5 - 35.7 g/dL    RDW 13.6 12.3 - 15.4 %    RDW-SD 42.9 37.0 - 54.0 fl    MPV 7.1 6.0 - 12.0 fL    Platelets 270 140 - 450 10*3/mm3    Neutrophil % 69.2 42.7 - 76.0 %    Lymphocyte % 18.0 (L) 19.6 - 45.3 %    Monocyte % 9.8 5.0 - 12.0 %    Eosinophil % 2.2 0.3 - 6.2 %    Basophil % 0.8 0.0 - 1.5 %    Neutrophils, Absolute 5.60 1.70 - 7.00 10*3/mm3    Lymphocytes, Absolute 1.50 0.70 - 3.10 10*3/mm3    Monocytes, Absolute 0.80 0.10 - 0.90 10*3/mm3    Eosinophils, Absolute 0.20  0.00 - 0.40 10*3/mm3    Basophils, Absolute 0.10 0.00 - 0.20 10*3/mm3    nRBC 0.1 0.0 - 0.2 /100 WBC   BUN   Result Value Ref Range    BUN 12 6 - 20 mg/dL     No radiology results for the last day  Medications   sodium chloride 0.9 % flush 10 mL (has no administration in time range)   LORazepam (ATIVAN) injection 1 mg (1 mg Intravenous Given 8/21/20 2124)                                            MDM  Number of Diagnoses or Management Options  Seizure (CMS/[facility]):   Diagnosis management comments: Medical decision making.  Patient IV status placed on monitor he was given Ativan 1 mg IV.  CBC electrolytes unremarkable sodium 134.  Magnesium normal CK was 222.  The patient was recommend to have CT he had done fine here but on his way to CT and an CT he had a brief episode of just flopping around the bed no true generalized tonic-clonic seizure activity was no postictal.  He was awake immediately afterwards.  He was observed for few hours.  At 11:00 PM he was awake alert orientated x3 he refused CT we talked about head injuries and back injuries from seizure he voices understanding but he states he wants to take a cab and go home.  Talked about the potential risk and complications and he voiced understanding and he was discharged home for outpatient management       Amount and/or Complexity of Data Reviewed  Clinical lab tests: reviewed        Final diagnoses:   Seizure (CMS/[facility])            Willie Castillo MD  08/21/20 5451

## 2020-11-06 ENCOUNTER — HOSPITAL ENCOUNTER (EMERGENCY)
Facility: HOSPITAL | Age: 50
Discharge: HOME | End: 2020-11-07
Attending: EMERGENCY MEDICINE | Admitting: EMERGENCY MEDICINE

## 2020-11-06 DIAGNOSIS — F10.929 ALCOHOLIC INTOXICATION WITH COMPLICATION (HCC): Primary | ICD-10-CM

## 2020-11-06 DIAGNOSIS — R56.9 SEIZURE (HCC): ICD-10-CM

## 2020-11-06 LAB
ANION GAP SERPL CALCULATED.3IONS-SCNC: 16 MMOL/L (ref 5–15)
BASOPHILS # BLD AUTO: 0.1 10*3/MM3 (ref 0–0.2)
BASOPHILS NFR BLD AUTO: 1 % (ref 0–1.5)
BUN SERPL-MCNC: 7 MG/DL (ref 6–20)
BUN/CREAT SERPL: 8.3 (ref 7–25)
CALCIUM SPEC-SCNC: 8.5 MG/DL (ref 8.6–10.5)
CHLORIDE SERPL-SCNC: 98 MMOL/L (ref 98–107)
CK SERPL-CCNC: 110 U/L (ref 20–200)
CO2 SERPL-SCNC: 24 MMOL/L (ref 22–29)
CREAT SERPL-MCNC: 0.84 MG/DL (ref 0.76–1.27)
DEPRECATED RDW RBC AUTO: 42.4 FL (ref 37–54)
EOSINOPHIL # BLD AUTO: 0.1 10*3/MM3 (ref 0–0.4)
EOSINOPHIL NFR BLD AUTO: 2.4 % (ref 0.3–6.2)
ERYTHROCYTE [DISTWIDTH] IN BLOOD BY AUTOMATED COUNT: 13.5 % (ref 12.3–15.4)
ETHANOL UR QL: 0.36 %
GFR SERPL CREATININE-BSD FRML MDRD: 97 ML/MIN/1.73
GLUCOSE SERPL-MCNC: 95 MG/DL (ref 65–99)
HCT VFR BLD AUTO: 43.2 % (ref 37.5–51)
HGB BLD-MCNC: 14.7 G/DL (ref 13–17.7)
HOLD SPECIMEN: NORMAL
LYMPHOCYTES # BLD AUTO: 0.9 10*3/MM3 (ref 0.7–3.1)
LYMPHOCYTES NFR BLD AUTO: 14.7 % (ref 19.6–45.3)
MCH RBC QN AUTO: 30.5 PG (ref 26.6–33)
MCHC RBC AUTO-ENTMCNC: 33.9 G/DL (ref 31.5–35.7)
MCV RBC AUTO: 89.8 FL (ref 79–97)
MONOCYTES # BLD AUTO: 0.3 10*3/MM3 (ref 0.1–0.9)
MONOCYTES NFR BLD AUTO: 4.7 % (ref 5–12)
NEUTROPHILS NFR BLD AUTO: 4.6 10*3/MM3 (ref 1.7–7)
NEUTROPHILS NFR BLD AUTO: 77.2 % (ref 42.7–76)
NRBC BLD AUTO-RTO: 0 /100 WBC (ref 0–0.2)
PLATELET # BLD AUTO: 319 10*3/MM3 (ref 140–450)
PMV BLD AUTO: 6.6 FL (ref 6–12)
POTASSIUM SERPL-SCNC: 3.9 MMOL/L (ref 3.5–5.2)
RBC # BLD AUTO: 4.81 10*6/MM3 (ref 4.14–5.8)
SODIUM SERPL-SCNC: 138 MMOL/L (ref 136–145)
WBC # BLD AUTO: 6 10*3/MM3 (ref 3.4–10.8)
WHOLE BLOOD HOLD SPECIMEN: NORMAL

## 2020-11-06 PROCEDURE — 82550 ASSAY OF CK (CPK): CPT | Performed by: EMERGENCY MEDICINE

## 2020-11-06 PROCEDURE — 99284 EMERGENCY DEPT VISIT MOD MDM: CPT

## 2020-11-06 PROCEDURE — 85025 COMPLETE CBC W/AUTO DIFF WBC: CPT | Performed by: EMERGENCY MEDICINE

## 2020-11-06 PROCEDURE — 80307 DRUG TEST PRSMV CHEM ANLYZR: CPT | Performed by: EMERGENCY MEDICINE

## 2020-11-06 PROCEDURE — 80048 BASIC METABOLIC PNL TOTAL CA: CPT | Performed by: EMERGENCY MEDICINE

## 2020-11-06 RX ORDER — SODIUM CHLORIDE 0.9 % (FLUSH) 0.9 %
10 SYRINGE (ML) INJECTION AS NEEDED
Status: DISCONTINUED | OUTPATIENT
Start: 2020-11-06 | End: 2020-11-07 | Stop reason: HOSPADM

## 2020-11-06 NOTE — ED PROVIDER NOTES
Subjective   History of Present Illness  50-year-old male presents with complaint of seizure.  He has history of alcoholism and seizure disorder.  He has had two recent admissions at [facility] for same.  He had been seen there on the second of this month he required chemical sedation due to combative behavior.  Patient complains of pain in his right shoulder.  This has been evaluated previously with negative x-rays.  He is uncooperative with additional history.  Review of Systems  Uncooperative with complete review of systems  Past Medical History:   Diagnosis Date   • Alcohol abuse 7/29/2019   • Depression    • Head contusion 7/29/2019   • History of traumatic head injury 10/26/2018   • Hypertension    • Migraine    • Pneumonia    • Seizures (CMS/HCC)    • Suicidal ideation 8/7/2019       Allergies   Allergen Reactions   • Codeine Unknown (See Comments)     Pt is unsure       Past Surgical History:   Procedure Laterality Date   • HERNIA REPAIR         No family history on file.    Social History     Socioeconomic History   • Marital status: Single     Spouse name: Not on file   • Number of children: Not on file   • Years of education: Not on file   • Highest education level: Not on file   Tobacco Use   • Smoking status: Never Smoker   • Smokeless tobacco: Never Used   Substance and Sexual Activity   • Alcohol use: Yes     Alcohol/week: 6.0 standard drinks     Types: 6 Cans of beer per week     Comment: on weekends   • Drug use: No   • Sexual activity: Yes     Prior to Admission medications    Medication Sig Start Date End Date Taking? Authorizing Provider   cyclobenzaprine (FLEXERIL) 10 MG tablet Take 10 mg by mouth 2 (Two) Times a Day As Needed for Muscle Spasms.    ProviderPaulina MD   escitalopram (LEXAPRO) 10 MG tablet Take 10 mg by mouth Daily.    Provider, MD Paulina   folic acid (FOLVITE) 1 MG tablet Take 1 mg by mouth Daily.    Provider, Historical, MD   furosemide (LASIX) 40 MG tablet Take  40 mg by mouth 2 (Two) Times a Day.    Paulina Wheeler MD   gabapentin (NEURONTIN) 400 MG capsule Take 400 mg by mouth 3 (Three) Times a Day.    Paulina Wheeler MD   HYDROcodone-acetaminophen (NORCO) 7.5-325 MG per tablet Take 1 tablet by mouth Every 6 (Six) Hours As Needed for Moderate Pain .    Paulina Wheeler MD   lamoTRIgine (LaMICtal) 25 MG tablet Take 100 mg by mouth 2 (Two) Times a Day.    Paulina Wheeler MD   nabumetone (RELAFEN) 500 MG tablet Take 500 mg by mouth 2 (Two) Times a Day As Needed for Mild Pain .    Paulina Wheeler MD   OLANZapine (zyPREXA) 10 MG tablet Take 10 mg by mouth Every Night.    Paulina Wheeler MD   OXcarbazepine (TRILEPTAL) 300 MG tablet Take 3 tablets by mouth 2 (Two) Times a Day.  Patient taking differently: Take 600 mg by mouth 2 (Two) Times a Day. 7/30/19   Jesus Christensen,    sodium chloride 1 g tablet Take 1 g by mouth 3 (Three) Times a Day.    Paulina Wheeler MD         Objective   Physical Exam  50-year-old male awake alert.  He is mildly belligerent and dysarthric consistent with intoxication.  There is old abrasion to left forehead from where it is noted that he had fallen in the last month.  Pupils equal round [place].  He complained of some pain with palpation of right anterior shoulder.  Chest clear cardiovascular a rhythm abdomen soft nontender neurologic exam without focal findings noted.  Procedures           ED Course  ED Course as of Nov 06 2243 Fri Nov 06, 2020 2012 Patient currently sleeping in no distress.    [SP]   5359 Patient still sleeping.  Wife reported that he was supposed to have a bed at detox unit at [place].  They were contacted and reported that they did not have a bed for him.  Just to discharge him and have him go to the hospital there and they would evaluate him.  The ER was contacted and advised us that they were not accepting any patients in transfer.  This information was relayed to patient's  wife.    [SP]   5884 Patient sleeping.  He was awakened no focal findings noted..  He was allowed to return to sleep.    [SP]      ED Course User Index  [SP] Bonilla Epps MD      Patient was noted to have some rhythmic seizure-like activity.  To stop before I could see it.  Nursing staff felt it was not consistent with seizure                                     MDM  Chart review: Patient has had admissions to [facility] on the 21st and 30th of last month for alcohol intoxication and seizures.  He had also been seen on the second of this month.  Comorbidity: As per past history  Differential: Alcohol intoxication, seizure versus psychogenic nonepileptic seizure  My EKG interpretation:   Lab: Basic metabolic panel remarkable for calcium of 8.5 CK total 110 CBC no significant abnormality ethanol 0.363  Radiology:   Discussion/treatment: Patient was observed.  Findings were discussed with his wife.  When he is sober enough he should be able to be discharged.  To be turned over to Dr. Morales Alvarez for final disposition  Patient was evaluated using appropriate PPE      Final diagnoses:   Alcoholic intoxication with complication (CMS/HCC)   Seizure (CMS/HCC)            Bonilla Epps MD  11/06/20 0457

## 2020-11-06 NOTE — ED NOTES
Pt upon arrival has urinated on self multiple times.   Pt has rhythmic activity w/arms and legs.  Pt wanted to go to [facility] per his and family request.  EMS brought him here.   Pt had 2 rhythmic seizures and went to get md and md entered the room.  No new orders.     Marycruz Mooney, PREMAN  11/06/20 4470

## 2020-11-07 VITALS
OXYGEN SATURATION: 93 % | HEART RATE: 92 BPM | TEMPERATURE: 98.1 F | HEIGHT: 65 IN | DIASTOLIC BLOOD PRESSURE: 74 MMHG | WEIGHT: 218 LBS | BODY MASS INDEX: 36.32 KG/M2 | RESPIRATION RATE: 18 BRPM | SYSTOLIC BLOOD PRESSURE: 132 MMHG

## 2020-11-07 NOTE — ED NOTES
Gayatri Gutiérrez, spouse repeatedly called this nurse attempting to insert herself into the process of forcing an ER to ER transfer to [facility]. It was explained multiple times that an ER to ER transfer was not possible due to no accepting doc at [facility]. She then requested for an ambulance to transfer patient to [facility] for evaluation and it was explained to her that the patient had been discharged at this time and there was no medical necessity for EMS to transport patient to another ER. Patient's spouse became increasingly upset. EMTALA was explained to her multiple times and she continued question this nurse. She then stated she would call back when she decided what she was going to do about picking up the patient. Charge nurse made aware. MD made aware. Patient is stable at this time, sitting in chair. IV removed per protocol. He verbalizes that he is waiting on June.      Narcisa Salmon, FLORES  11/07/20 0023

## 2020-11-07 NOTE — DISCHARGE INSTRUCTIONS
Follow-up with your primary doctor.  Return to the emergency room for any new or worsening symptoms or if you have any other questions or concerns.  Avoid excessive alcohol use.  Call the [facility] department at [phone] to seek help for alcohol abuse.